# Patient Record
Sex: FEMALE | Race: WHITE | HISPANIC OR LATINO | URBAN - METROPOLITAN AREA
[De-identification: names, ages, dates, MRNs, and addresses within clinical notes are randomized per-mention and may not be internally consistent; named-entity substitution may affect disease eponyms.]

---

## 2017-07-03 ENCOUNTER — EMERGENCY (EMERGENCY)
Facility: HOSPITAL | Age: 67
LOS: 0 days | Discharge: HOME | End: 2017-07-03
Attending: EMERGENCY MEDICINE | Admitting: EMERGENCY MEDICINE
Payer: MEDICARE

## 2017-07-03 VITALS
RESPIRATION RATE: 17 BRPM | DIASTOLIC BLOOD PRESSURE: 84 MMHG | OXYGEN SATURATION: 96 % | HEART RATE: 96 BPM | TEMPERATURE: 99 F | SYSTOLIC BLOOD PRESSURE: 181 MMHG

## 2017-07-03 VITALS — WEIGHT: 166.89 LBS | HEIGHT: 64 IN

## 2017-07-03 DIAGNOSIS — C50.919 MALIGNANT NEOPLASM OF UNSPECIFIED SITE OF UNSPECIFIED FEMALE BREAST: ICD-10-CM

## 2017-07-03 DIAGNOSIS — D64.81 ANEMIA DUE TO ANTINEOPLASTIC CHEMOTHERAPY: ICD-10-CM

## 2017-07-03 DIAGNOSIS — T45.1X5A ADVERSE EFFECT OF ANTINEOPLASTIC AND IMMUNOSUPPRESSIVE DRUGS, INITIAL ENCOUNTER: ICD-10-CM

## 2017-07-03 LAB
ABO RH CONFIRMATION: SIGNIFICANT CHANGE UP
ALBUMIN SERPL ELPH-MCNC: 3 G/DL — LOW (ref 3.3–5)
ALP SERPL-CCNC: 92 U/L — SIGNIFICANT CHANGE UP (ref 40–120)
ALT FLD-CCNC: 34 U/L — SIGNIFICANT CHANGE UP (ref 12–78)
ANION GAP SERPL CALC-SCNC: 9 MMOL/L — SIGNIFICANT CHANGE UP (ref 5–17)
ANISOCYTOSIS BLD QL: SIGNIFICANT CHANGE UP
APTT BLD: 35.4 SEC — SIGNIFICANT CHANGE UP (ref 27.5–37.4)
AST SERPL-CCNC: 45 U/L — HIGH (ref 15–37)
BASOPHILS # BLD AUTO: 0 K/UL — SIGNIFICANT CHANGE UP (ref 0–0.2)
BILIRUB SERPL-MCNC: 0.8 MG/DL — SIGNIFICANT CHANGE UP (ref 0.2–1.2)
BLD GP AB SCN SERPL QL: SIGNIFICANT CHANGE UP
BUN SERPL-MCNC: 20 MG/DL — SIGNIFICANT CHANGE UP (ref 7–23)
CALCIUM SERPL-MCNC: 8.2 MG/DL — LOW (ref 8.5–10.1)
CHLORIDE SERPL-SCNC: 106 MMOL/L — SIGNIFICANT CHANGE UP (ref 96–108)
CO2 SERPL-SCNC: 23 MMOL/L — SIGNIFICANT CHANGE UP (ref 22–31)
CREAT SERPL-MCNC: 1.46 MG/DL — HIGH (ref 0.5–1.3)
DACRYOCYTES BLD QL SMEAR: SLIGHT — SIGNIFICANT CHANGE UP
EOSINOPHIL # BLD AUTO: 0.1 K/UL — SIGNIFICANT CHANGE UP (ref 0–0.5)
EOSINOPHIL NFR BLD AUTO: 2 % — SIGNIFICANT CHANGE UP (ref 0–6)
GLUCOSE SERPL-MCNC: 102 MG/DL — HIGH (ref 70–99)
HCT VFR BLD CALC: 22.1 % — LOW (ref 34.5–45)
HGB BLD-MCNC: 7.5 G/DL — LOW (ref 11.5–15.5)
HYPOCHROMIA BLD QL: SLIGHT — SIGNIFICANT CHANGE UP
INR BLD: 1.23 RATIO — HIGH (ref 0.88–1.16)
LYMPHOCYTES # BLD AUTO: 1 K/UL — SIGNIFICANT CHANGE UP (ref 1–3.3)
LYMPHOCYTES # BLD AUTO: 20 % — SIGNIFICANT CHANGE UP (ref 13–44)
MAGNESIUM SERPL-MCNC: 2 MG/DL — SIGNIFICANT CHANGE UP (ref 1.6–2.6)
MANUAL SMEAR VERIFICATION: YES — SIGNIFICANT CHANGE UP
MCHC RBC-ENTMCNC: 30 PG — SIGNIFICANT CHANGE UP (ref 27–34)
MCHC RBC-ENTMCNC: 33.8 GM/DL — SIGNIFICANT CHANGE UP (ref 32–36)
MCV RBC AUTO: 88.9 FL — SIGNIFICANT CHANGE UP (ref 80–100)
METAMYELOCYTES # FLD: 2 % — HIGH (ref 0–0)
MONOCYTES # BLD AUTO: 0.9 K/UL — SIGNIFICANT CHANGE UP (ref 0–0.9)
MONOCYTES NFR BLD AUTO: 19 % — HIGH (ref 2–14)
NEUTROPHILS # BLD AUTO: 3.2 K/UL — SIGNIFICANT CHANGE UP (ref 1.8–7.4)
NEUTROPHILS NFR BLD AUTO: 52 % — SIGNIFICANT CHANGE UP (ref 43–77)
NEUTS BAND # BLD: 3 % — SIGNIFICANT CHANGE UP (ref 0–8)
NT-PROBNP SERPL-SCNC: 7655 PG/ML — HIGH (ref 0–125)
OVALOCYTES BLD QL SMEAR: SLIGHT — SIGNIFICANT CHANGE UP
PLAT MORPH BLD: NORMAL — SIGNIFICANT CHANGE UP
PLATELET # BLD AUTO: 125 K/UL — LOW (ref 150–400)
POIKILOCYTOSIS BLD QL AUTO: SIGNIFICANT CHANGE UP
POLYCHROMASIA BLD QL SMEAR: SIGNIFICANT CHANGE UP
POTASSIUM SERPL-MCNC: 4.3 MMOL/L — SIGNIFICANT CHANGE UP (ref 3.5–5.3)
POTASSIUM SERPL-SCNC: 4.3 MMOL/L — SIGNIFICANT CHANGE UP (ref 3.5–5.3)
PROT SERPL-MCNC: 6 GM/DL — SIGNIFICANT CHANGE UP (ref 6–8.3)
PROTHROM AB SERPL-ACNC: 13.3 SEC — HIGH (ref 9.8–12.7)
RBC # BLD: 2.49 M/UL — LOW (ref 3.8–5.2)
RBC # FLD: 23.1 % — HIGH (ref 10.3–14.5)
RBC BLD AUTO: (no result)
SODIUM SERPL-SCNC: 138 MMOL/L — SIGNIFICANT CHANGE UP (ref 135–145)
TROPONIN I SERPL-MCNC: <0.015 NG/ML — SIGNIFICANT CHANGE UP (ref 0.01–0.04)
TSH SERPL-MCNC: <0.005 UIU/ML — LOW (ref 0.36–3.74)
TYPE + AB SCN PNL BLD: SIGNIFICANT CHANGE UP
VARIANT LYMPHS # BLD: 2 % — SIGNIFICANT CHANGE UP (ref 0–6)
WBC # BLD: 5.2 K/UL — SIGNIFICANT CHANGE UP (ref 3.8–10.5)
WBC # FLD AUTO: 5.2 K/UL — SIGNIFICANT CHANGE UP (ref 3.8–10.5)

## 2017-07-03 PROCEDURE — 93010 ELECTROCARDIOGRAM REPORT: CPT

## 2017-07-03 PROCEDURE — 71020: CPT | Mod: 26

## 2017-07-03 PROCEDURE — 99285 EMERGENCY DEPT VISIT HI MDM: CPT

## 2017-07-03 RX ORDER — SODIUM CHLORIDE 9 MG/ML
3 INJECTION INTRAMUSCULAR; INTRAVENOUS; SUBCUTANEOUS ONCE
Qty: 0 | Refills: 0 | Status: COMPLETED | OUTPATIENT
Start: 2017-07-03 | End: 2017-07-03

## 2017-07-03 RX ORDER — SODIUM CHLORIDE 9 MG/ML
1000 INJECTION INTRAMUSCULAR; INTRAVENOUS; SUBCUTANEOUS ONCE
Qty: 0 | Refills: 0 | Status: DISCONTINUED | OUTPATIENT
Start: 2017-07-03 | End: 2017-07-03

## 2017-07-03 RX ADMIN — SODIUM CHLORIDE 3 MILLILITER(S): 9 INJECTION INTRAMUSCULAR; INTRAVENOUS; SUBCUTANEOUS at 15:12

## 2017-07-03 NOTE — ED PROVIDER NOTE - OBJECTIVE STATEMENT
67 yo female currently undergoing chemo for breast cancer at [facility], sent in by her oncologist Dr. Parker for low blood count on labs drawn last Friday: Hgb=7.4, Hct=21.7, WBC=1.9. Pt reports feeling weak and dizzy. Has had two transfusions in the past.

## 2017-07-03 NOTE — ED STATDOCS - PROGRESS NOTE DETAILS
Shay Mauricio on behalf of Attending Dr. Hollins. 67 y/o female with PMHx of breast CA presents to the ED c/o weakness. Pt went to chemo on Friday and found that her H&H were low. Pt has been feeling weak, nauseous and 1 episode of vomiting and 1 episode of constipation 2 days ago. pt states that stool had been dark. Pt was sent to the ED to receive transfusions. Pt will be sent to the Main ED for further evaluation.

## 2017-07-03 NOTE — ED PROVIDER NOTE - PROGRESS NOTE DETAILS
patient feels better after transfusion; currently no sob; labs including BNP noted but patient feels ok  will follow up with oncologist

## 2017-07-03 NOTE — ED PROVIDER NOTE - MUSCULOSKELETAL, MLM
Spine appears normal, range of motion is not limited, no muscle or joint tenderness. Port in left upper chest wall. No calf swelling or tenderness.

## 2017-08-05 ENCOUNTER — INPATIENT (INPATIENT)
Facility: HOSPITAL | Age: 67
LOS: 4 days | Discharge: HOME | End: 2017-08-10
Attending: FAMILY MEDICINE | Admitting: INTERNAL MEDICINE
Payer: MEDICARE

## 2017-08-05 VITALS — WEIGHT: 164.91 LBS | HEIGHT: 64 IN

## 2017-08-05 LAB
ADD ON TEST-SPECIMEN IN LAB: SIGNIFICANT CHANGE UP
ALBUMIN SERPL ELPH-MCNC: 3 G/DL — LOW (ref 3.3–5)
ALP SERPL-CCNC: 97 U/L — SIGNIFICANT CHANGE UP (ref 40–120)
ALT FLD-CCNC: 25 U/L — SIGNIFICANT CHANGE UP (ref 12–78)
ANION GAP SERPL CALC-SCNC: 8 MMOL/L — SIGNIFICANT CHANGE UP (ref 5–17)
APPEARANCE UR: CLEAR — SIGNIFICANT CHANGE UP
AST SERPL-CCNC: 29 U/L — SIGNIFICANT CHANGE UP (ref 15–37)
BACTERIA # UR AUTO: (no result)
BASOPHILS # BLD AUTO: 0.1 K/UL — SIGNIFICANT CHANGE UP (ref 0–0.2)
BASOPHILS NFR BLD AUTO: 1.2 % — SIGNIFICANT CHANGE UP (ref 0–2)
BILIRUB SERPL-MCNC: 0.8 MG/DL — SIGNIFICANT CHANGE UP (ref 0.2–1.2)
BILIRUB UR-MCNC: NEGATIVE — SIGNIFICANT CHANGE UP
BUN SERPL-MCNC: 32 MG/DL — HIGH (ref 7–23)
CALCIUM SERPL-MCNC: 8.8 MG/DL — SIGNIFICANT CHANGE UP (ref 8.5–10.1)
CHLORIDE SERPL-SCNC: 106 MMOL/L — SIGNIFICANT CHANGE UP (ref 96–108)
CO2 SERPL-SCNC: 23 MMOL/L — SIGNIFICANT CHANGE UP (ref 22–31)
COLOR SPEC: YELLOW — SIGNIFICANT CHANGE UP
CREAT SERPL-MCNC: 1.97 MG/DL — HIGH (ref 0.5–1.3)
D DIMER BLD IA.RAPID-MCNC: 581 NG/ML DDU — HIGH
DIFF PNL FLD: (no result)
EOSINOPHIL # BLD AUTO: 0.3 K/UL — SIGNIFICANT CHANGE UP (ref 0–0.5)
EOSINOPHIL NFR BLD AUTO: 3.6 % — SIGNIFICANT CHANGE UP (ref 0–6)
EPI CELLS # UR: SIGNIFICANT CHANGE UP
GLUCOSE SERPL-MCNC: 162 MG/DL — HIGH (ref 70–99)
GLUCOSE UR QL: NEGATIVE MG/DL — SIGNIFICANT CHANGE UP
HCT VFR BLD CALC: 25.2 % — LOW (ref 34.5–45)
HGB BLD-MCNC: 8.6 G/DL — LOW (ref 11.5–15.5)
INR BLD: 1.19 RATIO — HIGH (ref 0.88–1.16)
KETONES UR-MCNC: NEGATIVE — SIGNIFICANT CHANGE UP
LACTATE SERPL-SCNC: 1.5 MMOL/L — SIGNIFICANT CHANGE UP (ref 0.7–2)
LEUKOCYTE ESTERASE UR-ACNC: NEGATIVE — SIGNIFICANT CHANGE UP
LYMPHOCYTES # BLD AUTO: 1.2 K/UL — SIGNIFICANT CHANGE UP (ref 1–3.3)
LYMPHOCYTES # BLD AUTO: 16.8 % — SIGNIFICANT CHANGE UP (ref 13–44)
MCHC RBC-ENTMCNC: 30.5 PG — SIGNIFICANT CHANGE UP (ref 27–34)
MCHC RBC-ENTMCNC: 34.1 GM/DL — SIGNIFICANT CHANGE UP (ref 32–36)
MCV RBC AUTO: 89.2 FL — SIGNIFICANT CHANGE UP (ref 80–100)
MONOCYTES # BLD AUTO: 0.5 K/UL — SIGNIFICANT CHANGE UP (ref 0–0.9)
MONOCYTES NFR BLD AUTO: 7.2 % — SIGNIFICANT CHANGE UP (ref 2–14)
NEUTROPHILS # BLD AUTO: 5.2 K/UL — SIGNIFICANT CHANGE UP (ref 1.8–7.4)
NEUTROPHILS NFR BLD AUTO: 71.2 % — SIGNIFICANT CHANGE UP (ref 43–77)
NITRITE UR-MCNC: NEGATIVE — SIGNIFICANT CHANGE UP
NT-PROBNP SERPL-SCNC: HIGH PG/ML (ref 0–125)
PH UR: 7 — SIGNIFICANT CHANGE UP (ref 5–8)
PLATELET # BLD AUTO: 194 K/UL — SIGNIFICANT CHANGE UP (ref 150–400)
POTASSIUM SERPL-MCNC: 4.5 MMOL/L — SIGNIFICANT CHANGE UP (ref 3.5–5.3)
POTASSIUM SERPL-SCNC: 4.5 MMOL/L — SIGNIFICANT CHANGE UP (ref 3.5–5.3)
PROT SERPL-MCNC: 6.1 GM/DL — SIGNIFICANT CHANGE UP (ref 6–8.3)
PROT UR-MCNC: 30 MG/DL
PROTHROM AB SERPL-ACNC: 12.9 SEC — HIGH (ref 9.8–12.7)
RBC # BLD: 2.82 M/UL — LOW (ref 3.8–5.2)
RBC # FLD: 14.3 % — SIGNIFICANT CHANGE UP (ref 10.3–14.5)
RBC CASTS # UR COMP ASSIST: (no result) /HPF (ref 0–4)
SODIUM SERPL-SCNC: 137 MMOL/L — SIGNIFICANT CHANGE UP (ref 135–145)
SP GR SPEC: 1 — LOW (ref 1.01–1.02)
TROPONIN I SERPL-MCNC: <0.015 NG/ML — SIGNIFICANT CHANGE UP (ref 0.01–0.04)
UROBILINOGEN FLD QL: NEGATIVE MG/DL — SIGNIFICANT CHANGE UP
WBC # BLD: 7.3 K/UL — SIGNIFICANT CHANGE UP (ref 3.8–10.5)
WBC # FLD AUTO: 7.3 K/UL — SIGNIFICANT CHANGE UP (ref 3.8–10.5)
WBC UR QL: SIGNIFICANT CHANGE UP

## 2017-08-05 PROCEDURE — 71020: CPT | Mod: 26

## 2017-08-05 PROCEDURE — 99285 EMERGENCY DEPT VISIT HI MDM: CPT

## 2017-08-05 PROCEDURE — 93970 EXTREMITY STUDY: CPT | Mod: 26

## 2017-08-05 PROCEDURE — 93010 ELECTROCARDIOGRAM REPORT: CPT

## 2017-08-05 RX ORDER — GLUCAGON INJECTION, SOLUTION 0.5 MG/.1ML
1 INJECTION, SOLUTION SUBCUTANEOUS ONCE
Qty: 0 | Refills: 0 | Status: DISCONTINUED | OUTPATIENT
Start: 2017-08-05 | End: 2017-08-10

## 2017-08-05 RX ORDER — INSULIN LISPRO 100/ML
VIAL (ML) SUBCUTANEOUS
Qty: 0 | Refills: 0 | Status: DISCONTINUED | OUTPATIENT
Start: 2017-08-05 | End: 2017-08-10

## 2017-08-05 RX ORDER — FUROSEMIDE 40 MG
0 TABLET ORAL
Qty: 0 | Refills: 0 | COMMUNITY

## 2017-08-05 RX ORDER — SODIUM CHLORIDE 9 MG/ML
1000 INJECTION, SOLUTION INTRAVENOUS
Qty: 0 | Refills: 0 | Status: DISCONTINUED | OUTPATIENT
Start: 2017-08-05 | End: 2017-08-10

## 2017-08-05 RX ORDER — SODIUM CHLORIDE 9 MG/ML
3 INJECTION INTRAMUSCULAR; INTRAVENOUS; SUBCUTANEOUS EVERY 8 HOURS
Qty: 0 | Refills: 0 | Status: DISCONTINUED | OUTPATIENT
Start: 2017-08-05 | End: 2017-08-10

## 2017-08-05 RX ORDER — ENOXAPARIN SODIUM 100 MG/ML
75 INJECTION SUBCUTANEOUS EVERY 12 HOURS
Qty: 0 | Refills: 0 | Status: DISCONTINUED | OUTPATIENT
Start: 2017-08-05 | End: 2017-08-09

## 2017-08-05 RX ORDER — DEXTROSE 50 % IN WATER 50 %
1 SYRINGE (ML) INTRAVENOUS ONCE
Qty: 0 | Refills: 0 | Status: DISCONTINUED | OUTPATIENT
Start: 2017-08-05 | End: 2017-08-10

## 2017-08-05 RX ORDER — SIMVASTATIN 20 MG/1
10 TABLET, FILM COATED ORAL AT BEDTIME
Qty: 0 | Refills: 0 | Status: DISCONTINUED | OUTPATIENT
Start: 2017-08-05 | End: 2017-08-10

## 2017-08-05 RX ORDER — FUROSEMIDE 40 MG
40 TABLET ORAL DAILY
Qty: 0 | Refills: 0 | Status: DISCONTINUED | OUTPATIENT
Start: 2017-08-05 | End: 2017-08-10

## 2017-08-05 RX ORDER — PANTOPRAZOLE SODIUM 20 MG/1
40 TABLET, DELAYED RELEASE ORAL
Qty: 0 | Refills: 0 | Status: DISCONTINUED | OUTPATIENT
Start: 2017-08-05 | End: 2017-08-10

## 2017-08-05 RX ORDER — AMLODIPINE BESYLATE 2.5 MG/1
5 TABLET ORAL AT BEDTIME
Qty: 0 | Refills: 0 | Status: DISCONTINUED | OUTPATIENT
Start: 2017-08-05 | End: 2017-08-10

## 2017-08-05 RX ORDER — DEXTROSE 50 % IN WATER 50 %
12.5 SYRINGE (ML) INTRAVENOUS ONCE
Qty: 0 | Refills: 0 | Status: DISCONTINUED | OUTPATIENT
Start: 2017-08-05 | End: 2017-08-10

## 2017-08-05 RX ORDER — DEXTROSE 50 % IN WATER 50 %
25 SYRINGE (ML) INTRAVENOUS ONCE
Qty: 0 | Refills: 0 | Status: DISCONTINUED | OUTPATIENT
Start: 2017-08-05 | End: 2017-08-10

## 2017-08-05 RX ORDER — LISINOPRIL 2.5 MG/1
1 TABLET ORAL
Qty: 0 | Refills: 0 | COMMUNITY

## 2017-08-05 RX ORDER — ENOXAPARIN SODIUM 100 MG/ML
75 INJECTION SUBCUTANEOUS ONCE
Qty: 0 | Refills: 0 | Status: COMPLETED | OUTPATIENT
Start: 2017-08-05 | End: 2017-08-05

## 2017-08-05 RX ORDER — SODIUM CHLORIDE 9 MG/ML
3 INJECTION INTRAMUSCULAR; INTRAVENOUS; SUBCUTANEOUS ONCE
Qty: 0 | Refills: 0 | Status: COMPLETED | OUTPATIENT
Start: 2017-08-05 | End: 2017-08-05

## 2017-08-05 RX ORDER — SOD,AMMONIUM,POTASSIUM LACTATE
1 CREAM (GRAM) TOPICAL
Qty: 0 | Refills: 0 | Status: DISCONTINUED | OUTPATIENT
Start: 2017-08-05 | End: 2017-08-10

## 2017-08-05 RX ORDER — HYDRALAZINE HCL 50 MG
10 TABLET ORAL EVERY 8 HOURS
Qty: 0 | Refills: 0 | Status: DISCONTINUED | OUTPATIENT
Start: 2017-08-05 | End: 2017-08-10

## 2017-08-05 RX ORDER — FAMOTIDINE 10 MG/ML
20 INJECTION INTRAVENOUS ONCE
Qty: 0 | Refills: 0 | Status: COMPLETED | OUTPATIENT
Start: 2017-08-05 | End: 2017-08-05

## 2017-08-05 RX ADMIN — Medication 30 MILLILITER(S): at 15:11

## 2017-08-05 RX ADMIN — ENOXAPARIN SODIUM 75 MILLIGRAM(S): 100 INJECTION SUBCUTANEOUS at 16:31

## 2017-08-05 RX ADMIN — SIMVASTATIN 10 MILLIGRAM(S): 20 TABLET, FILM COATED ORAL at 22:27

## 2017-08-05 RX ADMIN — AMLODIPINE BESYLATE 5 MILLIGRAM(S): 2.5 TABLET ORAL at 22:24

## 2017-08-05 RX ADMIN — FAMOTIDINE 20 MILLIGRAM(S): 10 INJECTION INTRAVENOUS at 15:11

## 2017-08-05 RX ADMIN — SODIUM CHLORIDE 3 MILLILITER(S): 9 INJECTION INTRAMUSCULAR; INTRAVENOUS; SUBCUTANEOUS at 22:27

## 2017-08-05 RX ADMIN — SODIUM CHLORIDE 3 MILLILITER(S): 9 INJECTION INTRAMUSCULAR; INTRAVENOUS; SUBCUTANEOUS at 12:32

## 2017-08-05 NOTE — H&P ADULT - ASSESSMENT
1) SOB over last 24 hrs  A) orthopnea and ARANA  B) prior hx CHF(unspecified if sys+/or diastolic but off lisinopril and lasix for several days w elevated BNP 10,000.   poss CM due to remote adriamycin and HTN  C) elevated ddimer in pt w malignacy concerning for poss PE  1. admit telemetry  2. monitor pulse ox  3. Oxygen to keep sat>95%  4. card to see  5. echo  6. checking TSH off thyroid meds  4. IV lasix tonight  5. repeat BMP, Mg  6 continue statin and norvasc  7. no ACE- I given renal dysfunction  8. holding off on VQ scan at present    2) CKD stage 3-4  A) recently increased to 2.5 on 07-31 but numbers improving although CHF worsened  1. weights  2. I/O  3. manage BP  4. monitor Cr as lovenox dosing may require adjustment if number increas    3) HTN  A) daughter showed weekely charts of BP w usual meds w good control  B) elevated while off ACE-I and lasix to sys 170's, d hi 90s  1. continue norvasc  2. adding hydralazine pending cardiology recommendations above    4) HLD  1. check lipids  2. continue statin    5) DM II  A) no oral agents in house  1. Hb A1c  2. ISS    6) epigastric upset  A) better w pepcid AC partially  1. protonix    7) Anemia is table at present  A) follow up w relief of acid reflux  1. remind pt to monitor stool    8) breast CA

## 2017-08-05 NOTE — ED ADULT NURSE NOTE - CHPI ED SYMPTOMS NEG
no chills/no hemoptysis/no edema/no wheezing/no headache/no body aches/no diaphoresis/no cough/no chest pain/no fever

## 2017-08-05 NOTE — H&P ADULT - NSHPLABSRESULTS_GEN_ALL_CORE
Dopplers both LE neg for DVT    EKG SR 90s w NS ST-T changes    CXR: small bilateral pleural effusions w bibasilar atelectasis  mild CM + L mediport

## 2017-08-05 NOTE — H&P ADULT - NSHPOUTPATIENTPROVIDERS_GEN_ALL_CORE
Onc  Dr. Amauri Parker [phone] left message at 9:11 PM ([facility]) and   [facility] in [facility] for pcp

## 2017-08-05 NOTE — ED STATDOCS - PROGRESS NOTE DETAILS
Luly Amador: 65 y/o F w/ pmhx lung CA presents to ED c/o SOB since yesterday night. Pt states it is worse with lying down. Daughter states she had similar symptoms last March. Had CT scan that showed bilat effusions yesterday.  Will be sent to [facility] for further eval. Luly Amador: 67 y/o F w/ pmhx lung CA on gemcitabine presents to ED c/o SOB since yesterday night. Pt states it is worse with lying down. Daughter states she had similar symptoms last March. Had CT scan that showed bilat effusions yesterday.  Will be sent to [facility] for further eval.

## 2017-08-05 NOTE — ED PROVIDER NOTE - OBJECTIVE STATEMENT
65 y/o F with PMHx of breast CA and pleural effusion presents to the ED c/o SOB starting this morning. Pt states that SOB is similar to when Pt had pleural effusion. Pt was on chemo for breast CA but stopped 4 weeks ago after Pt got weak and had decreased PO intake. Dr. Parker, onco in [place]. Pt attends a clinic in [place] for PMD. 67 y/o F with PMHx of breast CA and pleural effusion presents to the ED c/o SOB starting this morning. SOB worsens when she is laying down. Pt states that when she lays down, she feels like she is drowning. Pt states that SOB is similar to when Pt had pleural effusion. Pt was on chemo for breast CA but stopped 4 weeks ago after Pt got weak and had decreased PO intake. Dr. Parker, onco in [place]. Pt attends a clinic in [place] for PMD.

## 2017-08-05 NOTE — H&P ADULT - HISTORY OF PRESENT ILLNESS
66 yr old female w breast CA s/p lumpectomies, LN dissections w last chemo 4 weeks ago, C2 fusion for pathologic fxCHF,  HTN, HLD, DM II presented to  ED w weakness, fatigue and SOB       Had worsening fatigue1 month ago; she was transfused in  ED when her Hb was 7.       On 07-31, was told of an elevated Cr 2.5 went she had bloodwork before a CT scan ordered by her oncologist.  lasix and lisinopril were held.          Yesterday SOB  made her feel as if she "were drowning" when she laid down.  SOB increased when walking.  She had these symptoms before when she had  a large R pleural effusion for which  thoracentesis was done (benign)       SOB and weakness progressed over the past day.  Mild nausea and some epigastric discomfort that was partially relieved w pepcid AC. "Tastes like acid"       CXR showed very small pleural effusions bilaterally w baseline atelectasis.  D-dimers were elevated Dopplers were neg for DVT.  Due to elevated Cr she could not have a CTA so lovenox was administered and she is being admitted to hospital    PMHx:  1) Breast CA s/p bilat lumpectomies w LN dissections w residual lymphedema L  had adriamycin and tamoxifen in the past  Unable to tell me name of last chemo  2) Pathologic fx necessitating C2 fusion 10 yrs ago and lesion at L iliac crest  3) Elevated Cr 2.5 on 07-  4) Hx R pleural effusion (benign)  5) CHF controlled w ACE-I and lasix  unknown systolic and diastolic  6) HTN  7) HLD  8) DMII on oral agent  9) hx of ICU stay after hysterectomy 5 yrs ago when intubated w C2 fusion  10) thyroid nodules (multiple bx neg for malignancy), hx hyperthyroid no longer on methimazole  11) anemia due to chemo    PSHx:  1) Hysterectomy 5 yrs ago  2) bilateral lumpectomies w LN dissections 7 years ago on R and 19 on L  3) cholecystectomy  4) thoracentesis   5) mediport placement L anterior chest    FHX no hx of CA

## 2017-08-05 NOTE — ED PROVIDER NOTE - PROGRESS NOTE DETAILS
d dimer elevated, hr elevated pt sob will empirically cover with lovenox for the possibility of pe with ho breast ca, unable to do cta due to gfr of 22, will give lasix 20 mg iv for elevated bnp, tba

## 2017-08-05 NOTE — H&P ADULT - NSHPPHYSICALEXAM_GEN_ALL_CORE
Vital Signs Last 24 Hrs  T(C): 37.2 (05 Aug 2017 20:30), Max: 37.2 (05 Aug 2017 20:30)  T(F): 98.9 (05 Aug 2017 20:30), Max: 98.9 (05 Aug 2017 20:30)  HR: 113 (05 Aug 2017 20:30) (101 - 113)  BP: 185/99 (05 Aug 2017 20:30) (160/92 - 185/99)  BP(mean): --  RR: 16 (05 Aug 2017 20:30) (16 - 25)  SpO2: 97% (05 Aug 2017 20:30) (97% - 97%)    pleasant  female appearing fatigued  General: well developed well groomed initially w/o oxygen  HEENT anicteric sclera pupils reactive, no asymmetry  Neck no JVD, no carotid bruits  L internal neck catheter felt  Chest L ant chest w mediport  breast exams deferred as not indicated for current complaints  Chest clear BS bilaterally w good excursions  Cor RR S1 + S2  Abd + BS soft, nontender no HSM  old RUQ surgical scar  Extrem  LUE lymphedema >> RUE  BLE no edema  MSk nontender to palpation  Vasc symmetric in both LE 2+  Skin w/o lesions  Neuro alert O x 3, nl speech, symmetric strength  /VAG/Rectal and LN deferred

## 2017-08-05 NOTE — H&P ADULT - ATTENDING COMMENTS
daughter will be transitioning patient's care to our community as mother has been unable to consistently follow up in [facility] when she is ill      sw and discharge planning

## 2017-08-05 NOTE — ED ADULT NURSE NOTE - OBJECTIVE STATEMENT
pt brought by family for eval of diff breathing [place] w/ laying flat. dx w/ breast CA 20 years ago now w/ mets to bones on on chemo through port in right chest, no chemo last month.

## 2017-08-06 DIAGNOSIS — J90 PLEURAL EFFUSION, NOT ELSEWHERE CLASSIFIED: ICD-10-CM

## 2017-08-06 DIAGNOSIS — I10 ESSENTIAL (PRIMARY) HYPERTENSION: ICD-10-CM

## 2017-08-06 DIAGNOSIS — Z01.810 ENCOUNTER FOR PREPROCEDURAL CARDIOVASCULAR EXAMINATION: ICD-10-CM

## 2017-08-06 DIAGNOSIS — I50.21 ACUTE SYSTOLIC (CONGESTIVE) HEART FAILURE: ICD-10-CM

## 2017-08-06 DIAGNOSIS — R06.02 SHORTNESS OF BREATH: ICD-10-CM

## 2017-08-06 LAB
ANION GAP SERPL CALC-SCNC: 10 MMOL/L — SIGNIFICANT CHANGE UP (ref 5–17)
BUN SERPL-MCNC: 29 MG/DL — HIGH (ref 7–23)
CALCIUM SERPL-MCNC: 8.4 MG/DL — LOW (ref 8.5–10.1)
CHLORIDE SERPL-SCNC: 107 MMOL/L — SIGNIFICANT CHANGE UP (ref 96–108)
CHOLEST SERPL-MCNC: 105 MG/DL — SIGNIFICANT CHANGE UP (ref 10–199)
CO2 SERPL-SCNC: 23 MMOL/L — SIGNIFICANT CHANGE UP (ref 22–31)
CREAT SERPL-MCNC: 1.76 MG/DL — HIGH (ref 0.5–1.3)
GLUCOSE SERPL-MCNC: 98 MG/DL — SIGNIFICANT CHANGE UP (ref 70–99)
HBA1C BLD-MCNC: 4.5 % — SIGNIFICANT CHANGE UP (ref 4–5.6)
HCT VFR BLD CALC: 25.7 % — LOW (ref 34.5–45)
HDLC SERPL-MCNC: 44 MG/DL — SIGNIFICANT CHANGE UP (ref 40–125)
HGB BLD-MCNC: 8.4 G/DL — LOW (ref 11.5–15.5)
LIPID PNL WITH DIRECT LDL SERPL: 44 MG/DL — SIGNIFICANT CHANGE UP
MAGNESIUM SERPL-MCNC: 2.1 MG/DL — SIGNIFICANT CHANGE UP (ref 1.6–2.6)
MCHC RBC-ENTMCNC: 29.6 PG — SIGNIFICANT CHANGE UP (ref 27–34)
MCHC RBC-ENTMCNC: 32.7 GM/DL — SIGNIFICANT CHANGE UP (ref 32–36)
MCV RBC AUTO: 90.4 FL — SIGNIFICANT CHANGE UP (ref 80–100)
NT-PROBNP SERPL-SCNC: HIGH PG/ML (ref 0–125)
PLATELET # BLD AUTO: 181 K/UL — SIGNIFICANT CHANGE UP (ref 150–400)
POTASSIUM SERPL-MCNC: 4 MMOL/L — SIGNIFICANT CHANGE UP (ref 3.5–5.3)
POTASSIUM SERPL-SCNC: 4 MMOL/L — SIGNIFICANT CHANGE UP (ref 3.5–5.3)
RBC # BLD: 2.85 M/UL — LOW (ref 3.8–5.2)
RBC # FLD: 14.2 % — SIGNIFICANT CHANGE UP (ref 10.3–14.5)
SODIUM SERPL-SCNC: 140 MMOL/L — SIGNIFICANT CHANGE UP (ref 135–145)
TOTAL CHOLESTEROL/HDL RATIO MEASUREMENT: 2.4 RATIO — LOW (ref 3.3–7.1)
TRIGL SERPL-MCNC: 83 MG/DL — SIGNIFICANT CHANGE UP (ref 10–149)
TSH SERPL-MCNC: <0.005 UU/ML — LOW (ref 0.36–3.74)
WBC # BLD: 6.7 K/UL — SIGNIFICANT CHANGE UP (ref 3.8–10.5)
WBC # FLD AUTO: 6.7 K/UL — SIGNIFICANT CHANGE UP (ref 3.8–10.5)

## 2017-08-06 RX ADMIN — SODIUM CHLORIDE 3 MILLILITER(S): 9 INJECTION INTRAMUSCULAR; INTRAVENOUS; SUBCUTANEOUS at 05:56

## 2017-08-06 RX ADMIN — SIMVASTATIN 10 MILLIGRAM(S): 20 TABLET, FILM COATED ORAL at 21:18

## 2017-08-06 RX ADMIN — ENOXAPARIN SODIUM 75 MILLIGRAM(S): 100 INJECTION SUBCUTANEOUS at 05:56

## 2017-08-06 RX ADMIN — SODIUM CHLORIDE 3 MILLILITER(S): 9 INJECTION INTRAMUSCULAR; INTRAVENOUS; SUBCUTANEOUS at 12:10

## 2017-08-06 RX ADMIN — PANTOPRAZOLE SODIUM 40 MILLIGRAM(S): 20 TABLET, DELAYED RELEASE ORAL at 18:28

## 2017-08-06 RX ADMIN — Medication 1 APPLICATION(S): at 05:56

## 2017-08-06 RX ADMIN — Medication 1 APPLICATION(S): at 18:27

## 2017-08-06 RX ADMIN — SODIUM CHLORIDE 3 MILLILITER(S): 9 INJECTION INTRAMUSCULAR; INTRAVENOUS; SUBCUTANEOUS at 21:17

## 2017-08-06 RX ADMIN — Medication 40 MILLIGRAM(S): at 05:56

## 2017-08-06 RX ADMIN — ENOXAPARIN SODIUM 75 MILLIGRAM(S): 100 INJECTION SUBCUTANEOUS at 18:28

## 2017-08-06 RX ADMIN — PANTOPRAZOLE SODIUM 40 MILLIGRAM(S): 20 TABLET, DELAYED RELEASE ORAL at 05:56

## 2017-08-06 RX ADMIN — Medication 1 TABLET(S): at 18:28

## 2017-08-06 RX ADMIN — Medication 1 TABLET(S): at 05:56

## 2017-08-06 RX ADMIN — AMLODIPINE BESYLATE 5 MILLIGRAM(S): 2.5 TABLET ORAL at 21:18

## 2017-08-06 NOTE — CONSULT NOTE ADULT - SUBJECTIVE AND OBJECTIVE BOX
CARDIOLOGY AND ELECTROPHYSIOLOGY ASSESSMENT    HPI:  66 yr old female w breast CA s/p lumpectomies, LN dissections w last chemo 4 weeks ago, C2 fusion for pathologic fxCHF,  HTN, HLD, DM II presented to  ED w weakness, fatigue and SOB       Had worsening fatigue1 month ago; she was transfused in  ED when her Hb was 7.       On , was told of an elevated Cr 2.5 went she had bloodwork before a CT scan ordered by her oncologist.  lasix and lisinopril were held.          Yesterday SOB  made her feel as if she "were drowning" when she laid down.  SOB increased when walking.  She had these symptoms before when she had  a large R pleural effusion for which  thoracentesis was done (benign)       SOB and weakness progressed over the past day.  Mild nausea and some epigastric discomfort that was partially relieved w pepcid AC. "Tastes like acid"       CXR showed very small pleural effusions bilaterally w baseline atelectasis.  D-dimers were elevated Dopplers were neg for DVT.  Due to elevated Cr she could not have a CTA so lovenox was administered and she is being admitted to hospital    17:  Pt. seen and examined.  Feeling better but states that she has had significant SOB with significant orthopnea.    PMHx:  1) Breast CA s/p bilat lumpectomies w LN dissections w residual lymphedema L  had adriamycin and tamoxifen in the past  Unable to tell me name of last chemo  2) Pathologic fx necessitating C2 fusion 10 yrs ago and lesion at L iliac crest  3) Elevated Cr 2.5 on 2017  4) Hx R pleural effusion (benign)  5) CHF controlled w ACE-I and lasix  unknown systolic and diastolic  6) HTN  7) HLD  8) DMII on oral agent  9) hx of ICU stay after hysterectomy 5 yrs ago when intubated w C2 fusion  10) thyroid nodules (multiple bx neg for malignancy), hx hyperthyroid no longer on methimazole  11) anemia due to chemo    PSHx:  1) Hysterectomy 5 yrs ago  2) bilateral lumpectomies w LN dissections 7 years ago on R and 19 on L  3) cholecystectomy  4) thoracentesis   5) mediport placement L anterior chest    FHX no hx of CA (05 Aug 2017 21:09)      PAST MEDICAL & SURGICAL HISTORY:  Pleural effusion  Breast CA      MEDICATIONS  (STANDING):  sodium chloride 0.9% lock flush 3 milliLiter(s) IV Push every 8 hours  insulin lispro (HumaLOG) corrective regimen sliding scale   SubCutaneous three times a day before meals  dextrose 5%. 1000 milliLiter(s) (50 mL/Hr) IV Continuous <Continuous>  dextrose 50% Injectable 12.5 Gram(s) IV Push once  dextrose 50% Injectable 25 Gram(s) IV Push once  ammonium lactate 12% Lotion 1 Application(s) Topical two times a day  enoxaparin Injectable 75 milliGRAM(s) SubCutaneous every 12 hours  furosemide   Injectable 40 milliGRAM(s) IV Push daily  simvastatin 10 milliGRAM(s) Oral at bedtime  amLODIPine   Tablet 5 milliGRAM(s) Oral at bedtime  calcium carbonate  625 mG + Vitamin D (OsCal 250 + D) 1 Tablet(s) Oral two times a day  pantoprazole  Injectable 40 milliGRAM(s) IV Push two times a day    MEDICATIONS  (PRN):  aluminum hydroxide/magnesium hydroxide/simethicone Suspension 30 milliLiter(s) Oral every 4 hours PRN Dyspepsia  dextrose Gel 1 Dose(s) Oral once PRN Blood Glucose LESS THAN 70 milliGRAM(s)/deciliter  glucagon  Injectable 1 milliGRAM(s) IntraMuscular once PRN Glucose LESS THAN 70 milligrams/deciliter  hydrALAZINE Injectable 10 milliGRAM(s) IV Push every 8 hours PRN BP systolic >180 or BP diastolic >100      Allergies    latex (Unknown)  No Known Drug Allergies    Intolerances        SOCIAL HISTORY: Denies tobacco, etoh abuse or illicit drug use    FAMILY HISTORY:      Vital Signs Last 24 Hrs  T(C): 36.8 (06 Aug 2017 11:28), Max: 37.3 (06 Aug 2017 05:42)  T(F): 98.2 (06 Aug 2017 11:28), Max: 99.1 (06 Aug 2017 05:42)  HR: 96 (06 Aug 2017 11:28) (96 - 113)  BP: 156/76 (06 Aug 2017 11:28) (153/80 - 185/99)  BP(mean): --  RR: 17 (06 Aug 2017 11:28) (16 - 17)  SpO2: 98% (06 Aug 2017 11:28) (96% - 100%)    REVIEW OF SYSTEMS:    CONSTITUTIONAL:  As per HPI.  HEENT:  Eyes:  No diplopia or blurred vision. ENT:  No earache, sore throat or runny nose.  CARDIOVASCULAR:  No pressure, squeezing, strangling, tightness, heaviness or aching about the chest, neck, axilla or epigastrium.  RESPIRATORY:  No cough, shortness of breath, PND or orthopnea.  GASTROINTESTINAL:  No nausea, vomiting or diarrhea.  GENITOURINARY:  No dysuria, frequency or urgency.  MUSCULOSKELETAL:  As per HPI.  SKIN:  No change in skin, hair or nails.  NEUROLOGIC:  No paresthesias, fasciculations, seizures or weakness.  PSYCHIATRIC:  No disorder of thought or mood.  ENDOCRINE:  No heat or cold intolerance, polyuria or polydipsia.  HEMATOLOGICAL:  No easy bruising or bleedings:  .     PHYSICAL EXAMINATION:    GENERAL APPEARANCE:  Pt. is not currently dyspneic, in no distress. Pt. is alert, oriented, and pleasant.  HEENT:  Pupils are normal and react normally. No icterus. Mucous membranes well colored.  NECK:  Supple. No lymphadenopathy. Jugular venous pressure not elevated. Carotids equal.   HEART:   The cardiac impulse has a normal quality. There are no murmurs, rubs or gallops noted  CHEST:  Chest is clear to auscultation. Normal respiratory effort.  ABDOMEN:  Soft and nontender.   EXTREMITIES:  There is no edema.   SKIN:  No rash or significant lesions are noted.    I&O's Summary      LABS:                        8.4    6.7   )-----------( 181      ( 06 Aug 2017 05:52 )             25.7   08-06    140  |  107  |  29<H>  ----------------------------<  98  4.0   |  23  |  1.76<H>    Ca    8.4<L>      06 Aug 2017 05:52  Mg     2.1     08-06    TPro  6.1  /  Alb  3.0<L>  /  TBili  0.8  /  DBili  x   /  AST  29  /  ALT  25  /  AlkPhos  97  08-05  LIVER FUNCTIONS - ( 05 Aug 2017 12:12 )  Alb: 3.0 g/dL / Pro: 6.1 gm/dL / ALK PHOS: 97 U/L / ALT: 25 U/L / AST: 29 U/L / GGT: x           PT/INR - ( 05 Aug 2017 12:12 )   PT: 12.9 sec;   INR: 1.19 ratio         CARDIAC MARKERS ( 05 Aug 2017 12:12 )  <0.015 ng/mL / x     / x     / x     / x          Urinalysis Basic - ( 05 Aug 2017 18:16 )    Color: Yellow / Appearance: Clear / S.005 / pH: x  Gluc: x / Ketone: Negative  / Bili: Negative / Urobili: Negative mg/dL   Blood: x / Protein: 30 mg/dL / Nitrite: Negative   Leuk Esterase: Negative / RBC: 11-25 /HPF / WBC 0-2   Sq Epi: x / Non Sq Epi: x / Bacteria: x    EKG:    < from: 12 Lead ECG (17 @ 12:05) >  Ventricular Rate 103 BPM    Atrial Rate 103 BPM    P-R Interval 148 ms    QRS Duration 84 ms    Q-T Interval 358 ms    QTC Calculation(Bezet) 468 ms    P Axis 59 degrees    R Axis -44 degrees    T Axis 55 degrees    Diagnosis Line Sinus tachycardiaLeft atrial enlargement  Left axis deviation Non-specific intra-ventricular conduction delay  Inferior infarct (cited on or before 28-MAR-2015)  Cannot rule out Anterior infarct , age undetermined Nonspecific ST and T wave abnormality  Abnormal ECG  Confirmed by SOURAV FRY MD ([id number]) on 2017 11:44:45 AM    < end of copied text >      TELEMETRY:    NSR/ST up to 120bpm    CARDIAC TESTS:    RADIOLOGY & ADDITIONAL STUDIES:    < from: Xray Chest 2 Views PA/Lat (17 @ 13:14) >  EXAM:  CHEST P.A. LATERAL                            PROCEDURE DATE:  2017          INTERPRETATION:  Exam Date: 2017 1:14 PM    History: Shortness of breath    Technique: Frontal and lateral views of the chest with comparison to    7/3/2017    Findings:    Left-sided Mediport with tip in the SVC. Heart is mildly prominent. Small   to mild bilateral pleural effusions with bibasilar atelectasis and/or   pneumonia. The apices and hemidiaphragms are unremarkable. Degenerative   changes ofthe visualized osseous structures.    Impression:    Small to mild bilateral pleural effusions with bibasilar atelectasis and   or pneumonia.    < end of copied text >

## 2017-08-06 NOTE — DIETITIAN INITIAL EVALUATION ADULT. - NS AS NUTRI DX NUTRIENT
Malnutrition/Patient meets criteria for  severe protein energy malnutrition in the context of chronic disease (breast cancer) due to <75% PO intake of nutritional needs x 3 months and 9% weight loss in 3 months and  mild-moderate temporal and gastrocnemius muscle wasting

## 2017-08-06 NOTE — CHART NOTE - NSCHARTNOTEFT_GEN_A_CORE
Upon Nutritional Assessment by the Registered Dietitian your patient was determined to meet criteria has evidence of the following diagnosis/diagnoses:        [ ]  Mild Protein Calorie Malnutrition        [ ]  Moderate Protein Calorie Malnutrition        [X] Severe Protein Calorie Malnutrition        [ ] Unspecified Protein Calorie Malnutrition    Findings as based on:  •  Comprehensive nutrition assessment and Nutrition focused physical examination  •  Insufficient food/energy intake  •  Weight loss over time(Please specify time period)  •  Loss of muscle mass  •  Loss of fat mass  •  Fluid accumulation    Findings relevant to patient:  1. <75% PO intake of nutritional needs x 3 months   2. 9% weight loss in 3 month  3. mild-moderate temporal and gastrocnemius muscle wasting      Nutrition Interventions:  1. Add Consistent Carbohydrate Diet (Evenining Snack) to current DASH diet  2. Add 8 oz glucerna daily.  3. Add MVI with minerals daily.    TO BE COMPLETED BY PROVIDER:          [ ] Agree:         [ ] Disagree:    Comments: Upon Nutritional Assessment by the Registered Dietitian your patient was determined to meet criteria has evidence of the following diagnosis/diagnoses:        [ ]  Mild Protein Calorie Malnutrition        [ ]  Moderate Protein Calorie Malnutrition        [X] Severe Protein Calorie Malnutrition        [ ] Unspecified Protein Calorie Malnutrition    Findings as based on:  •  Comprehensive nutrition assessment and Nutrition focused physical examination  •  Insufficient food/energy intake  •  Weight loss over time(Please specify time period)  •  Loss of muscle mass  •  Loss of fat mass  •  Fluid accumulation    Findings relevant to patient:  1. <75% PO intake of nutritional needs x 3 months   2. 9% weight loss in 3 month  3. mild-moderate temporal and gastrocnemius muscle wasting      Nutrition Interventions:  1. Add Consistent Carbohydrate Diet (Evenining Snack) to current DASH diet  2. Add 8 oz glucerna daily.  3. Add MVI with minerals daily.    TO BE COMPLETED BY PROVIDER:          [x ] Agree:         [ ] Disagree:    Comments:

## 2017-08-06 NOTE — PHYSICAL THERAPY INITIAL EVALUATION ADULT - REFERRING PHYSICIAN, REHAB EVAL
Christelle Bronson MD  Activity order : bedrest with BR privileges-spoke with DAYAMI Fuller to ambulate, will update order.

## 2017-08-06 NOTE — DIETITIAN INITIAL EVALUATION ADULT. - SOURCE
patient/Used  Phone ID#: Patient able to speak english, requested diet education in [place]/patient

## 2017-08-06 NOTE — CONSULT NOTE ADULT - PROBLEM SELECTOR RECOMMENDATION 4
- If pt. needs ICD, she should get subcutaneous device given history of bilateral lymph node removal and mastectomy  Pt. quoted 1% risk of complications including but not limited to CVA, MI, cardiac tamponade, pneumothorax, and death with a 3-4% risk of  bleeding, and infection.  Pt. indicated an understanding and agrees to proceed

## 2017-08-06 NOTE — DIETITIAN INITIAL EVALUATION ADULT. - OTHER INFO
66 yr old female w breast CA s/p lumpectomies, LN dissections w last chemo 4 weeks ago, C2 fusion for pathologic fxCHF,  HTN, HLD, DM II presented to  ED w weakness, fatigue and SOB. 66 yr old female w breast CA s/p lumpectomies, LN dissections w last chemo 4 weeks ago, C2 fusion for pathologic fxCHF,  HTN, HLD, DM II presented to  ED w weakness, fatigue and SOB. Patient reports poor PO intake & 15# weight loss x 3 months due to chemotherapy. Patient meets criteria for severe protein calorie malnutrition. Will add 8 oz glucerna at breakfast daily. Suggest adding consistent carbohydrate diet due to hx of DM.

## 2017-08-06 NOTE — CONSULT NOTE ADULT - PROBLEM SELECTOR RECOMMENDATION 9
- given Adriamycin use, this could be chemotherapy induced systolic heart failure  - MUGA scan in AM  - TTE  - IF EF low, will need ischemic evaluation with C with OMT for heart failure and possible ICD in the future if CM does not resolve  - daily weights  - strict I's & O's  - sodium restriction  Low sodium diet.  Read all food labels for sodium content.  Weigh youself every day on the same scale at the same time.  Record and track your weight.  Tell your doctor of any significant weight gain.

## 2017-08-07 LAB
ADD ON TEST-SPECIMEN IN LAB: SIGNIFICANT CHANGE UP
ANION GAP SERPL CALC-SCNC: 8 MMOL/L — SIGNIFICANT CHANGE UP (ref 5–17)
BUN SERPL-MCNC: 27 MG/DL — HIGH (ref 7–23)
CALCIUM SERPL-MCNC: 7.8 MG/DL — LOW (ref 8.5–10.1)
CHLORIDE SERPL-SCNC: 107 MMOL/L — SIGNIFICANT CHANGE UP (ref 96–108)
CO2 SERPL-SCNC: 23 MMOL/L — SIGNIFICANT CHANGE UP (ref 22–31)
CREAT SERPL-MCNC: 1.91 MG/DL — HIGH (ref 0.5–1.3)
GLUCOSE SERPL-MCNC: 95 MG/DL — SIGNIFICANT CHANGE UP (ref 70–99)
HCT VFR BLD CALC: 25 % — LOW (ref 34.5–45)
HGB BLD-MCNC: 8.6 G/DL — LOW (ref 11.5–15.5)
MAGNESIUM SERPL-MCNC: 2.1 MG/DL — SIGNIFICANT CHANGE UP (ref 1.6–2.6)
MCHC RBC-ENTMCNC: 30.8 PG — SIGNIFICANT CHANGE UP (ref 27–34)
MCHC RBC-ENTMCNC: 34.3 GM/DL — SIGNIFICANT CHANGE UP (ref 32–36)
MCV RBC AUTO: 89.7 FL — SIGNIFICANT CHANGE UP (ref 80–100)
NT-PROBNP SERPL-SCNC: HIGH PG/ML (ref 0–125)
PHOSPHATE SERPL-MCNC: 2.9 MG/DL — SIGNIFICANT CHANGE UP (ref 2.5–4.5)
PLATELET # BLD AUTO: 170 K/UL — SIGNIFICANT CHANGE UP (ref 150–400)
POTASSIUM SERPL-MCNC: 3.9 MMOL/L — SIGNIFICANT CHANGE UP (ref 3.5–5.3)
POTASSIUM SERPL-SCNC: 3.9 MMOL/L — SIGNIFICANT CHANGE UP (ref 3.5–5.3)
RBC # BLD: 2.79 M/UL — LOW (ref 3.8–5.2)
RBC # FLD: 13.9 % — SIGNIFICANT CHANGE UP (ref 10.3–14.5)
SODIUM SERPL-SCNC: 138 MMOL/L — SIGNIFICANT CHANGE UP (ref 135–145)
T3FREE SERPL-MCNC: 5.2 PG/ML — HIGH (ref 1.8–4.6)
T4 FREE SERPL-MCNC: 2.42 NG/DL — HIGH (ref 0.76–1.46)
WBC # BLD: 6.1 K/UL — SIGNIFICANT CHANGE UP (ref 3.8–10.5)
WBC # FLD AUTO: 6.1 K/UL — SIGNIFICANT CHANGE UP (ref 3.8–10.5)

## 2017-08-07 PROCEDURE — 93306 TTE W/DOPPLER COMPLETE: CPT | Mod: 26

## 2017-08-07 PROCEDURE — 78472 GATED HEART PLANAR SINGLE: CPT | Mod: 26

## 2017-08-07 RX ADMIN — Medication 1 TABLET(S): at 19:58

## 2017-08-07 RX ADMIN — Medication 1 APPLICATION(S): at 05:07

## 2017-08-07 RX ADMIN — PANTOPRAZOLE SODIUM 40 MILLIGRAM(S): 20 TABLET, DELAYED RELEASE ORAL at 19:57

## 2017-08-07 RX ADMIN — PANTOPRAZOLE SODIUM 40 MILLIGRAM(S): 20 TABLET, DELAYED RELEASE ORAL at 05:07

## 2017-08-07 RX ADMIN — Medication 1 TABLET(S): at 05:07

## 2017-08-07 RX ADMIN — ENOXAPARIN SODIUM 75 MILLIGRAM(S): 100 INJECTION SUBCUTANEOUS at 05:07

## 2017-08-07 RX ADMIN — SODIUM CHLORIDE 3 MILLILITER(S): 9 INJECTION INTRAMUSCULAR; INTRAVENOUS; SUBCUTANEOUS at 22:58

## 2017-08-07 RX ADMIN — ENOXAPARIN SODIUM 75 MILLIGRAM(S): 100 INJECTION SUBCUTANEOUS at 22:59

## 2017-08-07 RX ADMIN — AMLODIPINE BESYLATE 5 MILLIGRAM(S): 2.5 TABLET ORAL at 22:58

## 2017-08-07 RX ADMIN — SODIUM CHLORIDE 3 MILLILITER(S): 9 INJECTION INTRAMUSCULAR; INTRAVENOUS; SUBCUTANEOUS at 05:06

## 2017-08-07 RX ADMIN — Medication 40 MILLIGRAM(S): at 05:07

## 2017-08-07 RX ADMIN — SIMVASTATIN 10 MILLIGRAM(S): 20 TABLET, FILM COATED ORAL at 22:59

## 2017-08-07 NOTE — PROVIDER CONTACT NOTE (OTHER) - SITUATION
Hx : htn, hld, dm 2, chf presented w orthopnea and ARANA   was on lisinopril but increased Cr off med    left message with answering service;  aware of consult.
Spoke to Margret @ doctor's office/ Lance on call for Jaden.
Spoke to Selina @ doctor's office.

## 2017-08-08 LAB
ANION GAP SERPL CALC-SCNC: 9 MMOL/L — SIGNIFICANT CHANGE UP (ref 5–17)
BUN SERPL-MCNC: 31 MG/DL — HIGH (ref 7–23)
CALCIUM SERPL-MCNC: 8.1 MG/DL — LOW (ref 8.5–10.1)
CHLORIDE SERPL-SCNC: 108 MMOL/L — SIGNIFICANT CHANGE UP (ref 96–108)
CO2 SERPL-SCNC: 23 MMOL/L — SIGNIFICANT CHANGE UP (ref 22–31)
CREAT SERPL-MCNC: 1.86 MG/DL — HIGH (ref 0.5–1.3)
GLUCOSE SERPL-MCNC: 102 MG/DL — HIGH (ref 70–99)
POTASSIUM SERPL-MCNC: 4.2 MMOL/L — SIGNIFICANT CHANGE UP (ref 3.5–5.3)
POTASSIUM SERPL-SCNC: 4.2 MMOL/L — SIGNIFICANT CHANGE UP (ref 3.5–5.3)
SODIUM SERPL-SCNC: 140 MMOL/L — SIGNIFICANT CHANGE UP (ref 135–145)

## 2017-08-08 PROCEDURE — 76536 US EXAM OF HEAD AND NECK: CPT | Mod: 26

## 2017-08-08 PROCEDURE — 71010: CPT | Mod: 26

## 2017-08-08 RX ADMIN — Medication 2: at 11:06

## 2017-08-08 RX ADMIN — Medication 40 MILLIGRAM(S): at 05:13

## 2017-08-08 RX ADMIN — SIMVASTATIN 10 MILLIGRAM(S): 20 TABLET, FILM COATED ORAL at 21:10

## 2017-08-08 RX ADMIN — PANTOPRAZOLE SODIUM 40 MILLIGRAM(S): 20 TABLET, DELAYED RELEASE ORAL at 05:13

## 2017-08-08 RX ADMIN — ENOXAPARIN SODIUM 75 MILLIGRAM(S): 100 INJECTION SUBCUTANEOUS at 11:06

## 2017-08-08 RX ADMIN — AMLODIPINE BESYLATE 5 MILLIGRAM(S): 2.5 TABLET ORAL at 21:10

## 2017-08-08 RX ADMIN — PANTOPRAZOLE SODIUM 40 MILLIGRAM(S): 20 TABLET, DELAYED RELEASE ORAL at 18:37

## 2017-08-08 RX ADMIN — SODIUM CHLORIDE 3 MILLILITER(S): 9 INJECTION INTRAMUSCULAR; INTRAVENOUS; SUBCUTANEOUS at 05:13

## 2017-08-08 RX ADMIN — ENOXAPARIN SODIUM 75 MILLIGRAM(S): 100 INJECTION SUBCUTANEOUS at 21:10

## 2017-08-08 RX ADMIN — Medication 1 APPLICATION(S): at 18:37

## 2017-08-08 RX ADMIN — SODIUM CHLORIDE 3 MILLILITER(S): 9 INJECTION INTRAMUSCULAR; INTRAVENOUS; SUBCUTANEOUS at 13:36

## 2017-08-08 RX ADMIN — SODIUM CHLORIDE 3 MILLILITER(S): 9 INJECTION INTRAMUSCULAR; INTRAVENOUS; SUBCUTANEOUS at 21:09

## 2017-08-08 RX ADMIN — Medication 1 TABLET(S): at 18:37

## 2017-08-08 RX ADMIN — Medication 1 TABLET(S): at 05:13

## 2017-08-08 NOTE — CONSULT NOTE ADULT - ASSESSMENT
66 year old woman with history of hyperthyroidism and thyroid nodules now with labs consistent with hyperthyroidism.  -- recommend repeat thyroid ultrasound   -- recommend radioactive iodine uptake and scan, however, will need to check with nuclear medicine attending in terms of timing due to patient's other recent studies  -- will likely need to start treatment (either methimazole or WISDOM) after confirm hyperactivity on WISDOM uptake and scan    Case discussed with Dr. Araiza (hospitalist)

## 2017-08-08 NOTE — CONSULT NOTE ADULT - SUBJECTIVE AND OBJECTIVE BOX
Patient is a 66y old  Female who presents with a chief complaint of SOB and dyspnea on exertion (05 Aug 2017 21:09)      HPI:  66 yr old female w breast CA s/p lumpectomies, LN dissections w last chemo 4 weeks ago, C2 fusion for pathologic fx, CHF,  HTN, HLD, DM II presented to  ED w weakness, fatigue and SOB.  While in the hospital, thyroid function was noted to be consistent with hyperthyroidism.      She reports a history of thyroid nodules and hyperthyroidism.  She had multiple benign thyroid biopsies.  She also reported a history of hyperthyroid treated with methimazole.  The last dose was taken about 2 years ago, when she was told that "she dose not need it anymore".      She reports 30lbs weight loss in the last 3 months.  She has mild intermittent jitteriness.  She denies any anxiety, irritability, or diarrhea.  She also denies any recent change in neck appearance or any trouble swallowing.    PMHx:  1) Breast CA s/p bilat lumpectomies w LN dissections w residual lymphedema L  had adriamycin and tamoxifen in the past  Unable to tell me name of last chemo  2) Pathologic fx necessitating C2 fusion 10 yrs ago and lesion at L iliac crest  3) Elevated Cr 2.5 on 07-  4) Hx R pleural effusion (benign)  5) CHF controlled w ACE-I and lasix  unknown systolic and diastolic  6) HTN  7) HLD  8) DMII on oral agent  9) hx of ICU stay after hysterectomy 5 yrs ago when intubated w C2 fusion  10) thyroid nodules (multiple bx neg for malignancy), hx hyperthyroid no longer on methimazole  11) anemia due to chemo    PSHx:  1) Hysterectomy 5 yrs ago  2) bilateral lumpectomies w LN dissections 7 years ago on R and 19 on L  3) cholecystectomy  4) thoracentesis   5) mediport placement L anterior chest    FHX no hx of CA (05 Aug 2017 21:09)      PAST MEDICAL & SURGICAL HISTORY:  Pleural effusion  Breast CA    HEALTH ISSUES - PROBLEM Dx:  Essential hypertension: Essential hypertension  Preop cardiovascular exam: Preop cardiovascular exam  SOB (shortness of breath): SOB (shortness of breath)  Pleural effusion: Pleural effusion  Acute systolic heart failure: Acute systolic heart failure      MEDICATIONS  (STANDING):  sodium chloride 0.9% lock flush 3 milliLiter(s) IV Push every 8 hours  insulin lispro (HumaLOG) corrective regimen sliding scale   SubCutaneous three times a day before meals  dextrose 5%. 1000 milliLiter(s) (50 mL/Hr) IV Continuous <Continuous>  dextrose 50% Injectable 12.5 Gram(s) IV Push once  dextrose 50% Injectable 25 Gram(s) IV Push once  ammonium lactate 12% Lotion 1 Application(s) Topical two times a day  enoxaparin Injectable 75 milliGRAM(s) SubCutaneous every 12 hours  furosemide   Injectable 40 milliGRAM(s) IV Push daily  simvastatin 10 milliGRAM(s) Oral at bedtime  amLODIPine   Tablet 5 milliGRAM(s) Oral at bedtime  calcium carbonate  625 mG + Vitamin D (OsCal 250 + D) 1 Tablet(s) Oral two times a day  pantoprazole  Injectable 40 milliGRAM(s) IV Push two times a day      Allergies  latex (Unknown)  No Known Drug Allergies    FAMILY HISTORY:  No known thyroid cancer      SOCIAL HISTORY:  Denies Tob or EtOH use  Lives with daughter    REVIEW OF SYSTEMS  weight loss, jittery, weakness  ROS otherwise negative    Vital Signs Last 24 Hrs  T(C): 36.7 (08 Aug 2017 10:24), Max: 37.1 (08 Aug 2017 04:07)  T(F): 98 (08 Aug 2017 10:24), Max: 98.8 (08 Aug 2017 04:07)  HR: 94 (08 Aug 2017 10:24) (89 - 102)  BP: 137/51 (08 Aug 2017 10:24) (137/51 - 158/85)  BP(mean): --  RR: 18 (08 Aug 2017 10:24) (16 - 18)  SpO2: 95% (08 Aug 2017 10:24) (94% - 100%)    PHYSICAL EXAM:   · CONSTITUTIONAL: Well appearing, well nourished, awake, alert, oriented to person, place, time/situation and in no apparent distress.	  · ENT: Airway patent, Nasal mucosa clear. Mouth with normal mucosa. Thyroid is enlarged and nodular	  · HEAD: Head atraumatic, normal cephalic shape.	  · EYES: Clear bilaterally, pupils equal, round and reactive to light.	  · CARDIAC: Normal rate, regular rhythm.  Heart sounds S1, S2.  No murmurs, rubs or gallops.	  · RESPIRATORY: Breath sounds clear and equal bilaterally. 	  · GASTROINTESTINAL: Abdomen soft, non-tender, no guarding.	  · MUSCULOSKELETAL: Spine appears normal. No deformity	  · NEUROLOGICAL: Alert and oriented, no focal deficits, no motor or sensory deficits.	  · SKIN: Skin normal color for race, warm, dry and intact. No evidence of rash.	  · PSYCHIATRIC: Alert and oriented to person, place, time/situation. Normal mood and affect. no apparent risk to self or other    08-08    140  |  108  |  31<H>  ----------------------------<  102<H>  4.2   |  23  |  1.86<H>    Ca    8.1<L>      08 Aug 2017 05:59  Phos  2.9     08-07  Mg     2.1     08-07    Thyroid Stimulating Hormone, Serum: <0.005 uU/mL (08.06.17 @ 05:52)    Thyroid Stimulating Hormone, Serum: <0.005 uIU/mL (07.03.17 @ 16:14)    Free Thyroxine, Serum: 2.42 ng/dL (08.07.17 @ 05:27)    Aspartate Aminotransferase (AST/SGOT): 29 U/L (08.05.17 @ 12:12)    Alanine Aminotransferase (ALT/SGPT): 25 U/L (08.05.17 @ 12:12)    WBC Count: 7.3 K/uL (08.05.17 @ 12:12)    Hemoglobin: 8.6 g/dL (08.07.17 @ 21:35)    Hematocrit: 25.0 % (08.07.17 @ 21:35)    Platelet Count - Automated: 170 K/uL (08.07.17 @ 21:35)

## 2017-08-09 PROCEDURE — 78582 LUNG VENTILAT&PERFUS IMAGING: CPT | Mod: 26

## 2017-08-09 RX ADMIN — Medication 1 TABLET(S): at 05:31

## 2017-08-09 RX ADMIN — SIMVASTATIN 10 MILLIGRAM(S): 20 TABLET, FILM COATED ORAL at 22:23

## 2017-08-09 RX ADMIN — Medication 40 MILLIGRAM(S): at 05:31

## 2017-08-09 RX ADMIN — Medication 1 APPLICATION(S): at 17:16

## 2017-08-09 RX ADMIN — Medication 1 TABLET(S): at 17:17

## 2017-08-09 RX ADMIN — PANTOPRAZOLE SODIUM 40 MILLIGRAM(S): 20 TABLET, DELAYED RELEASE ORAL at 05:31

## 2017-08-09 RX ADMIN — AMLODIPINE BESYLATE 5 MILLIGRAM(S): 2.5 TABLET ORAL at 22:23

## 2017-08-09 RX ADMIN — SODIUM CHLORIDE 3 MILLILITER(S): 9 INJECTION INTRAMUSCULAR; INTRAVENOUS; SUBCUTANEOUS at 22:23

## 2017-08-09 RX ADMIN — SODIUM CHLORIDE 3 MILLILITER(S): 9 INJECTION INTRAMUSCULAR; INTRAVENOUS; SUBCUTANEOUS at 14:58

## 2017-08-09 RX ADMIN — ENOXAPARIN SODIUM 75 MILLIGRAM(S): 100 INJECTION SUBCUTANEOUS at 11:23

## 2017-08-09 RX ADMIN — PANTOPRAZOLE SODIUM 40 MILLIGRAM(S): 20 TABLET, DELAYED RELEASE ORAL at 17:16

## 2017-08-09 RX ADMIN — SODIUM CHLORIDE 3 MILLILITER(S): 9 INJECTION INTRAMUSCULAR; INTRAVENOUS; SUBCUTANEOUS at 05:32

## 2017-08-09 NOTE — CDI QUERY NOTE - NSCDICHFTXTBX_GEN_ALL_CORE_HH
1) CHF with diastolic dysfunction documented as well as acute systolic chf.  BNP= 10,088 > 15,972  Echo= EF 65-70% with pleural effusion present  On Lasix 40mg IV qday    Please clarify the type and the acuity of the CHF.  ie.. Acute ? Acute on chronic ? Chronic ? Other ?  .....Diastolic CHF ? Systolic CHF ? Combination of Diastolic and systolic CHF ? Other ?

## 2017-08-09 NOTE — CDI QUERY NOTE - NSCDIOTHERTXTBX_GEN_ALL_CORE_HH
2) Per nutritionists consult : Malnutrition; Patient meets criteria for  severe protein energy malnutrition in the context of chronic disease (breast cancer) due to <75% PO intake of nutritional needs x 3 months and 9% weight loss in 3 months and  mild-moderate temporal and gastrocnemius muscle wasting.    Please clarify if you agree or disagree with the clinical diagnosis of severe protein calorie malnutrition ?

## 2017-08-10 ENCOUNTER — TRANSCRIPTION ENCOUNTER (OUTPATIENT)
Age: 67
End: 2017-08-10

## 2017-08-10 VITALS — WEIGHT: 154.76 LBS

## 2017-08-10 LAB
CULTURE RESULTS: SIGNIFICANT CHANGE UP
OB PNL STL: NEGATIVE — SIGNIFICANT CHANGE UP
SPECIMEN SOURCE: SIGNIFICANT CHANGE UP

## 2017-08-10 RX ORDER — METFORMIN HYDROCHLORIDE 850 MG/1
1 TABLET ORAL
Qty: 0 | Refills: 0 | COMMUNITY

## 2017-08-10 RX ORDER — METHIMAZOLE 10 MG/1
1 TABLET ORAL
Qty: 30 | Refills: 0 | OUTPATIENT
Start: 2017-08-10 | End: 2017-09-09

## 2017-08-10 RX ORDER — POTASSIUM CHLORIDE 20 MEQ
1 PACKET (EA) ORAL
Qty: 30 | Refills: 0 | OUTPATIENT
Start: 2017-08-10 | End: 2017-09-09

## 2017-08-10 RX ORDER — FUROSEMIDE 40 MG
1 TABLET ORAL
Qty: 30 | Refills: 0 | OUTPATIENT
Start: 2017-08-10 | End: 2017-09-09

## 2017-08-10 RX ADMIN — SODIUM CHLORIDE 3 MILLILITER(S): 9 INJECTION INTRAMUSCULAR; INTRAVENOUS; SUBCUTANEOUS at 06:22

## 2017-08-10 RX ADMIN — PANTOPRAZOLE SODIUM 40 MILLIGRAM(S): 20 TABLET, DELAYED RELEASE ORAL at 06:27

## 2017-08-10 RX ADMIN — Medication 40 MILLIGRAM(S): at 06:23

## 2017-08-10 RX ADMIN — Medication 1 TABLET(S): at 06:24

## 2017-08-10 NOTE — DISCHARGE NOTE ADULT - PROVIDER TOKENS
TOKEN:'[id number]',TOKEN:'[id number]',FREE:[LAST:[cardiology],PHONE:[(   )    -],FAX:[(   )    -]]

## 2017-08-10 NOTE — DISCHARGE NOTE ADULT - CARE PLAN
Principal Discharge DX:	Pleural effusion  Goal:	prevent recurrence  Instructions for follow-up, activity and diet:	take lasix as prescribed and follow up with primary care physician/cardiology for further dose titration.  You will have to have your kidney function monitored (with blood work) by your primary care physician and referral made to nephrology if necessary.  Secondary Diagnosis:	Hyperthyroidism  Goal:	treatment  Instructions for follow-up, activity and diet:	take methimazole as prescribed and follow up with endocrinology in 2 weeks.  Secondary Diagnosis:	Breast CA  Goal:	treatment  Instructions for follow-up, activity and diet:	follow up with oncology as outpatient.  Secondary Diagnosis:	DM (diabetes mellitus)  Goal:	treatment if necessary  Instructions for follow-up, activity and diet:	stop metformin as your kidney function is abnormal  check sugars daily, if persistently elevated, call endocrinology to start new medicine for diabetes.

## 2017-08-10 NOTE — DISCHARGE NOTE ADULT - MEDICATION SUMMARY - MEDICATIONS TO STOP TAKING
I will STOP taking the medications listed below when I get home from the hospital:    lisinopril 10 mg oral tablet  -- 1 tab(s) by mouth once a day    metFORMIN 500 mg oral tablet  -- 1 tab(s) by mouth 2 times a day

## 2017-08-10 NOTE — DISCHARGE NOTE ADULT - MEDICATION SUMMARY - MEDICATIONS TO TAKE
I will START or STAY ON the medications listed below when I get home from the hospital:    simvastatin 10 mg oral tablet  -- 1 tab(s) by mouth once a day (at bedtime)  -- Indication: For home med    methIMAzole 10 mg oral tablet  -- 1 tab(s) by mouth once a day  -- Indication: For hyperthyroidism    Norvasc 5 mg oral tablet  -- 1 tab(s) by mouth once a day (at bedtime)  -- Indication: For home med    Lasix 40 mg oral tablet  -- 1 tab(s) by mouth once a day  -- Avoid prolonged or excessive exposure to direct and/or artificial sunlight while taking this medication.  It is very important that you take or use this exactly as directed.  Do not skip doses or discontinue unless directed by your doctor.  It may be advisable to drink a full glass orange juice or eat a banana daily while taking this medication.    -- Indication: For Pleural effusion/congestive heart failure    Pepcid AC 10 mg oral tablet  -- 1 tab(s) by mouth once  -- Indication: For homemed    Klor-Con 10 oral tablet, extended release  -- 1 tab(s) by mouth once a day. on days taking lasix.  -- It is very important that you take or use this exactly as directed.  Do not skip doses or discontinue unless directed by your doctor.  Medication should be taken with plenty of water.  Take with food or milk.    -- Indication: For low potassium associated with lasix.    Calcium 600+D oral tablet  -- 1 tab(s) by mouth 2 times a day  -- Indication: For home med

## 2017-08-10 NOTE — DISCHARGE NOTE ADULT - HOSPITAL COURSE
66F, pmh of HTN, HLD, DMII, endometrial ca s/p vicky/bso, carpal tunnel, nodular goiter, breast ca left breast with metastatic recurrence, right breast ca (diff pathology), C2 fusion for pathologic fx, Ln dissections, chemo (now taking a break), pleural effusions s/p thoracentesis with neg cytology,  was scheduled for CT with iv contrast but renal function was noted to be abnormal (creat 2.5 ).  Was taken off lasix/and ace-i since 9 days pta. She presented to the ER with worsening sob since stopping her lasix. Worse with exertion and laying flat. Also has chronic LUE lymphedema, but noticed it was worse than usual. Also had LE edema which improved with elevation. In ed, CXR showed very small pleural effusions bilaterally w basilar atelectasis.  D-dimers were elevated Dopplers were neg for DVT.  Due to elevated Cr she could not have a CTA so lovenox was administered and she was admitted. VQ scan was neg for PE and a/c was discontinued. She underwent a 2Decho which showed preserved LVEF. MUGA showed good LV function as well but with impaired relaxation.  She was noted to have Low TSH with elevated T3/T4. Seen by endocrine and sono/WISDOM uptake scan was recommended. Thyroid sono showed multiple nodules for which biopsy was recommended. She was unable to have a thyroid uptake scan as the injection material was not available here. Per d/w Dr. Lucas from endocrinology, she was started on methimazole and was advised outpt f/u.  Regarding her DM, she was taking metformin prior to admission which was stopped here. Her GFR remains below 30, therefore she has been advised to stop metformin. She has been advised to monitor her sugars and to call endocrinology if running high to start a new OHA. Sugars here have remained stable off metformin and hgba1c was 4.8.   At this time her ARANA was felt to be related to Pleural effusions due to diastolic CHF resulting from discontinuation of lasix as outpt (for iza) and have improved on repeat CXR with diuresis. She will be discharged home on po lasix and advised close outpt f/u. She plans on transitioning her care from [facility] to [facility]. Copies of her reports have been given to the patient and she will f/u with Dr. Parker her oncologist at [facility] one last time before setting up care here on [place].     Vital Signs Last 24 Hrs  T(C): 36.9 (10 Aug 2017 12:03), Max: 37.1 (09 Aug 2017 20:57)  T(F): 98.5 (10 Aug 2017 12:03), Max: 98.8 (09 Aug 2017 20:57)  HR: 90 (10 Aug 2017 12:03) (89 - 101)  BP: 159/88 (10 Aug 2017 12:03) (140/52 - 159/88)  RR: 14 (10 Aug 2017 12:03) (14 - 17)  SpO2: 100% (10 Aug 2017 12:03) (98% - 100%)    PHYSICAL EXAM:  GENERAL: Comfortable, no acute distress   HEAD:  Normocephalic, atraumatic  EYES: EOMI, PERRLA  HEENT: Moist mucous membranes  NECK: Supple, No JVD  NERVOUS SYSTEM:  Alert & Oriented X3, Motor Strength 5/5 B/L upper and lower extremities  CHEST/LUNG: Clear to auscultation bilaterally  HEART: Regular rate and rhythm  ABDOMEN: Soft, Nontender, Nondistended, Bowel sounds present  GENITOURINARY: Voiding, no palpable bladder  EXTREMITIES:   No clubbing, cyanosis, or edema  MUSCULOSKELTAL- No muscle tenderness, no joint tenderness  SKIN-no rash      Labs:             8.6    6.1   )-----------( 170      ( 07 Aug 2017 21:35 )             25.0     08-08    140  |  108  |  31<H>  ----------------------------<  102<H>  4.2   |  23  |  1.86<H>    Ca    8.1<L>      08 Aug 2017 05:59  Phos  2.9     08-07  Mg     2.1     08-07    CAPILLARY BLOOD GLUCOSE  171 (08 Aug 2017 11:05)  93 (08 Aug 2017 07:57)  120 (07 Aug 2017 22:51)  88 (07 Aug 2017 17:13)     Xray Chest 1 View AP/PA. (08.08.17 @ 10:30) >  Impression: No active pulmonary disease.    NM Pulmonary Ventilation/Perfusion Scan (08.09.17 @ 11:15) >    IMPRESSION: Normal ventilation/perfusion lung scan.    No radionuclide evidence of pulmonary embolus.    NM MUGA Scan (08.07.17 @ 16:00)   IMPRESSION: Normal gated blood pool study.    Normal resting left ventricular ejection fraction of 63 % and normal   right and left ventricular wall motion.    FINAL DIAGNOSIS:  1. ARANA due to pleural effusions from acute diastolic CHF exacerbation now resolved  2. H/o Breast ca s/p lumpectomies/ln dissection/chemo/RT:  3. Hyperthyroidism:  4. HTN  5. HLD  6. DMII  7. IZA    Time taken in preparation for discharge 80 minutes  plan d/w patient and daughter in detail  pt has no pcp, but hospital course d/w patient's oncologist Amauri Parker @ [facility].   Pt to set up pcp here on [place].
Declines

## 2017-08-10 NOTE — DISCHARGE NOTE ADULT - CARE PROVIDER_API CALL
Amador Mccann), Internal Medicine  [address]  [address]  [place] [address]  Phone: [phone]  Fax: [phone]    Lizy Lucas), EndocrinologyMetabDiabetes; Internal Medicine  [address] [place] [address]  Phone: [phone]  Fax: [phone]    cardiology,   Phone: (   )    -  Fax: (   )    -

## 2017-08-10 NOTE — PROGRESS NOTE ADULT - SUBJECTIVE AND OBJECTIVE BOX
SUBJECTIVE     patient denies symptoms of sob or cough or wheeze or fever and vq scan is normal with no PE and full anticoagulation was discontinued . waiting for the radio scan for the hyperthyroidism . follow up cxr showed improved chf .         PAST MEDICAL & SURGICAL HISTORY:  Pleural effusion  Breast CA metastatic   CHF   hyperthyroidism           OBJECTIVE       Vital Signs Last 24 Hrs  T(C): 37 (10 Aug 2017 04:09), Max: 37.1 (09 Aug 2017 20:57)  T(F): 98.6 (10 Aug 2017 04:09), Max: 98.8 (09 Aug 2017 20:57)  HR: 89 (10 Aug 2017 06:22) (89 - 101)  BP: 140/52 (10 Aug 2017 06:22) (140/52 - 150/92)  BP(mean): --  RR: 17 (10 Aug 2017 04:09) (17 - 17)  SpO2: 98% (10 Aug 2017 04:09) (98% - 100%)    PHYSICAL EXAM:    Constitutional: , awake and alert, not in distress.     HEENT: Normo cephalic atraumatic     Neck: Soft and supple, No J.V.D     Respiratory: vesicular breathing , No wheezing, rales or rhonchi    Cardiovascular: S1 and S2, regular rate .     Gastrointestinal:  soft, nontender,     Extremities: chronic lymphedema of the left arm    Neurological: A/O x 3, no focal deficits.        MEDICATIONS  (STANDING):  sodium chloride 0.9% lock flush 3 milliLiter(s) IV Push every 8 hours  insulin lispro (HumaLOG) corrective regimen sliding scale   SubCutaneous three times a day before meals  dextrose 5%. 1000 milliLiter(s) (50 mL/Hr) IV Continuous <Continuous>  dextrose 50% Injectable 12.5 Gram(s) IV Push once  dextrose 50% Injectable 25 Gram(s) IV Push once  ammonium lactate 12% Lotion 1 Application(s) Topical two times a day  furosemide   Injectable 40 milliGRAM(s) IV Push daily  simvastatin 10 milliGRAM(s) Oral at bedtime  amLODIPine   Tablet 5 milliGRAM(s) Oral at bedtime  calcium carbonate  625 mG + Vitamin D (OsCal 250 + D) 1 Tablet(s) Oral two times a day  pantoprazole  Injectable 40 milliGRAM(s) IV Push two times a day  methimazole 10 milliGRAM(s) Oral daily    Labs: All Labs Reviewed:              no recent labs
SUBJECTIVE     patient feeling better with the sob and could not have vq scan yesterday with recent MUGA scan and she denies any fever or cough or wheezing and leg edema is improving with less orthopnea         PAST MEDICAL & SURGICAL HISTORY:  Pleural effusion  Breast CA metastatic   CHF   hyperthyroidism           OBJECTIVE       Vital Signs Last 24 Hrs  T(C): 36.9 (09 Aug 2017 05:07), Max: 36.9 (08 Aug 2017 21:06)  T(F): 98.5 (09 Aug 2017 05:07), Max: 98.5 (09 Aug 2017 05:07)  HR: 100 (09 Aug 2017 05:07) (87 - 100)  BP: 147/75 (09 Aug 2017 05:07) (137/51 - 161/80)  BP(mean): --  RR: 16 (08 Aug 2017 21:06) (16 - 18)  SpO2: 98% (09 Aug 2017 05:07) (95% - 100%)    PHYSICAL EXAM:    Constitutional: , awake and alert, not in distress.     HEENT: Normo cephalic atraumatic     Neck: Soft and supple, No J.V.D     Respiratory: vesicular breathing , No wheezing, rales or rhonchi    Cardiovascular: S1 and S2, regular rate .     Gastrointestinal:  soft, nontender,     Extremities: chronic lymphedema of the left arm    Neurological: A/O x 3, no focal deficits.      Medications:  MEDICATIONS  (STANDING):  sodium chloride 0.9% lock flush 3 milliLiter(s) IV Push every 8 hours  insulin lispro (HumaLOG) corrective regimen sliding scale   SubCutaneous three times a day before meals  dextrose 5%. 1000 milliLiter(s) (50 mL/Hr) IV Continuous <Continuous>  dextrose 50% Injectable 12.5 Gram(s) IV Push once  dextrose 50% Injectable 25 Gram(s) IV Push once  ammonium lactate 12% Lotion 1 Application(s) Topical two times a day  enoxaparin Injectable 75 milliGRAM(s) SubCutaneous every 12 hours  furosemide   Injectable 40 milliGRAM(s) IV Push daily  simvastatin 10 milliGRAM(s) Oral at bedtime  amLODIPine   Tablet 5 milliGRAM(s) Oral at bedtime  calcium carbonate  625 mG + Vitamin D (OsCal 250 + D) 1 Tablet(s) Oral two times a day  pantoprazole  Injectable 40 milliGRAM(s) IV Push two times a day      Labs: All Labs Reviewed:                        8.6    6.1   )-----------( 170      ( 07 Aug 2017 21:35 )             25.0     08-08    140  |  108  |  31<H>  ----------------------------<  102<H>  4.2   |  23  |  1.86<H>    Ca    8.1<L>      08 Aug 2017 05:59
c/c: sob/ARANA    HPI: 66F, pmh of HTN, HLD, DMII, CHF, breast ca left breast with metastatic recurrence, right breast ca (diff pathology), C2 fusion for pathologic fx, Ln dissections, chemo (now taking a break), was scheduled for CT with iv contrast but renal function was noted to be abnormal (creat 2.5 ).  Was taken off lasix/and ace-i since 9 days pta. She presented to the ER with worsening sob since stopping her lasix. Worse with exertion and laying flat. Also has chronic LUE lymphedema, but noticed it was worse than usual. Also had LE edema which improved with elevation. In ed, CXR showed very small pleural effusions bilaterally w baseline atelectasis.  D-dimers were elevated Dopplers were neg for DVT.  Due to elevated Cr she could not have a CTA so lovenox was administered and she was admitted.      pt seen and examined with daughter at bedside. Feeling better than yesterday. Still with arana but much improved. No le edema. No chest pain/pleuritic pain. No hypoxia.     Review of system- All 10 systems reviewed and is as per HPI otherwise negative.           T(C): 37 (17 @ 16:00), Max: 37.3 (17 @ 05:42)  HR: 94 (17 @ 16:00) (94 - 113)  BP: 148/67 (17 @ 16:00) (148/67 - 185/99)  RR: 14 (17 @ 16:00) (14 - 17)  SpO2: 98% (17 @ 16:00) (96% - 100%)      PHYSICAL EXAM:    GENERAL: Comfortable, no acute distress  HEAD:  Atraumatic, Normocephalic  EYES: EOMI, PERRLA  HEENT: Moist mucous membranes  NECK: Supple, No JVD  NERVOUS SYSTEM:  Alert & Oriented X3, Motor Strength 5/5 B/L upper and lower extremities  CHEST/LUNG: Clear to auscultation bilaterally  HEART: s1, s2+, Regular rate and rhythm;   ABDOMEN: Soft, Nontender, Nondistended; Bowel sounds present  GENITOURINARY- Voiding, no palpable bladder  EXTREMITIES:  +LUE lymphedema++  MUSCULOSKELTAL- No muscle tenderness, No joint tenderness  SKIN-no rash        LABS:                        8.4    6.7   )-----------( 181      ( 06 Aug 2017 05:52 )             25.7     08-06    140  |  107  |  29<H>  ----------------------------<  98  4.0   |  23  |  1.76<H>    Ca    8.4<L>      06 Aug 2017 05:52  Mg     2.1     08-    TPro  6.1  /  Alb  3.0<L>  /  TBili  0.8  /  DBili  x   /  AST  29  /  ALT  25  /  AlkPhos  97  08-    PT/INR - ( 05 Aug 2017 12:12 )   PT: 12.9 sec;   INR: 1.19 ratio           Urinalysis Basic - ( 05 Aug 2017 18:16 )    Color: Yellow / Appearance: Clear / S.005 / pH: x  Gluc: x / Ketone: Negative  / Bili: Negative / Urobili: Negative mg/dL   Blood: x / Protein: 30 mg/dL / Nitrite: Negative   Leuk Esterase: Negative / RBC: 11-25 /HPF / WBC 0-2   Sq Epi: x / Non Sq Epi: x / Bacteria: x        CAPILLARY BLOOD GLUCOSE  114 (06 Aug 2017 12:09)  110 (06 Aug 2017 08:14)              MEDS  aluminum hydroxide/magnesium hydroxide/simethicone Suspension 30 milliLiter(s) Oral every 4 hours PRN  sodium chloride 0.9% lock flush 3 milliLiter(s) IV Push every 8 hours  insulin lispro (HumaLOG) corrective regimen sliding scale   SubCutaneous three times a day before meals  dextrose 5%. 1000 milliLiter(s) IV Continuous <Continuous>  dextrose Gel 1 Dose(s) Oral once PRN  dextrose 50% Injectable 12.5 Gram(s) IV Push once  dextrose 50% Injectable 25 Gram(s) IV Push once  glucagon  Injectable 1 milliGRAM(s) IntraMuscular once PRN  ammonium lactate 12% Lotion 1 Application(s) Topical two times a day  enoxaparin Injectable 75 milliGRAM(s) SubCutaneous every 12 hours  furosemide   Injectable 40 milliGRAM(s) IV Push daily  simvastatin 10 milliGRAM(s) Oral at bedtime  amLODIPine   Tablet 5 milliGRAM(s) Oral at bedtime  calcium carbonate  625 mG + Vitamin D (OsCal 250 + D) 1 Tablet(s) Oral two times a day  pantoprazole  Injectable 40 milliGRAM(s) IV Push two times a day  hydrALAZINE Injectable 10 milliGRAM(s) IV Push every 8 hours PRN    66F, pmh as above a/w:    1. ARANA:  -most likely related to acute chf exacerbation related to discontinuation of lasix  -?cardiomyopathy related to chemo  -for muga scan per Dr. Sands.  -continue iv lasix  -seems to be improving.  -On lovenox for possible PE    2. H/o Breast ca s/p lumpectomies/ln dissection/chemo/RT:  -follows with Dr. Parker @ [facility] in [place].  -will contact in am re: recent imaging done.     3. HTN:  -on hydralazine for now   -off ace due to iza    4. HLD:  -on statin    5. DMII:  -sliding scale.    6. IZA:  -seems to be improving.  -will d/w oncology in am.     d/w Patient/daughter at bedside at length.
c/c: sob/ARANA    HPI: 66F, pmh of HTN, HLD, DMII, endometrial ca s/p vicky/bso, carpal tunnel, nodular goiter,  breast ca left breast with metastatic recurrence, right breast ca (diff pathology), C2 fusion for pathologic fx, Ln dissections, chemo (now taking a break), pleural effusions s/p thoracentesis with neg cytology,  was scheduled for CT with iv contrast but renal function was noted to be abnormal (creat 2.5 ).  Was taken off lasix/and ace-i since 9 days pta. She presented to the ER with worsening sob since stopping her lasix. Worse with exertion and laying flat. Also has chronic LUE lymphedema, but noticed it was worse than usual. Also had LE edema which improved with elevation. In ed, CXR showed very small pleural effusions bilaterally w baseline atelectasis.  D-dimers were elevated Dopplers were neg for DVT.  Due to elevated Cr she could not have a CTA so lovenox was administered and she was admitted.     : pt seen and examined. s/p muga scan today. Echo showed preserved LVEF. Pt feeling better. ARANA improving.     Review of system- All 10 systems reviewed and is as per HPI otherwise negative.   Vital Signs Last 24 Hrs  T(C): 36.4 (07 Aug 2017 10:07), Max: 36.8 (07 Aug 2017 04:54)  T(F): 97.5 (07 Aug 2017 10:07), Max: 98.2 (07 Aug 2017 04:54)  HR: 90 (07 Aug 2017 10:07) (89 - 90)  BP: 149/93 (07 Aug 2017 10:07) (142/56 - 149/93)  BP(mean): --  RR: --  SpO2: 97% (07 Aug 2017 10:07) (95% - 97%)  PHYSICAL EXAM:    GENERAL: Comfortable, no acute distress  HEAD:  Atraumatic, Normocephalic  EYES: EOMI, PERRLA  HEENT: Moist mucous membranes  NECK: Supple, No JVD  NERVOUS SYSTEM:  Alert & Oriented X3, Motor Strength 5/5 B/L upper and lower extremities  CHEST/LUNG: Clear to auscultation bilaterally, decreased bs at bases.  HEART: s1, s2+, Regular rate and rhythm;   ABDOMEN: Soft, Nontender, Nondistended; Bowel sounds present  GENITOURINARY- Voiding, no palpable bladder  EXTREMITIES:  +LUE lymphedema++  MUSCULOSKELTAL- No muscle tenderness, No joint tenderness  SKIN-no rash      LABS:                        8.4    6.7   )-----------( 181      ( 06 Aug 2017 05:52 )             25.7     08-07    138  |  107  |  27<H>  ----------------------------<  95  3.9   |  23  |  1.91<H>    Ca    7.8<L>      07 Aug 2017 05:27  Phos  2.9     08-07  Mg     2.1     08-07        Urinalysis Basic - ( 05 Aug 2017 18:16 )    Color: Yellow / Appearance: Clear / S.005 / pH: x  Gluc: x / Ketone: Negative  / Bili: Negative / Urobili: Negative mg/dL   Blood: x / Protein: 30 mg/dL / Nitrite: Negative   Leuk Esterase: Negative / RBC: 11-25 /HPF / WBC 0-2   Sq Epi: x / Non Sq Epi: x / Bacteria: x        CAPILLARY BLOOD GLUCOSE  110 (07 Aug 2017 05:20)  111 (06 Aug 2017 17:21)          MEDS  aluminum hydroxide/magnesium hydroxide/simethicone Suspension 30 milliLiter(s) Oral every 4 hours PRN  sodium chloride 0.9% lock flush 3 milliLiter(s) IV Push every 8 hours  insulin lispro (HumaLOG) corrective regimen sliding scale   SubCutaneous three times a day before meals  dextrose 5%. 1000 milliLiter(s) IV Continuous <Continuous>  dextrose Gel 1 Dose(s) Oral once PRN  dextrose 50% Injectable 12.5 Gram(s) IV Push once  dextrose 50% Injectable 25 Gram(s) IV Push once  glucagon  Injectable 1 milliGRAM(s) IntraMuscular once PRN  ammonium lactate 12% Lotion 1 Application(s) Topical two times a day  enoxaparin Injectable 75 milliGRAM(s) SubCutaneous every 12 hours  furosemide   Injectable 40 milliGRAM(s) IV Push daily  simvastatin 10 milliGRAM(s) Oral at bedtime  amLODIPine   Tablet 5 milliGRAM(s) Oral at bedtime  calcium carbonate  625 mG + Vitamin D (OsCal 250 + D) 1 Tablet(s) Oral two times a day  pantoprazole  Injectable 40 milliGRAM(s) IV Push two times a day  hydrALAZINE Injectable 10 milliGRAM(s) IV Push every 8 hours PRN    66F, pmh as above a/w:    1. ARANA:  -most likely related to acute chf exacerbation/pleural effusions related to discontinuation of lasix  -?cardiomyopathy related to chemo  -f/u muga scan  -continue iv lasix  -seems to be improving.  -On lovenox for possible PE  -recent pet scan results obtained from oncologist dr. parker which showed mod-large pleural effusions.   -?will d/w pulmonary re: ct for further evaluation.    2. H/o Breast ca s/p lumpectomies/ln dissection/chemo/RT:  -follows with Dr. Parker @ [facility] in [place].  -PET scan results obtained and placed in chart    3. Hyperthyroidism:  -pt had multiple biopsies in past which were neg for cancer per patient  -was on "thyroid medicine" which she no longer takes.  -will consult endocrine.     3. HTN:  -on hydralazine for now   -off ace due to iza    4. HLD:  -on statin    5. DMII:  -sliding scale.    6. IZA:  -seems to be improving.  -last creatinine  2.5    d/w Patient/daughter
c/c: sob/ARANA    HPI: 66F, pmh of HTN, HLD, DMII, endometrial ca s/p vicky/bso, carpal tunnel, nodular goiter,  breast ca left breast with metastatic recurrence, right breast ca (diff pathology), C2 fusion for pathologic fx, Ln dissections, chemo (now taking a break), pleural effusions s/p thoracentesis with neg cytology,  was scheduled for CT with iv contrast but renal function was noted to be abnormal (creat 2.5 ).  Was taken off lasix/and ace-i since 9 days pta. She presented to the ER with worsening sob since stopping her lasix. Worse with exertion and laying flat. Also has chronic LUE lymphedema, but noticed it was worse than usual. Also had LE edema which improved with elevation. In ed, CXR showed very small pleural effusions bilaterally w baseline atelectasis.  D-dimers were elevated Dopplers were neg for DVT.  Due to elevated Cr she could not have a CTA so lovenox was administered and she was admitted. She underwent a 2Decho which showed preserved LVEF. MUGA showed good LV function as well. SHe was noted to have Low TSH with elevated T3/T4. Seen by endocrine and sono/WISDOM uptake scan was recommended. Also seen by pulmonary. VQ scan neg for PE.     8/9: pt seen and examined. Feels well. NO sob at present. No chest pain. No f/c/r. No n/v/d. Tolerating PO.     Review of system- All 10 systems reviewed and is as per HPI otherwise negative.     Vital Signs Last 24 Hrs  T(C): 36.7 (09 Aug 2017 09:52), Max: 36.9 (08 Aug 2017 21:06)  T(F): 98.1 (09 Aug 2017 09:52), Max: 98.5 (09 Aug 2017 05:07)  HR: 906 (09 Aug 2017 09:52) (87 - 906)  BP: 140/78 (09 Aug 2017 09:52) (140/78 - 161/80)  RR: 17 (09 Aug 2017 09:52) (16 - 17)  SpO2: 100% (09 Aug 2017 09:52) (98% - 100%)    PHYSICAL EXAM:  GENERAL: Comfortable, no acute distress  HEAD:  Atraumatic, Normocephalic  EYES: EOMI, PERRLA  HEENT: Moist mucous membranes  NECK: Supple, No JVD  NERVOUS SYSTEM:  Alert & Oriented X3, Motor Strength 5/5 B/L upper and lower extremities  CHEST/LUNG: Clear to auscultation bilaterally, decreased bs at bases.  HEART: s1, s2+, Regular rate and rhythm;   ABDOMEN: Soft, Nontender, Nondistended; Bowel sounds present  GENITOURINARY- Voiding, no palpable bladder  EXTREMITIES:  +LUE lymphedema++  MUSCULOSKELTAL- No muscle tenderness, No joint tenderness  SKIN-no rash      LABS:                        8.6    6.1   )-----------( 170      ( 07 Aug 2017 21:35 )             25.0     08-08    140  |  108  |  31<H>  ----------------------------<  102<H>  4.2   |  23  |  1.86<H>    Ca    8.1<L>      08 Aug 2017 05:59      CAPILLARY BLOOD GLUCOSE  102 (09 Aug 2017 07:47)  153 (08 Aug 2017 21:06)  121 (08 Aug 2017 16:47)    MEDS  aluminum hydroxide/magnesium hydroxide/simethicone Suspension 30 milliLiter(s) Oral every 4 hours PRN  sodium chloride 0.9% lock flush 3 milliLiter(s) IV Push every 8 hours  insulin lispro (HumaLOG) corrective regimen sliding scale   SubCutaneous three times a day before meals  dextrose 5%. 1000 milliLiter(s) IV Continuous <Continuous>  dextrose Gel 1 Dose(s) Oral once PRN  dextrose 50% Injectable 12.5 Gram(s) IV Push once  dextrose 50% Injectable 25 Gram(s) IV Push once  glucagon  Injectable 1 milliGRAM(s) IntraMuscular once PRN  ammonium lactate 12% Lotion 1 Application(s) Topical two times a day  enoxaparin Injectable 75 milliGRAM(s) SubCutaneous every 12 hours  furosemide   Injectable 40 milliGRAM(s) IV Push daily  simvastatin 10 milliGRAM(s) Oral at bedtime  amLODIPine   Tablet 5 milliGRAM(s) Oral at bedtime  calcium carbonate  625 mG + Vitamin D (OsCal 250 + D) 1 Tablet(s) Oral two times a day  pantoprazole  Injectable 40 milliGRAM(s) IV Push two times a day  hydrALAZINE Injectable 10 milliGRAM(s) IV Push every 8 hours PRN    66F, pmh as above a/w:    1. ARANA:  -likely due to acute diastolic CHF exacerbation  -VQ scan neg for PE>lovenox dc'd.  -2Decho/MUGA with preserved LVEF  -CXR initially with small effusions, repeat cxr improved with iv lasix    2. H/o Breast ca s/p lumpectomies/ln dissection/chemo/RT:  -follows with Dr. Parker @ [facility] in [place].  -PET scan results obtained and placed in chart    3. Hyperthyroidism:  -pt had multiple biopsies in past which were neg for cancer per patient  -was on "thyroid medicine" which she no longer takes.  -endocrine eval appreciated  -sono with multiple nodules.    -WISDOM Thurs/fri    4. HTN:  -on hydralazine for now   -off ace due to iza    5. HLD:  -on statin    6. DMII:  -sliding scale.    7. IZA:  -seems to be improving.  -last creatinine 7/31 2.5    8. Dispo:  -can dc tele  -dc planning for friday.    d/w patient/daughter over telephone
c/c: sob/ARANA    HPI: 66F, pmh of HTN, HLD, DMII, endometrial ca s/p vicky/bso, carpal tunnel, nodular goiter,  breast ca left breast with metastatic recurrence, right breast ca (diff pathology), C2 fusion for pathologic fx, Ln dissections, chemo (now taking a break), pleural effusions s/p thoracentesis with neg cytology,  was scheduled for CT with iv contrast but renal function was noted to be abnormal (creat 2.5 ).  Was taken off lasix/and ace-i since 9 days pta. She presented to the ER with worsening sob since stopping her lasix. Worse with exertion and laying flat. Also has chronic LUE lymphedema, but noticed it was worse than usual. Also had LE edema which improved with elevation. In ed, CXR showed very small pleural effusions bilaterally w baseline atelectasis.  D-dimers were elevated Dopplers were neg for DVT.  Due to elevated Cr she could not have a CTA so lovenox was administered and she was admitted. She underwent a 2Decho which showed preserved LVEF. MUGA showed good LV function as well. SHe was noted to have Low TSH with elevated T3/T4. Seen by endocrine and sono/WISDOM uptake scan was recommended. Also seen by pulmonary and VQ scan ordered.     8/8: pt seen and examined this am. Was feeling well. SOB imrpoved. No chest pain.     Review of system- All 10 systems reviewed and is as per HPI otherwise negative.     Vital Signs Last 24 Hrs  T(C): 36.7 (08 Aug 2017 10:24), Max: 37.1 (08 Aug 2017 04:07)  T(F): 98 (08 Aug 2017 10:24), Max: 98.8 (08 Aug 2017 04:07)  HR: 94 (08 Aug 2017 10:24) (89 - 102)  BP: 137/51 (08 Aug 2017 10:24) (137/51 - 158/85)  RR: 18 (08 Aug 2017 10:24) (16 - 18)  SpO2: 95% (08 Aug 2017 10:24) (94% - 100%)    PHYSICAL EXAM:    GENERAL: Comfortable, no acute distress  HEAD:  Atraumatic, Normocephalic  EYES: EOMI, PERRLA  HEENT: Moist mucous membranes  NECK: Supple, No JVD  NERVOUS SYSTEM:  Alert & Oriented X3, Motor Strength 5/5 B/L upper and lower extremities  CHEST/LUNG: Clear to auscultation bilaterally, decreased bs at bases.  HEART: s1, s2+, Regular rate and rhythm;   ABDOMEN: Soft, Nontender, Nondistended; Bowel sounds present  GENITOURINARY- Voiding, no palpable bladder  EXTREMITIES:  +LUE lymphedema++  MUSCULOSKELTAL- No muscle tenderness, No joint tenderness  SKIN-no rash      LABS:                        8.6    6.1   )-----------( 170      ( 07 Aug 2017 21:35 )             25.0     08-08    140  |  108  |  31<H>  ----------------------------<  102<H>  4.2   |  23  |  1.86<H>    Ca    8.1<L>      08 Aug 2017 05:59  Phos  2.9     08-07  Mg     2.1     08-07    CAPILLARY BLOOD GLUCOSE  171 (08 Aug 2017 11:05)  93 (08 Aug 2017 07:57)  120 (07 Aug 2017 22:51)  88 (07 Aug 2017 17:13)          MEDS  aluminum hydroxide/magnesium hydroxide/simethicone Suspension 30 milliLiter(s) Oral every 4 hours PRN  sodium chloride 0.9% lock flush 3 milliLiter(s) IV Push every 8 hours  insulin lispro (HumaLOG) corrective regimen sliding scale   SubCutaneous three times a day before meals  dextrose 5%. 1000 milliLiter(s) IV Continuous <Continuous>  dextrose Gel 1 Dose(s) Oral once PRN  dextrose 50% Injectable 12.5 Gram(s) IV Push once  dextrose 50% Injectable 25 Gram(s) IV Push once  glucagon  Injectable 1 milliGRAM(s) IntraMuscular once PRN  ammonium lactate 12% Lotion 1 Application(s) Topical two times a day  enoxaparin Injectable 75 milliGRAM(s) SubCutaneous every 12 hours  furosemide   Injectable 40 milliGRAM(s) IV Push daily  simvastatin 10 milliGRAM(s) Oral at bedtime  amLODIPine   Tablet 5 milliGRAM(s) Oral at bedtime  calcium carbonate  625 mG + Vitamin D (OsCal 250 + D) 1 Tablet(s) Oral two times a day  pantoprazole  Injectable 40 milliGRAM(s) IV Push two times a day  hydrALAZINE Injectable 10 milliGRAM(s) IV Push every 8 hours PRN    66F, pmh as above a/w:    1. ARANA:  -possibly related to pleural effusions which have improved.   -2Decho/MUGA with preserved LVEF  -On lovenox for possible PE  -pulm eval appreciated, VQ scan pending (to be done tomorrow as pt had MUGA yesterday)    2. H/o Breast ca s/p lumpectomies/ln dissection/chemo/RT:  -follows with Dr. Parker @ [facility] in [place].  -PET scan results obtained and placed in chart    3. Hyperthyroidism:  -pt had multiple biopsies in past which were neg for cancer per patient  -was on "thyroid medicine" which she no longer takes.  -endocrine eval appreciated  -check sono.   -WISDOM Thurs/fri    3. HTN:  -on hydralazine for now   -off ace due to iza    4. HLD:  -on statin    5. DMII:  -sliding scale.    6. IZA:  -seems to be improving.  -last creatinine 7/31 2.5

## 2017-08-10 NOTE — DISCHARGE NOTE ADULT - PATIENT PORTAL LINK FT
“You can access the [employer] Patient Portal, offered by [facility], by registering with the following website: [email or web address]”

## 2017-08-10 NOTE — PROGRESS NOTE ADULT - ASSESSMENT
sob likely with chf with diastolic dysfunction and less likely  PE . pleural effusions are small .   metastatic breast cancer   - anemia   - ckd     PLAN     - Continue diuresis for now . would obtain vq scan to complete PE work and if negative anticoagulation can be discontinued   - cardio follow up   - case discussed with the dr wall   - patient does complines of dark stools and consider checking the stool for the guiac . anemia is multifactorial with chemotherapy and ckd and rule out G.I bleed .
sob likely with chf with diastolic dysfunction and less likely  PE with some what improving effusions while on lasix   metastatic breast cancer   - anemia   - ckd     PLAN     - follow up vq scan results requested and if negative full anticoagulation can be discontinued   - cardio follow up   - change of iv diuretics to po at discharge   - management of other medical issues as per the medicine   - oncology follow up for the metastatic cancer   - follow up hb and renal function
sob likely with chf with diastolic dysfunction with negative work up for the P.E with normal vq scan.  metastatic breast cancer   - anemia   - ckd     PLAN     - change of iv lasix and protonix to po   - follow up electrolytes   - endocrines follow up and patient was started on methimazole   - pulmo status is stable for discharge with improvement in clinical symptoms . regular dvt prophylaxis untill discharged

## 2017-08-10 NOTE — DISCHARGE NOTE ADULT - PLAN OF CARE
prevent recurrence take lasix as prescribed and follow up with primary care physician/cardiology for further dose titration.  You will have to have your kidney function monitored (with blood work) by your primary care physician and referral made to nephrology if necessary. treatment take methimazole as prescribed and follow up with endocrinology in 2 weeks. follow up with oncology as outpatient. treatment if necessary stop metformin as your kidney function is abnormal  check sugars daily, if persistently elevated, call endocrinology to start new medicine for diabetes.

## 2017-08-14 DIAGNOSIS — E05.90 THYROTOXICOSIS, UNSPECIFIED WITHOUT THYROTOXIC CRISIS OR STORM: ICD-10-CM

## 2017-08-14 DIAGNOSIS — I13.0 HYPERTENSIVE HEART AND CHRONIC KIDNEY DISEASE WITH HEART FAILURE AND STAGE 1 THROUGH STAGE 4 CHRONIC KIDNEY DISEASE, OR UNSPECIFIED CHRONIC KIDNEY DISEASE: ICD-10-CM

## 2017-08-14 DIAGNOSIS — E78.5 HYPERLIPIDEMIA, UNSPECIFIED: ICD-10-CM

## 2017-08-14 DIAGNOSIS — Z85.3 PERSONAL HISTORY OF MALIGNANT NEOPLASM OF BREAST: ICD-10-CM

## 2017-08-14 DIAGNOSIS — D64.81 ANEMIA DUE TO ANTINEOPLASTIC CHEMOTHERAPY: ICD-10-CM

## 2017-08-14 DIAGNOSIS — I50.33 ACUTE ON CHRONIC DIASTOLIC (CONGESTIVE) HEART FAILURE: ICD-10-CM

## 2017-08-14 DIAGNOSIS — E43 UNSPECIFIED SEVERE PROTEIN-CALORIE MALNUTRITION: ICD-10-CM

## 2017-08-14 DIAGNOSIS — R06.02 SHORTNESS OF BREATH: ICD-10-CM

## 2017-08-14 DIAGNOSIS — N18.4 CHRONIC KIDNEY DISEASE, STAGE 4 (SEVERE): ICD-10-CM

## 2017-08-14 DIAGNOSIS — Z85.42 PERSONAL HISTORY OF MALIGNANT NEOPLASM OF OTHER PARTS OF UTERUS: ICD-10-CM

## 2017-08-14 DIAGNOSIS — C79.51 SECONDARY MALIGNANT NEOPLASM OF BONE: ICD-10-CM

## 2017-08-14 DIAGNOSIS — E11.9 TYPE 2 DIABETES MELLITUS WITHOUT COMPLICATIONS: ICD-10-CM

## 2017-08-14 DIAGNOSIS — Z91.040 LATEX ALLERGY STATUS: ICD-10-CM

## 2017-08-14 DIAGNOSIS — Z92.21 PERSONAL HISTORY OF ANTINEOPLASTIC CHEMOTHERAPY: ICD-10-CM

## 2017-09-13 ENCOUNTER — INPATIENT (INPATIENT)
Facility: HOSPITAL | Age: 67
LOS: 6 days | Discharge: ANOTHER HOSPITAL | End: 2017-09-20
Attending: INTERNAL MEDICINE | Admitting: INTERNAL MEDICINE
Payer: MEDICARE

## 2017-09-13 VITALS — HEIGHT: 64 IN | WEIGHT: 164.91 LBS

## 2017-09-13 PROBLEM — J90 PLEURAL EFFUSION, NOT ELSEWHERE CLASSIFIED: Chronic | Status: ACTIVE | Noted: 2017-08-05

## 2017-09-13 LAB
ALBUMIN SERPL ELPH-MCNC: 2.8 G/DL — LOW (ref 3.3–5)
ALP SERPL-CCNC: 74 U/L — SIGNIFICANT CHANGE UP (ref 40–120)
ALT FLD-CCNC: 13 U/L — SIGNIFICANT CHANGE UP (ref 12–78)
ANION GAP SERPL CALC-SCNC: 8 MMOL/L — SIGNIFICANT CHANGE UP (ref 5–17)
ANISOCYTOSIS BLD QL: SIGNIFICANT CHANGE UP
APPEARANCE UR: CLEAR — SIGNIFICANT CHANGE UP
AST SERPL-CCNC: 18 U/L — SIGNIFICANT CHANGE UP (ref 15–37)
BACTERIA # UR AUTO: (no result)
BASOPHILS # BLD AUTO: 0 K/UL — SIGNIFICANT CHANGE UP (ref 0–0.2)
BASOPHILS NFR BLD AUTO: 0.5 % — SIGNIFICANT CHANGE UP (ref 0–2)
BILIRUB SERPL-MCNC: 0.4 MG/DL — SIGNIFICANT CHANGE UP (ref 0.2–1.2)
BILIRUB UR-MCNC: NEGATIVE — SIGNIFICANT CHANGE UP
BLD GP AB SCN SERPL QL: SIGNIFICANT CHANGE UP
BUN SERPL-MCNC: 30 MG/DL — HIGH (ref 7–23)
CALCIUM SERPL-MCNC: 8 MG/DL — LOW (ref 8.5–10.1)
CHLORIDE SERPL-SCNC: 104 MMOL/L — SIGNIFICANT CHANGE UP (ref 96–108)
CO2 SERPL-SCNC: 24 MMOL/L — SIGNIFICANT CHANGE UP (ref 22–31)
COLOR SPEC: YELLOW — SIGNIFICANT CHANGE UP
CREAT SERPL-MCNC: 1.96 MG/DL — HIGH (ref 0.5–1.3)
DACRYOCYTES BLD QL SMEAR: SLIGHT — SIGNIFICANT CHANGE UP
DIFF PNL FLD: (no result)
EOSINOPHIL # BLD AUTO: 0.2 K/UL — SIGNIFICANT CHANGE UP (ref 0–0.5)
EOSINOPHIL NFR BLD AUTO: 3.6 % — SIGNIFICANT CHANGE UP (ref 0–6)
EPI CELLS # UR: SIGNIFICANT CHANGE UP
GLUCOSE SERPL-MCNC: 103 MG/DL — HIGH (ref 70–99)
GLUCOSE UR QL: NEGATIVE MG/DL — SIGNIFICANT CHANGE UP
HCT VFR BLD CALC: 18.8 % — CRITICAL LOW (ref 34.5–45)
HGB BLD-MCNC: 6.2 G/DL — CRITICAL LOW (ref 11.5–15.5)
HYALINE CASTS # UR AUTO: (no result) /LPF
HYPOCHROMIA BLD QL: SIGNIFICANT CHANGE UP
KETONES UR-MCNC: NEGATIVE — SIGNIFICANT CHANGE UP
LEUKOCYTE ESTERASE UR-ACNC: (no result)
LIDOCAIN IGE QN: 195 U/L — SIGNIFICANT CHANGE UP (ref 73–393)
LYMPHOCYTES # BLD AUTO: 1.2 K/UL — SIGNIFICANT CHANGE UP (ref 1–3.3)
LYMPHOCYTES # BLD AUTO: 17.4 % — SIGNIFICANT CHANGE UP (ref 13–44)
MACROCYTES BLD QL: SLIGHT — SIGNIFICANT CHANGE UP
MANUAL DIF COMMENT BLD-IMP: SIGNIFICANT CHANGE UP
MCHC RBC-ENTMCNC: 25.6 PG — LOW (ref 27–34)
MCHC RBC-ENTMCNC: 32.9 GM/DL — SIGNIFICANT CHANGE UP (ref 32–36)
MCV RBC AUTO: 78 FL — LOW (ref 80–100)
MICROCYTES BLD QL: SIGNIFICANT CHANGE UP
MONOCYTES # BLD AUTO: 0.5 K/UL — SIGNIFICANT CHANGE UP (ref 0–0.9)
MONOCYTES NFR BLD AUTO: 6.7 % — SIGNIFICANT CHANGE UP (ref 2–14)
NEUTROPHILS # BLD AUTO: 4.9 K/UL — SIGNIFICANT CHANGE UP (ref 1.8–7.4)
NEUTROPHILS NFR BLD AUTO: 71.7 % — SIGNIFICANT CHANGE UP (ref 43–77)
NITRITE UR-MCNC: NEGATIVE — SIGNIFICANT CHANGE UP
NT-PROBNP SERPL-SCNC: 5723 PG/ML — HIGH (ref 0–125)
OVALOCYTES BLD QL SMEAR: SLIGHT — SIGNIFICANT CHANGE UP
PH UR: 7 — SIGNIFICANT CHANGE UP (ref 5–8)
PLAT MORPH BLD: NORMAL — SIGNIFICANT CHANGE UP
PLATELET # BLD AUTO: 276 K/UL — SIGNIFICANT CHANGE UP (ref 150–400)
POIKILOCYTOSIS BLD QL AUTO: SLIGHT — SIGNIFICANT CHANGE UP
POLYCHROMASIA BLD QL SMEAR: SLIGHT — SIGNIFICANT CHANGE UP
POTASSIUM SERPL-MCNC: 4.6 MMOL/L — SIGNIFICANT CHANGE UP (ref 3.5–5.3)
POTASSIUM SERPL-SCNC: 4.6 MMOL/L — SIGNIFICANT CHANGE UP (ref 3.5–5.3)
PROT SERPL-MCNC: 6.1 GM/DL — SIGNIFICANT CHANGE UP (ref 6–8.3)
PROT UR-MCNC: 30 MG/DL
RBC # BLD: 2.42 M/UL — LOW (ref 3.8–5.2)
RBC # FLD: 14.6 % — HIGH (ref 10.3–14.5)
RBC BLD AUTO: (no result)
RBC CASTS # UR COMP ASSIST: (no result) /HPF (ref 0–4)
SODIUM SERPL-SCNC: 136 MMOL/L — SIGNIFICANT CHANGE UP (ref 135–145)
SP GR SPEC: 1 — LOW (ref 1.01–1.02)
TARGETS BLD QL SMEAR: SLIGHT — SIGNIFICANT CHANGE UP
TROPONIN I SERPL-MCNC: <0.015 NG/ML — SIGNIFICANT CHANGE UP (ref 0.01–0.04)
TYPE + AB SCN PNL BLD: SIGNIFICANT CHANGE UP
UROBILINOGEN FLD QL: NEGATIVE MG/DL — SIGNIFICANT CHANGE UP
WBC # BLD: 6.8 K/UL — SIGNIFICANT CHANGE UP (ref 3.8–10.5)
WBC # FLD AUTO: 6.8 K/UL — SIGNIFICANT CHANGE UP (ref 3.8–10.5)
WBC UR QL: SIGNIFICANT CHANGE UP

## 2017-09-13 PROCEDURE — 99285 EMERGENCY DEPT VISIT HI MDM: CPT

## 2017-09-13 PROCEDURE — 71020: CPT | Mod: 26

## 2017-09-13 PROCEDURE — 74176 CT ABD & PELVIS W/O CONTRAST: CPT | Mod: 26

## 2017-09-13 RX ORDER — GLUCAGON INJECTION, SOLUTION 0.5 MG/.1ML
1 INJECTION, SOLUTION SUBCUTANEOUS ONCE
Qty: 0 | Refills: 0 | Status: DISCONTINUED | OUTPATIENT
Start: 2017-09-13 | End: 2017-09-20

## 2017-09-13 RX ORDER — SENNA PLUS 8.6 MG/1
2 TABLET ORAL AT BEDTIME
Qty: 0 | Refills: 0 | Status: DISCONTINUED | OUTPATIENT
Start: 2017-09-13 | End: 2017-09-20

## 2017-09-13 RX ORDER — PANTOPRAZOLE SODIUM 20 MG/1
40 TABLET, DELAYED RELEASE ORAL ONCE
Qty: 0 | Refills: 0 | Status: COMPLETED | OUTPATIENT
Start: 2017-09-13 | End: 2017-09-13

## 2017-09-13 RX ORDER — INSULIN LISPRO 100/ML
VIAL (ML) SUBCUTANEOUS EVERY 6 HOURS
Qty: 0 | Refills: 0 | Status: DISCONTINUED | OUTPATIENT
Start: 2017-09-13 | End: 2017-09-20

## 2017-09-13 RX ORDER — FUROSEMIDE 40 MG
40 TABLET ORAL DAILY
Qty: 0 | Refills: 0 | Status: DISCONTINUED | OUTPATIENT
Start: 2017-09-13 | End: 2017-09-20

## 2017-09-13 RX ORDER — DEXTROSE 50 % IN WATER 50 %
12.5 SYRINGE (ML) INTRAVENOUS ONCE
Qty: 0 | Refills: 0 | Status: DISCONTINUED | OUTPATIENT
Start: 2017-09-13 | End: 2017-09-20

## 2017-09-13 RX ORDER — ONDANSETRON 8 MG/1
4 TABLET, FILM COATED ORAL EVERY 6 HOURS
Qty: 0 | Refills: 0 | Status: DISCONTINUED | OUTPATIENT
Start: 2017-09-13 | End: 2017-09-20

## 2017-09-13 RX ORDER — DEXTROSE 50 % IN WATER 50 %
25 SYRINGE (ML) INTRAVENOUS ONCE
Qty: 0 | Refills: 0 | Status: DISCONTINUED | OUTPATIENT
Start: 2017-09-13 | End: 2017-09-20

## 2017-09-13 RX ORDER — OXYCODONE AND ACETAMINOPHEN 5; 325 MG/1; MG/1
1 TABLET ORAL ONCE
Qty: 0 | Refills: 0 | Status: DISCONTINUED | OUTPATIENT
Start: 2017-09-13 | End: 2017-09-13

## 2017-09-13 RX ORDER — SODIUM CHLORIDE 9 MG/ML
1000 INJECTION, SOLUTION INTRAVENOUS
Qty: 0 | Refills: 0 | Status: DISCONTINUED | OUTPATIENT
Start: 2017-09-13 | End: 2017-09-20

## 2017-09-13 RX ORDER — PANTOPRAZOLE SODIUM 20 MG/1
8 TABLET, DELAYED RELEASE ORAL
Qty: 80 | Refills: 0 | Status: DISCONTINUED | OUTPATIENT
Start: 2017-09-13 | End: 2017-09-17

## 2017-09-13 RX ORDER — DEXTROSE 50 % IN WATER 50 %
1 SYRINGE (ML) INTRAVENOUS ONCE
Qty: 0 | Refills: 0 | Status: DISCONTINUED | OUTPATIENT
Start: 2017-09-13 | End: 2017-09-20

## 2017-09-13 RX ORDER — SIMVASTATIN 20 MG/1
10 TABLET, FILM COATED ORAL AT BEDTIME
Qty: 0 | Refills: 0 | Status: DISCONTINUED | OUTPATIENT
Start: 2017-09-13 | End: 2017-09-20

## 2017-09-13 RX ORDER — ACETAMINOPHEN 500 MG
650 TABLET ORAL EVERY 6 HOURS
Qty: 0 | Refills: 0 | Status: DISCONTINUED | OUTPATIENT
Start: 2017-09-13 | End: 2017-09-20

## 2017-09-13 RX ORDER — ONDANSETRON 8 MG/1
4 TABLET, FILM COATED ORAL ONCE
Qty: 0 | Refills: 0 | Status: COMPLETED | OUTPATIENT
Start: 2017-09-13 | End: 2017-09-13

## 2017-09-13 RX ORDER — AMLODIPINE BESYLATE 2.5 MG/1
5 TABLET ORAL AT BEDTIME
Qty: 0 | Refills: 0 | Status: DISCONTINUED | OUTPATIENT
Start: 2017-09-13 | End: 2017-09-20

## 2017-09-13 RX ADMIN — OXYCODONE AND ACETAMINOPHEN 1 TABLET(S): 5; 325 TABLET ORAL at 21:19

## 2017-09-13 RX ADMIN — PANTOPRAZOLE SODIUM 10 MG/HR: 20 TABLET, DELAYED RELEASE ORAL at 18:11

## 2017-09-13 RX ADMIN — PANTOPRAZOLE SODIUM 40 MILLIGRAM(S): 20 TABLET, DELAYED RELEASE ORAL at 17:55

## 2017-09-13 RX ADMIN — ONDANSETRON 4 MILLIGRAM(S): 8 TABLET, FILM COATED ORAL at 15:51

## 2017-09-13 NOTE — ED PROVIDER NOTE - OBJECTIVE STATEMENT
65 y/o F with a PMHx of breast CA presents to the ED c/o worsening abd pain with episodes of NVD over the past few days. Pt states that she is unable to eat or keep any fluids down as she vomits. Pt states that she was admitted to hospital in the past for pleural effusion. Pt currently calm, not experiencing pain now and denies fever, cough, chills or any other acute c/o at this time. Pt requesting nausea medication.

## 2017-09-13 NOTE — ED PROVIDER NOTE - NS_ ATTENDINGSCRIBEDETAILS _ED_A_ED_FT
I, Renzo Magallanes, performed the initial face to face bedside interview with this patient regarding history of present illness, review of symptoms and relevant past medical, social and family history.  I completed an independent physical examination.  I was the initial provider who evaluated this patient.  The history, relevant review of systems, past medical and surgical history, medical decision making, and physical examination was documented by the scribe in my presence and I attest to the accuracy of the documentation.

## 2017-09-13 NOTE — H&P ADULT - NSHPPHYSICALEXAM_GEN_ALL_CORE
Vital Signs Last 24 Hrs  T(C): 36.9 (13 Sep 2017 20:41), Max: 36.9 (13 Sep 2017 20:05)  T(F): 98.4 (13 Sep 2017 20:41), Max: 98.4 (13 Sep 2017 20:05)  HR: 85 (13 Sep 2017 20:41) (85 - 89)  BP: 144/80 (13 Sep 2017 20:41) (144/80 - 148/80)  BP(mean): 101 (13 Sep 2017 20:05) (101 - 101)  RR: 20 (13 Sep 2017 20:41) (17 - 20)  SpO2: 97% (13 Sep 2017 20:41) (97% - 100%)    GEN: appears comfortable  Neuro: AAOx3, nonfocal  HEENT: NC/AT, EOMI  Neck: no thyroidmegaly, no JVD  Cardiovascular: S1S2 present, regular rhythm, II/VI systolic murmur  Respiratory: breath sounds decreased on right, no wheezing, no rales, no rhonchi  Gastrointestinal: bowel sounds normal, soft, +epigastric abdominal tenderness  Musculoskeletal: no muscle tenderness  Extremities: +pitting edema bilaterally, LUE swelling (unchanged per pt)  Skin: No rash

## 2017-09-13 NOTE — ED PROVIDER NOTE - MEDICAL DECISION MAKING DETAILS
66yr old female w/ metastatic breast cancer p/w epigastric discomfort.  Gastritis?  Labs and CT to be performed.  Las show anemia.  Blood transfusion.  +occult blood.  Protonix w/ gtt.    CT done.  Discussed gastritis, pleural effusion, and fluid in pelvis w/ pt and daughter.  Admit.

## 2017-09-13 NOTE — ED STATDOCS - PROGRESS NOTE DETAILS
67 y/o female with PMHx of pleural effusion, breast CA, metastatic to cervical spine, not currently on chemo, presents to the ED c/o burning abdominal pain.  Daughter states she has been retaining fluid in lung.   +SOB +Vomiting.  She is not able to keep down any food.  Denies back pain, blood in stool, CP.  She has some relief of symptoms when she sits upright.  Will transfer to main ED for further evaluation.

## 2017-09-13 NOTE — H&P ADULT - HISTORY OF PRESENT ILLNESS
66 y.o. female with PMH breast ca s/p lumpectomies, LN dissections on chemo with L lymphedema, C2 fusion for pathologic fx, diastolic CHF, HTN, HLD, DM2, hx R pleural effusion, hx benign thyroid nodules, hyperthyroidism presents with abdominal pain. Pt states the abdominal pain started 2 weeks ago, and every time pt eats, she vomits. Pt states decreased po intake. Denies fever, chills, CP, palpitations, SOB. The abd pain is epigastric and L and R upper abdominal pain, improves with pain med and PPI. States burning sensation. Pt has LH and dizziness with change in positions. Pt denies blood in stool, but does report "dark" stool. Pt last chemo few months ago.    Pt reports wanting to find oncologist that's local as she can't go to [place] anymore.    PMH: as above  PSMH: hysterectomy, b/l lumpectomies with LN dissections, cholecystectomy, thoracentesis, mediport placement  Family Hx: denies ca hx  Social Hx: denies tobacco, EtOH, drugs  ROS: per HPI

## 2017-09-13 NOTE — ED PROVIDER NOTE - PROGRESS NOTE DETAILS
Guaiac positive, brown stool. Lot no. [id number] QC pass. Pt given written consent for blood product. I, Nereyda Rico am scribing for and in the presence of Dr. Magallanes

## 2017-09-13 NOTE — ED PROVIDER NOTE - CARE PLAN
Principal Discharge DX:	Anemia  Secondary Diagnosis:	Pleural effusion  Secondary Diagnosis:	Gastritis

## 2017-09-13 NOTE — ED PROVIDER NOTE - SKIN, MLM
Skin normal color for race, warm, dry and intact. No evidence of rash. old RUQ scar, scar to back of neck.

## 2017-09-13 NOTE — ED ADULT NURSE REASSESSMENT NOTE - NS ED NURSE REASSESS COMMENT FT1
protonix gtt continues, 2nd blood transfusion started, report given to [address] [place], pt denies sob, cp, pain, weakness. pt assisted to bathroom at 2330. belongings with pt on stretcher, vss.

## 2017-09-14 LAB
ANION GAP SERPL CALC-SCNC: 9 MMOL/L — SIGNIFICANT CHANGE UP (ref 5–17)
BASOPHILS # BLD AUTO: 0.1 K/UL — SIGNIFICANT CHANGE UP (ref 0–0.2)
BASOPHILS NFR BLD AUTO: 0.8 % — SIGNIFICANT CHANGE UP (ref 0–2)
BUN SERPL-MCNC: 25 MG/DL — HIGH (ref 7–23)
CALCIUM SERPL-MCNC: 7.7 MG/DL — LOW (ref 8.5–10.1)
CHLORIDE SERPL-SCNC: 105 MMOL/L — SIGNIFICANT CHANGE UP (ref 96–108)
CHOLEST SERPL-MCNC: 89 MG/DL — SIGNIFICANT CHANGE UP (ref 10–199)
CO2 SERPL-SCNC: 22 MMOL/L — SIGNIFICANT CHANGE UP (ref 22–31)
CREAT SERPL-MCNC: 1.76 MG/DL — HIGH (ref 0.5–1.3)
CULTURE RESULTS: SIGNIFICANT CHANGE UP
EOSINOPHIL # BLD AUTO: 0.8 K/UL — HIGH (ref 0–0.5)
EOSINOPHIL NFR BLD AUTO: 8.9 % — HIGH (ref 0–6)
GLUCOSE SERPL-MCNC: 82 MG/DL — SIGNIFICANT CHANGE UP (ref 70–99)
HCT VFR BLD CALC: 25.1 % — LOW (ref 34.5–45)
HCT VFR BLD CALC: 25.8 % — LOW (ref 34.5–45)
HCT VFR BLD CALC: 26.9 % — LOW (ref 34.5–45)
HDLC SERPL-MCNC: 36 MG/DL — LOW (ref 40–125)
HGB BLD-MCNC: 8.2 G/DL — LOW (ref 11.5–15.5)
HGB BLD-MCNC: 8.4 G/DL — LOW (ref 11.5–15.5)
HGB BLD-MCNC: 9 G/DL — LOW (ref 11.5–15.5)
LIPID PNL WITH DIRECT LDL SERPL: 34 MG/DL — SIGNIFICANT CHANGE UP
LYMPHOCYTES # BLD AUTO: 0.9 K/UL — LOW (ref 1–3.3)
LYMPHOCYTES # BLD AUTO: 9.7 % — LOW (ref 13–44)
MCHC RBC-ENTMCNC: 26.6 PG — LOW (ref 27–34)
MCHC RBC-ENTMCNC: 32.9 GM/DL — SIGNIFICANT CHANGE UP (ref 32–36)
MCV RBC AUTO: 80.8 FL — SIGNIFICANT CHANGE UP (ref 80–100)
MONOCYTES # BLD AUTO: 0.9 K/UL — SIGNIFICANT CHANGE UP (ref 0–0.9)
MONOCYTES NFR BLD AUTO: 10.1 % — SIGNIFICANT CHANGE UP (ref 2–14)
NEUTROPHILS # BLD AUTO: 6.4 K/UL — SIGNIFICANT CHANGE UP (ref 1.8–7.4)
NEUTROPHILS NFR BLD AUTO: 70.5 % — SIGNIFICANT CHANGE UP (ref 43–77)
PLATELET # BLD AUTO: 229 K/UL — SIGNIFICANT CHANGE UP (ref 150–400)
POTASSIUM SERPL-MCNC: 4.3 MMOL/L — SIGNIFICANT CHANGE UP (ref 3.5–5.3)
POTASSIUM SERPL-SCNC: 4.3 MMOL/L — SIGNIFICANT CHANGE UP (ref 3.5–5.3)
RBC # BLD: 3.1 M/UL — LOW (ref 3.8–5.2)
RBC # FLD: 14.9 % — HIGH (ref 10.3–14.5)
SODIUM SERPL-SCNC: 136 MMOL/L — SIGNIFICANT CHANGE UP (ref 135–145)
SPECIMEN SOURCE: SIGNIFICANT CHANGE UP
TOTAL CHOLESTEROL/HDL RATIO MEASUREMENT: 2.5 RATIO — LOW (ref 3.3–7.1)
TRIGL SERPL-MCNC: 93 MG/DL — SIGNIFICANT CHANGE UP (ref 10–149)
WBC # BLD: 9.1 K/UL — SIGNIFICANT CHANGE UP (ref 3.8–10.5)
WBC # FLD AUTO: 9.1 K/UL — SIGNIFICANT CHANGE UP (ref 3.8–10.5)

## 2017-09-14 PROCEDURE — 93010 ELECTROCARDIOGRAM REPORT: CPT

## 2017-09-14 RX ORDER — OXYCODONE AND ACETAMINOPHEN 5; 325 MG/1; MG/1
1 TABLET ORAL ONCE
Qty: 0 | Refills: 0 | Status: DISCONTINUED | OUTPATIENT
Start: 2017-09-14 | End: 2017-09-14

## 2017-09-14 RX ORDER — INFLUENZA VIRUS VACCINE 15; 15; 15; 15 UG/.5ML; UG/.5ML; UG/.5ML; UG/.5ML
0.5 SUSPENSION INTRAMUSCULAR ONCE
Qty: 0 | Refills: 0 | Status: COMPLETED | OUTPATIENT
Start: 2017-09-14 | End: 2017-09-14

## 2017-09-14 RX ADMIN — OXYCODONE AND ACETAMINOPHEN 1 TABLET(S): 5; 325 TABLET ORAL at 05:53

## 2017-09-14 RX ADMIN — PANTOPRAZOLE SODIUM 10 MG/HR: 20 TABLET, DELAYED RELEASE ORAL at 13:26

## 2017-09-14 RX ADMIN — Medication 1 TABLET(S): at 17:24

## 2017-09-14 RX ADMIN — Medication 1 TABLET(S): at 05:23

## 2017-09-14 RX ADMIN — Medication 40 MILLIGRAM(S): at 05:23

## 2017-09-14 RX ADMIN — SIMVASTATIN 10 MILLIGRAM(S): 20 TABLET, FILM COATED ORAL at 21:52

## 2017-09-14 RX ADMIN — PANTOPRAZOLE SODIUM 10 MG/HR: 20 TABLET, DELAYED RELEASE ORAL at 22:44

## 2017-09-14 RX ADMIN — OXYCODONE AND ACETAMINOPHEN 1 TABLET(S): 5; 325 TABLET ORAL at 01:55

## 2017-09-14 RX ADMIN — ONDANSETRON 4 MILLIGRAM(S): 8 TABLET, FILM COATED ORAL at 22:50

## 2017-09-14 RX ADMIN — PANTOPRAZOLE SODIUM 10 MG/HR: 20 TABLET, DELAYED RELEASE ORAL at 03:53

## 2017-09-14 RX ADMIN — AMLODIPINE BESYLATE 5 MILLIGRAM(S): 2.5 TABLET ORAL at 21:52

## 2017-09-14 NOTE — CHART NOTE - NSCHARTNOTEFT_GEN_A_CORE
Upon Nutritional Assessment by the Registered Dietitian your patient was determined to meet criteria / has evidence of the following diagnosis/diagnoses:          [ ]  Mild Protein Calorie Malnutrition        [ ]  Moderate Protein Calorie Malnutrition        [ x] Severe Protein Calorie Malnutrition        [ ] Unspecified Protein Calorie Malnutrition        [ ] Underweight / BMI <19        [ ] Morbid Obesity / BMI > 40      Findings as based on:  •  Comprehensive nutrition assessment and consultation  •  Calorie counts (nutrient intake analysis)  •  Food acceptance and intake status from observations by staff  •  Follow up  •  Patient education  •  Intervention secondary to interdisciplinary rounds  •   concerns    Patient reports wt loss of 9% of UBW in past 6 months due to loss of appetite.   Pt meets criteria for severe protein-calorie malnutrition in context of chronic disease  with PO intake < 75% needs > one month, NFPE reveals moderate muscle wasting in clavicle, scapula, temporal area, thighs and calves, loss of 9% UBW in past 6 months.  Edema may be masking further wt loss.                     Treatment:    The following diet has been recommended:  Suggest advance diet to clear liquid to full liquid to mechanical soft, low fiber as medically feasible and as tolerated  Add Glucerna 8 oz tid  Consider DM, low fiber, mechanical soft diet once PO intake has improved        PROVIDER Section:     By signing this assessment you are acknowledging and agree with the diagnosis/diagnoses assigned by the Registered Dietitian    Comments:

## 2017-09-14 NOTE — CONSULT NOTE ADULT - SUBJECTIVE AND OBJECTIVE BOX
Patient is a 66y old  Female who presents with a chief complaint of abd pain, dizziness (13 Sep 2017 22:28)      HPI:  66 y.o. female with PMH breast ca s/p lumpectomies, LN dissections on chemo with L lymphedema, C2 fusion for pathologic fx, diastolic CHF, HTN, HLD, DM2, hx R pleural effusion, hx benign thyroid nodules, hyperthyroidism presents with abdominal pain. Pt states the abdominal pain started 2 weeks ago, and every time pt eats, she vomits. Pt states decreased po intake. Denies fever, chills, CP, palpitations, SOB. The abd pain is epigastric and L and R upper abdominal pain, improves with pain med and PPI. States burning sensation. Pt has LH and dizziness with change in positions. Pt denies blood in stool, but does report "dark" stool. Pt last chemo few months ago.    Pt reports wanting to find oncologist that's local as she can't go to [place] anymore.    abd pain mostly in upper, L more than rest, sharp and crampy, s radiation, pos vomitin brown fluid  neg CP or sob  pos fatigue  S/P PRBC with rise in HCT    PMH: as above  PSMH: hysterectomy, b/l lumpectomies with LN dissections, cholecystectomy, thoracentesis, mediport placement  Family Hx: denies ca hx  Social Hx: denies tobacco, EtOH, drugs  ROS: per HPI (13 Sep 2017 22:28)      PAST MEDICAL & SURGICAL HISTORY:  Pleural effusion  Breast CA      MEDICATIONS  (STANDING):  pantoprazole Infusion 8 mG/Hr (10 mL/Hr) IV Continuous <Continuous>  insulin lispro (HumaLOG) corrective regimen sliding scale   SubCutaneous every 6 hours  dextrose 5%. 1000 milliLiter(s) (50 mL/Hr) IV Continuous <Continuous>  dextrose 50% Injectable 12.5 Gram(s) IV Push once  dextrose 50% Injectable 25 Gram(s) IV Push once  dextrose 50% Injectable 25 Gram(s) IV Push once  simvastatin 10 milliGRAM(s) Oral at bedtime  methimazole 10 milliGRAM(s) Oral daily  amLODIPine   Tablet 5 milliGRAM(s) Oral at bedtime  calcium carbonate  625 mG + Vitamin D (OsCal 250 + D) 1 Tablet(s) Oral two times a day  furosemide    Tablet 40 milliGRAM(s) Oral daily  influenza   Vaccine 0.5 milliLiter(s) IntraMuscular once    MEDICATIONS  (PRN):  acetaminophen   Tablet 650 milliGRAM(s) Oral every 6 hours PRN For Temp greater than 38 C (100.4 F), mild pain  ondansetron Injectable 4 milliGRAM(s) IV Push every 6 hours PRN Nausea  senna 2 Tablet(s) Oral at bedtime PRN Constipation  dextrose Gel 1 Dose(s) Oral once PRN Blood Glucose LESS THAN 70 milliGRAM(s)/deciliter  glucagon  Injectable 1 milliGRAM(s) IntraMuscular once PRN Glucose LESS THAN 70 milligrams/deciliter      Allergies    latex (Unknown)  No Known Drug Allergies    Intolerances        SOCIAL HISTORY:non contributory, neg drugs    FAMILY HISTORY:NC      REVIEW OF SYSTEMS:    CONSTITUTIONAL: No weakness, fevers or chills  EYES/ENT: No visual changes;  No vertigo or throat pain   NECK: No pain or stiffness  RESPIRATORY: No cough, wheezing, hemoptysis; No shortness of breath  CARDIOVASCULAR: No chest pain or palpitations  GENITOURINARY: No dysuria, frequency or hematuria  NEUROLOGICAL: No numbness or weakness  SKIN: No itching, burning, rashes, or lesions   All other review of systems is negative unless indicated above.    Vital Signs Last 24 Hrs  T(C): 36.9 (14 Sep 2017 04:37), Max: 37.2 (14 Sep 2017 02:54)  T(F): 98.5 (14 Sep 2017 04:37), Max: 98.9 (14 Sep 2017 02:54)  HR: 93 (14 Sep 2017 04:37) (80 - 93)  BP: 142/69 (14 Sep 2017 04:37) (138/75 - 166/97)  BP(mean): 101 (13 Sep 2017 20:05) (101 - 101)  RR: 18 (14 Sep 2017 04:37) (16 - 21)  SpO2: 96% (14 Sep 2017 04:37) (96% - 100%)    PHYSICAL EXAM:    Constitutional: NAD, well-developed  HEENT: EOMI, throat clear  Neck: No LAD, supple  Respiratory: CTA and P  Cardiovascular: S1 and S2, RRR, no M  Gastrointestinal: BS+, soft, upper tenderness/ND, neg HSM,  Extremities: No peripheral edema, neg clubing, cyanosis  Vascular: 2+ peripheral pulses  Neurological: A/O x 3, no focal deficits  Psychiatric: Normal mood, normal affect  Skin: No rashes    LABS:  CBC Full  -  ( 14 Sep 2017 05:31 )  WBC Count : 9.1 K/uL  Hemoglobin : 8.2 g/dL  Hematocrit : 25.1 %  Platelet Count - Automated : 229 K/uL  Mean Cell Volume : 80.8 fl  Mean Cell Hemoglobin : 26.6 pg  Mean Cell Hemoglobin Concentration : 32.9 gm/dL  Auto Neutrophil # : 6.4 K/uL  Auto Lymphocyte # : 0.9 K/uL  Auto Monocyte # : 0.9 K/uL  Auto Eosinophil # : 0.8 K/uL  Auto Basophil # : 0.1 K/uL  Auto Neutrophil % : 70.5 %  Auto Lymphocyte % : 9.7 %  Auto Monocyte % : 10.1 %  Auto Eosinophil % : 8.9 %  Auto Basophil % : 0.8 %    09-14    136  |  105  |  25<H>  ----------------------------<  82  4.3   |  22  |  1.76<H>    Ca    7.7<L>      14 Sep 2017 05:31    TPro  6.1  /  Alb  2.8<L>  /  TBili  0.4  /  DBili  x   /  AST  18  /  ALT  13  /  AlkPhos  74  09-13            RADIOLOGY & ADDITIONAL STUDIES:  < from: CT Abdomen and Pelvis w/ Oral Cont (09.13.17 @ 18:41) >  EXAM:  CT ABDOMEN AND PELVIS OC                            PROCEDURE DATE:  09/13/2017          INTERPRETATION:      CT Abdomen and Pelvis without contrast        CLINICAL INFORMATION:     Abdominal pain and tenderness.    TECHNIQUE:  Contiguous axial 3 mm sections were obtained using single   helical acquisition through the abdomen and pelvis.       Oral contrast   was administered.  Intravenous contrast was withheld at the request of   the referring physician.  Imaging post processing software was employed   to generate reformatted images in 3 mm sagittal and coronal imaging   planes.   This scan was performed using automatic exposure control   (radiation dose reduction software) to obtain a diagnostic image quality   scan with patient dose as low as reasonably achievable.        FINDINGS:  CT dated 3/20/2015 available for review.    The lung bases are significant for a moderate RIGHT pleural effusion with   underlying atelectasis         The liver demonstrates homogeneous attenuationwith no focal lesion,   allowing for the noncontrast technique.  Hepatic size and contours are   maintained.  Hepatic and portal veins are not displaced.  No intrahepatic   or common ductal dilatation is recognized.  The gallbladder demonstrates   nocalcified calculi or wall thickening.  The pancreas is intact without   ductal dilatation or focal lesion.  The spleen is normal in size. Gastric   wall thickening suggestive of gastritis.    The adrenal glands are intact.  No renal calculi are seen.3 mm calculus   in the RIGHT pelvis  No perinephric infiltration is seen.  No   hydronephrosis is present.  No suspicious renal mass is recognized,   allowing for the noncontrast technique.  The ureters are nondilated.  No   calculi are recognized inthe course of either ureter.  The bladder   appears unremarkable.    Moderate free fluid is seen within the RIGHT posterior pelvis.    No enlarged lymph nodes are found.  No ascites is present.  The osseous   structures show degenerative changes of the spine.  Stable sclerotic   lesion involving the LEFT iliac bone.    The bowel appears unremarkable.  No obstruction, perforation or abscess   is recognized.           IMPRESSION:   Moderate free fluid is seen within the pelvis. Gastric wall   thickening suggestive of gastritis. Moderate RIGHT pleural effusion.                      < end of copied text >
HPI:  66 y.o. female with PMH breast ca s/p lumpectomies, LN dissections on chemo with L lymphedema, C2 fusion for pathologic fx, diastolic CHF, HTN, HLD, DM2, hx R pleural effusion, hx benign thyroid nodules, hyperthyroidism presents with abdominal pain. Pt states the abdominal pain started 2 weeks ago, and every time pt eats, she vomits. Pt states decreased po intake. Denies fever, chills, CP, palpitations, SOB. The abd pain is epigastric and L and R upper abdominal pain, improves with pain med and PPI. States burning sensation. Pt has LH and dizziness with change in positions. Pt denies blood in stool, but does report "dark" stool. Pt last chemo few months ago.    PMH: as above  PSMH: hysterectomy, b/l lumpectomies with LN dissections, cholecystectomy, thoracentesis, mediport placement  Family Hx: denies ca hx  Social Hx: denies tobacco, EtOH, drugs  ROS: per HPI (13 Sep 2017 22:28)    REVIEW OF SYSTEMS:    CONSTITUTIONAL: No weakness, fevers or chills  EYES/ENT: No visual changes;  No vertigo or throat pain   NECK: No pain or stiffness  RESPIRATORY: No cough, wheezing, hemoptysis; No shortness of breath  CARDIOVASCULAR: No chest pain or palpitations  GASTROINTESTINAL: No abdominal or epigastric pain. No nausea, vomiting, or hematemesis; No diarrhea or constipation. No melena or hematochezia.  GENITOURINARY: No dysuria, frequency or hematuria  NEUROLOGICAL: No numbness or weakness  SKIN: No itching, burning, rashes, or lesions   All other review of systems is negative unless indicated above    Vital Signs Last 24 Hrs  T(C): 36.9 (14 Sep 2017 04:37), Max: 37.2 (14 Sep 2017 02:54)  T(F): 98.5 (14 Sep 2017 04:37), Max: 98.9 (14 Sep 2017 02:54)  HR: 93 (14 Sep 2017 04:37) (80 - 93)  BP: 142/69 (14 Sep 2017 04:37) (138/75 - 166/97)  BP(mean): 101 (13 Sep 2017 20:05) (101 - 101)  RR: 18 (14 Sep 2017 04:37) (16 - 21)  SpO2: 96% (14 Sep 2017 04:37) (96% - 100%)    I&O's Summary    CAPILLARY BLOOD GLUCOSE  88 (14 Sep 2017 05:26)  87 (14 Sep 2017 01:35)    PHYSICAL EXAM:    Constitutional: NAD, awake and alert, well-developed  HEENT: PERR, EOMI, Normal Hearing, MMM  Neck: Soft and supple, No LAD, No JVD  Respiratory: Breath sounds are clear bilaterally, No wheezing, rales or rhonchi  Cardiovascular: S1 and S2, regular rate and rhythm, no Murmurs, gallops or rubs  Gastrointestinal: Bowel Sounds present, soft, nontender, nondistended, no guarding, no rebound  Extremities: No peripheral edema  Vascular: 2+ peripheral pulses  Neurological: A/O x 3, no focal deficits  Musculoskeletal: 5/5 strength b/l upper and lower extremities  Skin: No rashes    MEDICATIONS:  MEDICATIONS  (STANDING):  pantoprazole Infusion 8 mG/Hr (10 mL/Hr) IV Continuous <Continuous>  insulin lispro (HumaLOG) corrective regimen sliding scale   SubCutaneous every 6 hours  dextrose 5%. 1000 milliLiter(s) (50 mL/Hr) IV Continuous <Continuous>  dextrose 50% Injectable 12.5 Gram(s) IV Push once  dextrose 50% Injectable 25 Gram(s) IV Push once  dextrose 50% Injectable 25 Gram(s) IV Push once  simvastatin 10 milliGRAM(s) Oral at bedtime  methimazole 10 milliGRAM(s) Oral daily  amLODIPine   Tablet 5 milliGRAM(s) Oral at bedtime  calcium carbonate  625 mG + Vitamin D (OsCal 250 + D) 1 Tablet(s) Oral two times a day  furosemide    Tablet 40 milliGRAM(s) Oral daily  influenza   Vaccine 0.5 milliLiter(s) IntraMuscular once      LABS: All Labs Reviewed:                        8.2    9.1   )-----------( 229      ( 14 Sep 2017 05:31 )             25.1     09-14    136  |  105  |  25<H>  ----------------------------<  82  4.3   |  22  |  1.76<H>    Ca    7.7<L>      14 Sep 2017 05:31    TPro  6.1  /  Alb  2.8<L>  /  TBili  0.4  /  DBili  x   /  AST  18  /  ALT  13  /  AlkPhos  74  09-13      CARDIAC MARKERS ( 13 Sep 2017 15:34 )  <0.015 ng/mL / x     / x     / x     / x          Blood Culture:     RADIOLOGY/EKG:                     PROCEDURE DATE:  09/13/2017      INTERPRETATION:      CT Abdomen and Pelvis without contrast        CLINICAL INFORMATION:     Abdominal pain and tenderness.    TECHNIQUE:  Contiguous axial 3 mm sections were obtained using single   helical acquisition through the abdomen and pelvis.       Oral contrast   was administered.  Intravenous contrast was withheld at the request of   the referring physician.  Imaging post processing software was employed   to generate reformatted images in 3 mm sagittal and coronal imaging   planes.   This scan was performed using automatic exposure control   (radiation dose reduction software) to obtain a diagnostic image quality   scan with patient dose as low as reasonably achievable.        FINDINGS:  CT dated 3/20/2015 available for review.    The lung bases are significant for a moderate RIGHT pleural effusion with   underlying atelectasis         The liver demonstrates homogeneous attenuationwith no focal lesion,   allowing for the noncontrast technique.  Hepatic size and contours are   maintained.  Hepatic and portal veins are not displaced.  No intrahepatic   or common ductal dilatation is recognized.  The gallbladder demonstrates   nocalcified calculi or wall thickening.  The pancreas is intact without   ductal dilatation or focal lesion.  The spleen is normal in size. Gastric   wall thickening suggestive of gastritis.    The adrenal glands are intact.  No renal calculi are seen.3 mm calculus   in the RIGHT pelvis  No perinephric infiltration is seen.  No   hydronephrosis is present.  No suspicious renal mass is recognized,   allowing for the noncontrast technique.  The ureters are nondilated.  No   calculi are recognized inthe course of either ureter.  The bladder   appears unremarkable.    Moderate free fluid is seen within the RIGHT posterior pelvis.    No enlarged lymph nodes are found.  No ascites is present.  The osseous   structures show degenerative changes of the spine.  Stable sclerotic   lesion involving the LEFT iliac bone.    The bowel appears unremarkable.  No obstruction, perforation or abscess   is recognized.           IMPRESSION:   Moderate free fluid is seen within the pelvis. Gastric wall   thickening suggestive of gastritis. Moderate RIGHT pleural effusion.
NEPHROLOGY CONSULT  HPI:  66 y.o. female with PMH breast ca s/p lumpectomies, LN dissections on chemo with L lymphedema, C2 fusion for pathologic fx, diastolic CHF, HTN, HLD, DM2, hx R pleural effusion, hx benign thyroid nodules, hyperthyroidism presents with abdominal pain. Pt states the abdominal pain started 2 weeks ago, and every time pt eats, she vomits. Pt states decreased po intake. Denies fever, chills, CP, palpitations, SOB. The abd pain is epigastric and L and R upper abdominal pain, improves with pain med and PPI. States burning sensation. Pt has LH and dizziness with change in positions. Pt denies blood in stool, but does report "dark" stool. Pt last chemo few months ago.  Pt reports wanting to find oncologist that's local as she can't go to [facility] anymore.  Pt was admitted in August with elevated creat 1.8 -1.9 range  Never evaluated by Nephrology before..  Is aware of the fact that has renal issues, but not the etiology      PMH: as above  PSMH: hysterectomy, b/l lumpectomies with LN dissections, cholecystectomy, thoracentesis, mediport placement  Family Hx: denies ca hx  Social Hx: denies tobacco, EtOH, drugs  ROS: per HPI       PAST MEDICAL & SURGICAL HISTORY:  Pleural effusion  Breast CA      FAMILY HISTORY:      MEDICATIONS  (STANDING):  pantoprazole Infusion 8 mG/Hr (10 mL/Hr) IV Continuous <Continuous>  insulin lispro (HumaLOG) corrective regimen sliding scale   SubCutaneous every 6 hours  dextrose 5%. 1000 milliLiter(s) (50 mL/Hr) IV Continuous <Continuous>  dextrose 50% Injectable 12.5 Gram(s) IV Push once  dextrose 50% Injectable 25 Gram(s) IV Push once  dextrose 50% Injectable 25 Gram(s) IV Push once  simvastatin 10 milliGRAM(s) Oral at bedtime  methimazole 10 milliGRAM(s) Oral daily  amLODIPine   Tablet 5 milliGRAM(s) Oral at bedtime  calcium carbonate  625 mG + Vitamin D (OsCal 250 + D) 1 Tablet(s) Oral two times a day  furosemide    Tablet 40 milliGRAM(s) Oral daily  influenza   Vaccine 0.5 milliLiter(s) IntraMuscular once    MEDICATIONS  (PRN):  acetaminophen   Tablet 650 milliGRAM(s) Oral every 6 hours PRN For Temp greater than 38 C (100.4 F), mild pain  ondansetron Injectable 4 milliGRAM(s) IV Push every 6 hours PRN Nausea  senna 2 Tablet(s) Oral at bedtime PRN Constipation  dextrose Gel 1 Dose(s) Oral once PRN Blood Glucose LESS THAN 70 milliGRAM(s)/deciliter  glucagon  Injectable 1 milliGRAM(s) IntraMuscular once PRN Glucose LESS THAN 70 milligrams/deciliter      Allergies    latex (Unknown)  No Known Drug Allergies    Intolerances        I&O's Summary        REVIEW OF SYSTEMS:    CONSTITUTIONAL:  As per HPI.  CONSTITUTIONAL: No weakness, fevers or chills  EYES/ENT: No visual changes;  No vertigo or throat pain   NECK: No pain or stiffness  CARDIOVASCULAR: No chest pain or palpitations  GASTROINTESTINAL: less abd pain  GENITOURINARY: No dysuria, frequency or hematuria  NEUROLOGICAL: No numbness or weakness  SKIN: No itching, burning, rashes, or lesions   All other review of systems is negative unless indicated above      Vital Signs Last 24 Hrs  T(C): 36.9 (14 Sep 2017 04:37), Max: 37.2 (14 Sep 2017 02:54)  T(F): 98.5 (14 Sep 2017 04:37), Max: 98.9 (14 Sep 2017 02:54)  HR: 93 (14 Sep 2017 04:37) (80 - 93)  BP: 142/69 (14 Sep 2017 04:37) (138/75 - 166/97)  BP(mean): 101 (13 Sep 2017 20:05) (101 - 101)  RR: 18 (14 Sep 2017 04:37) (16 - 21)  SpO2: 96% (14 Sep 2017 04:37) (96% - 100%)  Daily Height in cm: 162.56 (13 Sep 2017 13:56)    Daily Weight in k (14 Sep 2017 10:28)        PHYSICAL EXAM:    General:  Alert, well-developed ,No acute distress.    Neuro:  Alert and oriented to person, place, and time.    HEENT:  No JVD, no masses, Eyes anicteric, No carotid bruits. No lymphadenopathy,    Cardiovascular:  Regular rate and rhythm, with normal S1 and S2. No murmurs, rubs,  or gallops. No JVD.     Lungs:  clear. no rales, no wheezing, .    Abdomen:  Normoactive bowel sounds. Soft, flat, non-tender, and non-distended.  No hepatosplenomegaly, positive bowel sounds    Skin:  Warm, dry, well-perfused. No rashes or other lesions.     Extremities:  2+ pulses in upper and lower extremities. No lower extremity pain or  edema; legs are symmetric in appearance.    LABS:                        8.2    9.1   )-----------( 229      ( 14 Sep 2017 05:31 )             25.1     09-14    136  |  105  |  25<H>  ----------------------------<  82  4.3   |  22  |  1.76<H>    Ca    7.7<L>      14 Sep 2017 05:31    TPro  6.1  /  Alb  2.8<L>  /  TBili  0.4  /  DBili  x   /  AST  18  /  ALT  13  /  AlkPhos  74  09-13      Urinalysis Basic - ( 13 Sep 2017 15:34 )    Color: Yellow / Appearance: Clear / S.005 / pH: x  Gluc: x / Ketone: Negative  / Bili: Negative / Urobili: Negative mg/dL   Blood: x / Protein: 30 mg/dL / Nitrite: Negative   Leuk Esterase: Moderate / RBC: 3-5 /HPF / WBC 0-2   Sq Epi: x / Non Sq Epi: Few / Bacteria: x

## 2017-09-14 NOTE — PROGRESS NOTE ADULT - SUBJECTIVE AND OBJECTIVE BOX
History of Present Illness: 	  66 y.o. female with PMH breast ca s/p lumpectomies, LN dissections on chemo with L lymphedema, C2 fusion for pathologic fx, diastolic CHF, HTN, HLD, DM2, hx R pleural effusion, hx benign thyroid nodules, hyperthyroidism presents with abdominal pain. Pt states the abdominal pain started 2 weeks ago, and every time pt eats, she vomits. Pt states decreased po intake. Denies fever, chills, CP, palpitations, SOB. The abd pain is epigastric and L and R upper abdominal pain, improves with pain med and PPI. States burning sensation. Pt has LH and dizziness with change in positions. Pt denies blood in stool, but does report "dark" stool. Pt last chemo few months ago.    9/14-     ros-    Vital Signs Last 24 Hrs  T(C): 36.9 (14 Sep 2017 04:37), Max: 37.2 (14 Sep 2017 02:54)  T(F): 98.5 (14 Sep 2017 04:37), Max: 98.9 (14 Sep 2017 02:54)  HR: 93 (14 Sep 2017 04:37) (80 - 93)  BP: 142/69 (14 Sep 2017 04:37) (138/75 - 166/97)  BP(mean): 101 (13 Sep 2017 20:05) (101 - 101)  RR: 18 (14 Sep 2017 04:37) (16 - 21)  SpO2: 96% (14 Sep 2017 04:37) (96% - 100%)    physical            LABS: All Labs Reviewed:                        8.2    9.1   )-----------( 229      ( 14 Sep 2017 05:31 )             25.1     09-14    136  |  105  |  25<H>  ----------------------------<  82  4.3   |  22  |  1.76<H>    Ca    7.7<L>      14 Sep 2017 05:31    TPro  6.1  /  Alb  2.8<L>  /  TBili  0.4  /  DBili  x   /  AST  18  /  ALT  13  /  AlkPhos  74  09-13      CARDIAC MARKERS ( 13 Sep 2017 15:34 )  <0.015 ng/mL / x     / x     / x     / x        < from: CT Abdomen and Pelvis w/ Oral Cont (09.13.17 @ 18:41) >  IMPRESSION:   Moderate free fluid is seen within the pelvis. Gastric wall   thickening suggestive of gastritis. Moderate RIGHT pleural effusion.          < end of copied text >    < from: Xray Chest 2 Views PA/Lat (09.13.17 @ 15:25) >  IMPRESSION:  No acute cardiopulmonary process.    < end of copied text >  < from: Xray Chest 2 Views PA/Lat (09.13.17 @ 15:25) >  FINDINGS:  Continued application of the left IJ Cabrera's catheter, distal tip is in   the distal superior vena cava.  The airway is midline.  Elevation of the right hemidiaphragm is again noted. Lungs are clear.  The cardiac silhouette is normal size.   The bones are normal. Clips in the right axilla are again noted.     < end of copied text >    MEDICATIONS  (STANDING):  pantoprazole Infusion 8 mG/Hr (10 mL/Hr) IV Continuous <Continuous>  insulin lispro (HumaLOG) corrective regimen sliding scale   SubCutaneous every 6 hours  dextrose 5%. 1000 milliLiter(s) (50 mL/Hr) IV Continuous <Continuous>  dextrose 50% Injectable 12.5 Gram(s) IV Push once  dextrose 50% Injectable 25 Gram(s) IV Push once  dextrose 50% Injectable 25 Gram(s) IV Push once  simvastatin 10 milliGRAM(s) Oral at bedtime  methimazole 10 milliGRAM(s) Oral daily  amLODIPine   Tablet 5 milliGRAM(s) Oral at bedtime  calcium carbonate  625 mG + Vitamin D (OsCal 250 + D) 1 Tablet(s) Oral two times a day  furosemide    Tablet 40 milliGRAM(s) Oral daily  influenza   Vaccine 0.5 milliLiter(s) IntraMuscular once    MEDICATIONS  (PRN):  acetaminophen   Tablet 650 milliGRAM(s) Oral every 6 hours PRN For Temp greater than 38 C (100.4 F), mild pain  ondansetron Injectable 4 milliGRAM(s) IV Push every 6 hours PRN Nausea  senna 2 Tablet(s) Oral at bedtime PRN Constipation  dextrose Gel 1 Dose(s) Oral once PRN Blood Glucose LESS THAN 70 milliGRAM(s)/deciliter  glucagon  Injectable 1 milliGRAM(s) IntraMuscular once PRN Glucose LESS THAN 70 milligrams/deciliter      Assessment and Plan:   66y female admitted w/     *anemia likely due to GI bleed  - +fobt  - h/h improved s/p 2uprbc  - gastritis on CT  - PPI drip  - NPO at midnight for EGD, colonoscopy in am  - GI f/u appreciated     *breast cancer s/p lumpectomies, LN dissections  - sees Dr. Parker outpt  - Heme/Onc f/u appreciated    *n/v, abdominal pain  - in setting of above and GIB    *chronic diastolic CHF  - w/ right pleural effusion (has had thoracentesis in past)   - echo 8/2-17- EF 65-70%  - continue home dose lasix  - good O2 sats on room air  - repeat cxr in am - if not improved, may need thoracentesis    *IZA on CKD3-4  - Cr improved today, monitor on lasix   - ACEi held    *T2D  - hold metformin  - ISS, diabetic diet    *hyperthyroidism  - continue methimazole    *dvt px   - scds History of Present Illness: 	  66 y.o. female with PMH breast ca s/p lumpectomies, LN dissections on chemo with L lymphedema, C2 fusion for pathologic fx, diastolic CHF, HTN, HLD, DM2, hx R pleural effusion, hx benign thyroid nodules, hyperthyroidism presents with abdominal pain. Pt states the abdominal pain started 2 weeks ago, and every time pt eats, she vomits. Pt states decreased po intake. Denies fever, chills, CP, palpitations, SOB. The abd pain is epigastric and L and R upper abdominal pain, improves with pain med and PPI. States burning sensation. Pt has LH and dizziness with change in positions. Pt denies blood in stool, but does report "dark" stool. Pt last chemo few months ago.    9/14- feeling ok, no abdominal pain at present but c/o discomfort w/ meals.   No n/v/d.  No sob, cp, palp or cough.      ros-as per hpi    Vital Signs Last 24 Hrs  T(C): 36.9 (14 Sep 2017 04:37), Max: 37.2 (14 Sep 2017 02:54)  T(F): 98.5 (14 Sep 2017 04:37), Max: 98.9 (14 Sep 2017 02:54)  HR: 93 (14 Sep 2017 04:37) (80 - 93)  BP: 142/69 (14 Sep 2017 04:37) (138/75 - 166/97)  BP(mean): 101 (13 Sep 2017 20:05) (101 - 101)  RR: 18 (14 Sep 2017 04:37) (16 - 21)  SpO2: 96% (14 Sep 2017 04:37) (96% - 100%)    PHYSICAL EXAM:  General: NAD, comfortable, walking in hallway  Neuro: AAOx3, no focal deficits  HEENT: NCAT, PERRLA, EOMI,   Neck: Soft and supple, No JVD  Respiratory: decreased right lung sounds at bases  Cardiovascular: S1 and S2, RRR, no m/g/r  Gastrointestinal: +BS, soft, NTND, no rebound/guarding  Extremities: No c/c/e  Vascular: 2+ peripheral pulses  Musculoskeletal: 5/5 strength b/l UE and LE, sensation intact  Skin: No rashes        LABS: All Labs Reviewed:                        8.2    9.1   )-----------( 229      ( 14 Sep 2017 05:31 )             25.1     09-14    136  |  105  |  25<H>  ----------------------------<  82  4.3   |  22  |  1.76<H>    Ca    7.7<L>      14 Sep 2017 05:31    TPro  6.1  /  Alb  2.8<L>  /  TBili  0.4  /  DBili  x   /  AST  18  /  ALT  13  /  AlkPhos  74  09-13      CARDIAC MARKERS ( 13 Sep 2017 15:34 )  <0.015 ng/mL / x     / x     / x     / x        < from: CT Abdomen and Pelvis w/ Oral Cont (09.13.17 @ 18:41) >  IMPRESSION:   Moderate free fluid is seen within the pelvis. Gastric wall   thickening suggestive of gastritis. Moderate RIGHT pleural effusion.          < end of copied text >    < from: Xray Chest 2 Views PA/Lat (09.13.17 @ 15:25) >  IMPRESSION:  No acute cardiopulmonary process.    < end of copied text >  < from: Xray Chest 2 Views PA/Lat (09.13.17 @ 15:25) >  FINDINGS:  Continued application of the left IJ Cabrera's catheter, distal tip is in   the distal superior vena cava.  The airway is midline.  Elevation of the right hemidiaphragm is again noted. Lungs are clear.  The cardiac silhouette is normal size.   The bones are normal. Clips in the right axilla are again noted.     < end of copied text >    MEDICATIONS  (STANDING):  pantoprazole Infusion 8 mG/Hr (10 mL/Hr) IV Continuous <Continuous>  insulin lispro (HumaLOG) corrective regimen sliding scale   SubCutaneous every 6 hours  dextrose 5%. 1000 milliLiter(s) (50 mL/Hr) IV Continuous <Continuous>  dextrose 50% Injectable 12.5 Gram(s) IV Push once  dextrose 50% Injectable 25 Gram(s) IV Push once  dextrose 50% Injectable 25 Gram(s) IV Push once  simvastatin 10 milliGRAM(s) Oral at bedtime  methimazole 10 milliGRAM(s) Oral daily  amLODIPine   Tablet 5 milliGRAM(s) Oral at bedtime  calcium carbonate  625 mG + Vitamin D (OsCal 250 + D) 1 Tablet(s) Oral two times a day  furosemide    Tablet 40 milliGRAM(s) Oral daily  influenza   Vaccine 0.5 milliLiter(s) IntraMuscular once    MEDICATIONS  (PRN):  acetaminophen   Tablet 650 milliGRAM(s) Oral every 6 hours PRN For Temp greater than 38 C (100.4 F), mild pain  ondansetron Injectable 4 milliGRAM(s) IV Push every 6 hours PRN Nausea  senna 2 Tablet(s) Oral at bedtime PRN Constipation  dextrose Gel 1 Dose(s) Oral once PRN Blood Glucose LESS THAN 70 milliGRAM(s)/deciliter  glucagon  Injectable 1 milliGRAM(s) IntraMuscular once PRN Glucose LESS THAN 70 milligrams/deciliter      Assessment and Plan:   66y female admitted w/     *anemia likely due to GI bleed  - +fobt  - h/h improved s/p 2uprbc  - gastritis on CT  - PPI drip  - NPO at midnight for EGD, colonoscopy in am  - GI f/u appreciated     *breast cancer s/p lumpectomies, LN dissections  - sees Dr. Parker outpt  - Heme/Onc f/u appreciated    *n/v, abdominal pain  - in setting of above and GIB    *chronic diastolic CHF, right pleural effusion   - good O2 sats on room air, no sob   -  echo 8/2-17- EF 65-70%  - continue home dose lasix  - repeat cxr in am - if not improved, may need thoracentesis (pt had 5 months ago but uncertain of cause)  - Dr. Suresh consult- pt sees outpatient     *IZA on CKD3-4  - Cr improved today, monitor on lasix   - ACEi held  - Nephro f/u appreciated     *T2D  - hold metformin  - ISS, diabetic diet    *hyperthyroidism  - continue methimazole    *dvt px   - scds

## 2017-09-14 NOTE — DIETITIAN INITIAL EVALUATION ADULT. - NS AS NUTRI DX NUTRIENT
Pt meets criteria for severe protein-calorie malnutrition in context of chronic disease  with PO intake < 75% needs > one month, NFPE reveals moderate muscle wasting in clavicle, scapula, temporal area, thighs and calves., loss of 9% UBW in past 6 months./Malnutrition

## 2017-09-14 NOTE — DIETITIAN INITIAL EVALUATION ADULT. - ENERGY NEEDS
Ht.   64   "        Wt.        61kg               BMI  28                IBW  56     kg               Pt is at   134 %  IBW   ABW  61

## 2017-09-14 NOTE — CONSULT NOTE ADULT - ASSESSMENT
N V with anemia  NPO  IVF  keep HCT>24  plan EGD, A B R DW pt      pelvic ascites, concerning for mtastatatic dx, may happen with invasive lobular Ca  await on input      serial HCT
1) Breast Cancer - will get outpatient pathology, records, notes, and treatment plan.  Patient getting treatments in [facility] with Dr. Amauri Parker ([place]).  WBC and PLT normal.    2) Anemia - Stool guiac positive.  PRBC transfusion ordered.  GI consult pending.
66 y.o. female with PMH breast ca s/p lumpectomies, LN dissections on chemo with L lymphedema, C2 fusion for pathologic fx, diastolic CHF, HTN, HLD, DM2, hx R pleural effusion, hx benign thyroid nodules, hyperthyroidism presents with abdominal pain. Pt states the abdominal pain started 2 weeks ago, and every time pt eats, she vomits. Pt states decreased po intake. Denies fever, chills, CP, palpitations, SOB. The abd pain is epigastric and L and R upper abdominal pain, improves with pain med and PPI. States burning sensation. Pt has LH and dizziness with change in positions. Pt denies blood in stool, but does report "dark" stool. Pt last chemo few months ago.  Pt reports wanting to find oncologist that's local as she can't go to [facility] anymore.  Pt was admitted in August with elevated creat 1.8 -1.9 range  Never evaluated by Nephrology before..  Is aware of the fact that has renal issues, but not the etiology  CT abd pelvis reviewed , small calcification R kidney, no hydro.  Will request outside records to evaluate patients  baseline renal function..

## 2017-09-14 NOTE — DIETITIAN INITIAL EVALUATION ADULT. - PHYSICAL APPEARANCE
NFPE reveals moderate muscle wasting  (temples, scapula, clavicles)  Pt has swollen left arm.  Pt is at 136% IBW./well nourished

## 2017-09-14 NOTE — DIETITIAN INITIAL EVALUATION ADULT. - OTHER INFO
Consult for chew/swallow.  Pt has some trouble swallowing hard foods.  Pt has PMH of breast CA,DM,  presents with ab pain, decreased PO intake, N/V, CHF, CKD.  Patient reports wt loss of 9% of UBW in past 6 months due to loss of appetite.   Pt meets criteria for severe protein-calorie malnutrition in context of chronic disease  with PO intake < 75% needs > one month, NFPE reveals moderate muscle wasting in clavicle, scapula, temporal area, thighs and calves., loss of 9% UBW in past 6 months.

## 2017-09-15 ENCOUNTER — RESULT REVIEW (OUTPATIENT)
Age: 67
End: 2017-09-15

## 2017-09-15 DIAGNOSIS — Z90.49 ACQUIRED ABSENCE OF OTHER SPECIFIED PARTS OF DIGESTIVE TRACT: Chronic | ICD-10-CM

## 2017-09-15 DIAGNOSIS — Z90.710 ACQUIRED ABSENCE OF BOTH CERVIX AND UTERUS: Chronic | ICD-10-CM

## 2017-09-15 LAB
ANION GAP SERPL CALC-SCNC: 8 MMOL/L — SIGNIFICANT CHANGE UP (ref 5–17)
BUN SERPL-MCNC: 18 MG/DL — SIGNIFICANT CHANGE UP (ref 7–23)
CALCIUM SERPL-MCNC: 7.9 MG/DL — LOW (ref 8.5–10.1)
CHLORIDE SERPL-SCNC: 106 MMOL/L — SIGNIFICANT CHANGE UP (ref 96–108)
CO2 SERPL-SCNC: 23 MMOL/L — SIGNIFICANT CHANGE UP (ref 22–31)
CREAT SERPL-MCNC: 1.81 MG/DL — HIGH (ref 0.5–1.3)
FERRITIN SERPL-MCNC: 59 NG/ML — SIGNIFICANT CHANGE UP (ref 15–150)
GLUCOSE SERPL-MCNC: 86 MG/DL — SIGNIFICANT CHANGE UP (ref 70–99)
HCT VFR BLD CALC: 24.3 % — LOW (ref 34.5–45)
HCT VFR BLD CALC: 25.6 % — LOW (ref 34.5–45)
HGB BLD-MCNC: 7.9 G/DL — LOW (ref 11.5–15.5)
HGB BLD-MCNC: 8.5 G/DL — LOW (ref 11.5–15.5)
IRON SATN MFR SERPL: 22 UG/DL — LOW (ref 30–160)
POTASSIUM SERPL-MCNC: 4.1 MMOL/L — SIGNIFICANT CHANGE UP (ref 3.5–5.3)
POTASSIUM SERPL-SCNC: 4.1 MMOL/L — SIGNIFICANT CHANGE UP (ref 3.5–5.3)
RBC # BLD: 2.97 M/UL — LOW (ref 3.8–5.2)
RETICS #: 87.6 K/UL — SIGNIFICANT CHANGE UP (ref 25–125)
RETICS/RBC NFR: 3 % — HIGH (ref 0.5–2.5)
SODIUM SERPL-SCNC: 137 MMOL/L — SIGNIFICANT CHANGE UP (ref 135–145)

## 2017-09-15 PROCEDURE — 88312 SPECIAL STAINS GROUP 1: CPT | Mod: 26

## 2017-09-15 PROCEDURE — 88360 TUMOR IMMUNOHISTOCHEM/MANUAL: CPT | Mod: 26

## 2017-09-15 PROCEDURE — 93010 ELECTROCARDIOGRAM REPORT: CPT

## 2017-09-15 RX ORDER — SODIUM CHLORIDE 9 MG/ML
1000 INJECTION INTRAMUSCULAR; INTRAVENOUS; SUBCUTANEOUS
Qty: 0 | Refills: 0 | Status: DISCONTINUED | OUTPATIENT
Start: 2017-09-15 | End: 2017-09-17

## 2017-09-15 RX ORDER — OXYCODONE AND ACETAMINOPHEN 5; 325 MG/1; MG/1
1 TABLET ORAL ONCE
Qty: 0 | Refills: 0 | Status: DISCONTINUED | OUTPATIENT
Start: 2017-09-15 | End: 2017-09-15

## 2017-09-15 RX ADMIN — AMLODIPINE BESYLATE 5 MILLIGRAM(S): 2.5 TABLET ORAL at 20:49

## 2017-09-15 RX ADMIN — PANTOPRAZOLE SODIUM 10 MG/HR: 20 TABLET, DELAYED RELEASE ORAL at 18:18

## 2017-09-15 RX ADMIN — SIMVASTATIN 10 MILLIGRAM(S): 20 TABLET, FILM COATED ORAL at 20:49

## 2017-09-15 RX ADMIN — PANTOPRAZOLE SODIUM 10 MG/HR: 20 TABLET, DELAYED RELEASE ORAL at 07:08

## 2017-09-15 RX ADMIN — SODIUM CHLORIDE 60 MILLILITER(S): 9 INJECTION INTRAMUSCULAR; INTRAVENOUS; SUBCUTANEOUS at 18:19

## 2017-09-15 RX ADMIN — Medication 1 TABLET(S): at 06:10

## 2017-09-15 RX ADMIN — ONDANSETRON 4 MILLIGRAM(S): 8 TABLET, FILM COATED ORAL at 06:13

## 2017-09-15 RX ADMIN — Medication 40 MILLIGRAM(S): at 06:10

## 2017-09-15 RX ADMIN — OXYCODONE AND ACETAMINOPHEN 1 TABLET(S): 5; 325 TABLET ORAL at 22:44

## 2017-09-15 RX ADMIN — OXYCODONE AND ACETAMINOPHEN 1 TABLET(S): 5; 325 TABLET ORAL at 23:15

## 2017-09-15 NOTE — PROGRESS NOTE ADULT - SUBJECTIVE AND OBJECTIVE BOX
NEPHROLOGY CONSULT  HPI:  66 y.o. female with PMH breast ca s/p lumpectomies, LN dissections on chemo with L lymphedema, C2 fusion for pathologic fx, diastolic CHF, HTN, HLD, DM2, hx R pleural effusion, hx benign thyroid nodules, hyperthyroidism presents with abdominal pain. Pt states the abdominal pain started 2 weeks ago, and every time pt eats, she vomits. Pt states decreased po intake. Denies fever, chills, CP, palpitations, SOB. The abd pain is epigastric and L and R upper abdominal pain, improves with pain med and PPI. States burning sensation. Pt has LH and dizziness with change in positions. Pt denies blood in stool, but does report "dark" stool. Pt last chemo few months ago.  Pt reports wanting to find oncologist that's local as she can't go to [facility] anymore.  Pt was admitted in August with elevated creat 1.8 -1.9 range  Never evaluated by Nephrology before..  Is aware of the fact that has renal issues, but not the etiology    9/15  pts daughter at bedside  no new complaints   Left arm swollen      PMH: as above  [facility]: hysterectomy, b/l lumpectomies with LN dissections, cholecystectomy, thoracentesis, mediport placement  Family Hx: denies ca hx  Social Hx: denies tobacco, EtOH, drugs  ROS: per HPI       PAST MEDICAL & SURGICAL HISTORY:  Pleural effusion  Breast CA      FAMILY HISTORY:      MEDICATIONS  (STANDING):  pantoprazole Infusion 8 mG/Hr (10 mL/Hr) IV Continuous <Continuous>  insulin lispro (HumaLOG) corrective regimen sliding scale   SubCutaneous every 6 hours  dextrose 5%. 1000 milliLiter(s) (50 mL/Hr) IV Continuous <Continuous>  dextrose 50% Injectable 12.5 Gram(s) IV Push once  dextrose 50% Injectable 25 Gram(s) IV Push once  dextrose 50% Injectable 25 Gram(s) IV Push once  simvastatin 10 milliGRAM(s) Oral at bedtime  methimazole 10 milliGRAM(s) Oral daily  amLODIPine   Tablet 5 milliGRAM(s) Oral at bedtime  calcium carbonate  625 mG + Vitamin D (OsCal 250 + D) 1 Tablet(s) Oral two times a day  furosemide    Tablet 40 milliGRAM(s) Oral daily  influenza   Vaccine 0.5 milliLiter(s) IntraMuscular once  sodium chloride 0.9%. 1000 milliLiter(s) (60 mL/Hr) IV Continuous <Continuous>    MEDICATIONS  (PRN):  acetaminophen   Tablet 650 milliGRAM(s) Oral every 6 hours PRN For Temp greater than 38 C (100.4 F), mild pain  ondansetron Injectable 4 milliGRAM(s) IV Push every 6 hours PRN Nausea  senna 2 Tablet(s) Oral at bedtime PRN Constipation  dextrose Gel 1 Dose(s) Oral once PRN Blood Glucose LESS THAN 70 milliGRAM(s)/deciliter  glucagon  Injectable 1 milliGRAM(s) IntraMuscular once PRN Glucose LESS THAN 70 milligrams/deciliter        Allergies    latex (Unknown)  No Known Drug Allergies    Intolerances        I&O's Summary        REVIEW OF SYSTEMS:    CONSTITUTIONAL:  As per HPI.  CONSTITUTIONAL: No weakness, fevers or chills  EYES/ENT: No visual changes;  No vertigo or throat pain   NECK: No pain or stiffness  CARDIOVASCULAR: No chest pain or palpitations  GASTROINTESTINAL: less abd pain  GENITOURINARY: No dysuria, frequency or hematuria  NEUROLOGICAL: No numbness or weakness  SKIN: No itching, burning, rashes, or lesions, left swollen arm  All other review of systems is negative unless indicated above      Vital Signs Last 24 Hrs  T(C): 37.1 (15 Sep 2017 20:38), Max: 37.1 (15 Sep 2017 20:38)  T(F): 98.8 (15 Sep 2017 20:38), Max: 98.8 (15 Sep 2017 20:38)  HR: 91 (15 Sep 2017 22:48) (76 - 91)  BP: 149/67 (15 Sep 2017 22:48) (137/69 - 180/79)  BP(mean): --  RR: 16 (15 Sep 2017 20:38) (16 - 18)  SpO2: 95% (15 Sep 2017 20:38) (93% - 95%)        PHYSICAL EXAM:    General:  Alert, weak appearing    Neuro:  Alert and oriented to person, place, and time.    HEENT:  No JVD, no masses, Eyes anicteric, No carotid bruits. No lymphadenopathy,    Cardiovascular:  Regular rate and rhythm, with normal S1 and S2. No murmurs, rubs,  or gallops. No JVD.     Lungs:  clear. no rales, no wheezing, .    Abdomen:  Normoactive bowel sounds. Soft, flat, non-tender, and non-distended.  No hepatosplenomegaly, positive bowel sounds    Skin:  Warm, dry, well-perfused. No rashes or other lesions.     Extremities:  2+ pulses in upper and lower extremities., left arm swollen                                   8.5    x     )-----------( x        ( 15 Sep 2017 13:14 )             25.6     09-15    137  |  106  |  18  ----------------------------<  86  4.1   |  23  |  1.81<H>    Ca    7.9<L>      15 Sep 2017 05:50

## 2017-09-15 NOTE — PROGRESS NOTE ADULT - SUBJECTIVE AND OBJECTIVE BOX
History of Present Illness: 	  66 y.o. female with PMH breast ca s/p lumpectomies, LN dissections on chemo with L lymphedema, C2 fusion for pathologic fx, diastolic CHF, HTN, HLD, DM2, hx R pleural effusion, hx benign thyroid nodules, hyperthyroidism presents with abdominal pain. Pt states the abdominal pain started 2 weeks ago, and every time pt eats, she vomits. Pt states decreased po intake. Denies fever, chills, CP, palpitations, SOB. The abd pain is epigastric and L and R upper abdominal pain, improves with pain med and PPI. States burning sensation. Pt has LH and dizziness with change in positions. Pt denies blood in stool, but does report "dark" stool. Pt last chemo few months ago.    9/14- feeling ok, no abdominal pain at present but c/o discomfort w/ meals.   No n/v/d.  No sob, cp, palp or cough.    9/15- egd this morning-     ros-as per hpi    Vital Signs Last 24 Hrs  T(C): 36.8 (15 Sep 2017 14:29), Max: 36.8 (15 Sep 2017 08:49)  T(F): 98.3 (15 Sep 2017 14:29), Max: 98.3 (15 Sep 2017 08:49)  HR: 76 (15 Sep 2017 14:29) (76 - 87)  BP: 177/81 (15 Sep 2017 14:29) (137/69 - 177/81)  BP(mean): --  RR: 16 (15 Sep 2017 14:29) (16 - 18)  SpO2: 94% (15 Sep 2017 14:29) (93% - 97%)      PHYSICAL EXAM:  General: NAD, comfortable  Neuro: AAOx3, no focal deficits  HEENT: NCAT, PERRLA, EOMI,   Neck: Soft and supple, No JVD  Respiratory: decreased right lung sounds at bases  Cardiovascular: S1 and S2, RRR, no m/g/r  Gastrointestinal: +BS, soft, NTND, no rebound/guarding  Extremities: No c/c/e  Vascular: 2+ peripheral pulses  Musculoskeletal: 5/5 strength b/l UE and LE, sensation intact  Skin: No rashes        LABS: All Labs Reviewed:                        8.5    x     )-----------( x        ( 15 Sep 2017 13:14 )             25.6     09-15    137  |  106  |  18  ----------------------------<  86  4.1   |  23  |  1.81<H>    Ca    7.9<L>      15 Sep 2017 05:50                                  8.2    9.1   )-----------( 229      ( 14 Sep 2017 05:31 )             25.1     09-14    136  |  105  |  25<H>  ----------------------------<  82  4.3   |  22  |  1.76<H>    Ca    7.7<L>      14 Sep 2017 05:31    TPro  6.1  /  Alb  2.8<L>  /  TBili  0.4  /  DBili  x   /  AST  18  /  ALT  13  /  AlkPhos  74  09-13      CARDIAC MARKERS ( 13 Sep 2017 15:34 )  <0.015 ng/mL / x     / x     / x     / x        < from: CT Abdomen and Pelvis w/ Oral Cont (09.13.17 @ 18:41) >  IMPRESSION:   Moderate free fluid is seen within the pelvis. Gastric wall   thickening suggestive of gastritis. Moderate RIGHT pleural effusion.          < end of copied text >    < from: Upper Endoscopy (09.15.17 @ 07:04) >   A medium amount of food (residue) was found in the gastric body.    FINDING      Diffuse mildly erythematous mucosa without bleeding was found in the     FINDING      gastric body. Biopsies were taken with a cold forceps for histology.    FINDING      distal G body/antrum with obstruction and likely ulceration, bx done    FINDING      bx and eval limited due to food in stomach and pt with [place] cardia and     FINDING      hypoxemia, resolved with removing scope and bagging pt briefly    COMPLIC Complications:       Arrhythmia, Hypoxia    IMPRESS Impression:          - A medium amount of food (residue) in the stomach.    IMPRESS                      - Erythematous mucosa in the gastric body.Biopsied.    ENDORECOMMENDATION Recommendation:      - Await pathology results.    ENDORECOMMENDATION                      - NPO.    ENDORECOMMENDATION                      - Continue present medications.    ENDORECOMMENDATION                      limited eval due to above    < end of copied text >      < from: Xray Chest 2 Views PA/Lat (09.13.17 @ 15:25) >  IMPRESSION:  No acute cardiopulmonary process.    < end of copied text >  < from: Xray Chest 2 Views PA/Lat (09.13.17 @ 15:25) >  FINDINGS:  Continued application of the left IJ Cabrera's catheter, distal tip is in   the distal superior vena cava.  The airway is midline.  Elevation of the right hemidiaphragm is again noted. Lungs are clear.  The cardiac silhouette is normal size.   The bones are normal. Clips in the right axilla are again noted.     < end of copied text >    MEDICATIONS  (STANDING):  pantoprazole Infusion 8 mG/Hr (10 mL/Hr) IV Continuous <Continuous>  insulin lispro (HumaLOG) corrective regimen sliding scale   SubCutaneous every 6 hours  dextrose 5%. 1000 milliLiter(s) (50 mL/Hr) IV Continuous <Continuous>  dextrose 50% Injectable 12.5 Gram(s) IV Push once  dextrose 50% Injectable 25 Gram(s) IV Push once  dextrose 50% Injectable 25 Gram(s) IV Push once  simvastatin 10 milliGRAM(s) Oral at bedtime  methimazole 10 milliGRAM(s) Oral daily  amLODIPine   Tablet 5 milliGRAM(s) Oral at bedtime  calcium carbonate  625 mG + Vitamin D (OsCal 250 + D) 1 Tablet(s) Oral two times a day  furosemide    Tablet 40 milliGRAM(s) Oral daily  influenza   Vaccine 0.5 milliLiter(s) IntraMuscular once    MEDICATIONS  (PRN):  acetaminophen   Tablet 650 milliGRAM(s) Oral every 6 hours PRN For Temp greater than 38 C (100.4 F), mild pain  ondansetron Injectable 4 milliGRAM(s) IV Push every 6 hours PRN Nausea  senna 2 Tablet(s) Oral at bedtime PRN Constipation  dextrose Gel 1 Dose(s) Oral once PRN Blood Glucose LESS THAN 70 milliGRAM(s)/deciliter  glucagon  Injectable 1 milliGRAM(s) IntraMuscular once PRN Glucose LESS THAN 70 milligrams/deciliter        Assessment and Plan:   66y female admitted w/     *anemia likely due to GI bleed  EGD 9/15 w/ diffuse mildly erythematous mucosa without bleeding was found in the    gastric body. Biopsies taken.  Distal G body/antrum with obstruction and likely ulceration, bx done  - +fobt  - h/h improved s/p 2uprbc  - gastritis on CT  - PPI drip  - keep npo as per GI recs , IVF   - GI f/u appreciated     *breast cancer s/p lumpectomies, LN dissections  - sees Dr. Parker outpt  - Heme/Onc f/u appreciated    *chronic diastolic CHF, right pleural effusion   - good O2 sats on room air, no sob   -  echo 8/2-17- EF 65-70%  - continue home dose lasix  - repeat cxr in am - if not improved, may need thoracentesis (pt had 5 months ago but uncertain of cause)  - Dr. Suresh consult appreciated- pt asx, monitor, repeat cxr prior to discharge     *IZA on CKD3-4  - Cr improved, monitor on lasix   - ACEi held  - Nephro f/u appreciated     *T2D  - hold metformin  - ISS, diabetic diet    *hyperthyroidism  - continue methimazole    *dvt px   - scds History of Present Illness: 	  66 y.o. female with PMH breast ca s/p lumpectomies, LN dissections on chemo with L lymphedema, C2 fusion for pathologic fx, diastolic CHF, HTN, HLD, DM2, hx R pleural effusion, hx benign thyroid nodules, hyperthyroidism presents with abdominal pain. Pt states the abdominal pain started 2 weeks ago, and every time pt eats, she vomits. Pt states decreased po intake. Denies fever, chills, CP, palpitations, SOB. The abd pain is epigastric and L and R upper abdominal pain, improves with pain med and PPI. States burning sensation. Pt has LH and dizziness with change in positions. Pt denies blood in stool, but does report "dark" stool. Pt last chemo few months ago.    9/14- feeling ok, no abdominal pain at present but c/o discomfort w/ meals.   No n/v/d.  No sob, cp, palp or cough.    9/15- egd this morning- discussed w/ son over phone and patient.  Denies abd pain at present while resting but does have some abd pain when ambulating.  No n/v. No BM.  Denies cp, sob, palp.     ros-as per hpi    Vital Signs Last 24 Hrs  T(C): 36.8 (15 Sep 2017 14:29), Max: 36.8 (15 Sep 2017 08:49)  T(F): 98.3 (15 Sep 2017 14:29), Max: 98.3 (15 Sep 2017 08:49)  HR: 76 (15 Sep 2017 14:29) (76 - 87)  BP: 177/81 (15 Sep 2017 14:29) (137/69 - 177/81)  BP(mean): --  RR: 16 (15 Sep 2017 14:29) (16 - 18)  SpO2: 94% (15 Sep 2017 14:29) (93% - 97%)      PHYSICAL EXAM:  General: NAD, comfortably lying flat in bed  Neuro: AAOx3, no focal deficits  HEENT: NCAT, PERRLA, EOMI,   Neck: Soft and supple, No JVD  Respiratory: decreased right lung sounds at bases  Cardiovascular: S1 and S2, RRR, no m/g/r  Gastrointestinal: +BS, soft, NTND, no rebound/guarding  Extremities: No c/c/e  Vascular: 2+ peripheral pulses  Musculoskeletal: 5/5 strength b/l UE and LE, sensation intact  Skin: No rashes        LABS: All Labs Reviewed:                        8.5    x     )-----------( x        ( 15 Sep 2017 13:14 )             25.6     09-15    137  |  106  |  18  ----------------------------<  86  4.1   |  23  |  1.81<H>    Ca    7.9<L>      15 Sep 2017 05:50                                  8.2    9.1   )-----------( 229      ( 14 Sep 2017 05:31 )             25.1     09-14    136  |  105  |  25<H>  ----------------------------<  82  4.3   |  22  |  1.76<H>    Ca    7.7<L>      14 Sep 2017 05:31    TPro  6.1  /  Alb  2.8<L>  /  TBili  0.4  /  DBili  x   /  AST  18  /  ALT  13  /  AlkPhos  74  09-13      CARDIAC MARKERS ( 13 Sep 2017 15:34 )  <0.015 ng/mL / x     / x     / x     / x        < from: CT Abdomen and Pelvis w/ Oral Cont (09.13.17 @ 18:41) >  IMPRESSION:   Moderate free fluid is seen within the pelvis. Gastric wall   thickening suggestive of gastritis. Moderate RIGHT pleural effusion.          < end of copied text >    < from: Upper Endoscopy (09.15.17 @ 07:04) >   A medium amount of food (residue) was found in the gastric body.    FINDING      Diffuse mildly erythematous mucosa without bleeding was found in the     FINDING      gastric body. Biopsies were taken with a cold forceps for histology.    FINDING      distal G body/antrum with obstruction and likely ulceration, bx done    FINDING      bx and eval limited due to food in stomach and pt with [place] cardia and     FINDING      hypoxemia, resolved with removing scope and bagging pt briefly    COMPLIC Complications:       Arrhythmia, Hypoxia    IMPRESS Impression:          - A medium amount of food (residue) in the stomach.    IMPRESS                      - Erythematous mucosa in the gastric body.Biopsied.    ENDORECOMMENDATION Recommendation:      - Await pathology results.    ENDORECOMMENDATION                      - NPO.    ENDORECOMMENDATION                      - Continue present medications.    ENDORECOMMENDATION                      limited eval due to above    < end of copied text >      < from: Xray Chest 2 Views PA/Lat (09.13.17 @ 15:25) >  IMPRESSION:  No acute cardiopulmonary process.    < end of copied text >  < from: Xray Chest 2 Views PA/Lat (09.13.17 @ 15:25) >  FINDINGS:  Continued application of the left IJ Cabrera's catheter, distal tip is in   the distal superior vena cava.  The airway is midline.  Elevation of the right hemidiaphragm is again noted. Lungs are clear.  The cardiac silhouette is normal size.   The bones are normal. Clips in the right axilla are again noted.     < end of copied text >        MEDICATIONS  (STANDING):  pantoprazole Infusion 8 mG/Hr (10 mL/Hr) IV Continuous <Continuous>  insulin lispro (HumaLOG) corrective regimen sliding scale   SubCutaneous every 6 hours  dextrose 5%. 1000 milliLiter(s) (50 mL/Hr) IV Continuous <Continuous>  dextrose 50% Injectable 12.5 Gram(s) IV Push once  dextrose 50% Injectable 25 Gram(s) IV Push once  dextrose 50% Injectable 25 Gram(s) IV Push once  simvastatin 10 milliGRAM(s) Oral at bedtime  methimazole 10 milliGRAM(s) Oral daily  amLODIPine   Tablet 5 milliGRAM(s) Oral at bedtime  calcium carbonate  625 mG + Vitamin D (OsCal 250 + D) 1 Tablet(s) Oral two times a day  furosemide    Tablet 40 milliGRAM(s) Oral daily  influenza   Vaccine 0.5 milliLiter(s) IntraMuscular once    MEDICATIONS  (PRN):  acetaminophen   Tablet 650 milliGRAM(s) Oral every 6 hours PRN For Temp greater than 38 C (100.4 F), mild pain  ondansetron Injectable 4 milliGRAM(s) IV Push every 6 hours PRN Nausea  senna 2 Tablet(s) Oral at bedtime PRN Constipation  dextrose Gel 1 Dose(s) Oral once PRN Blood Glucose LESS THAN 70 milliGRAM(s)/deciliter  glucagon  Injectable 1 milliGRAM(s) IntraMuscular once PRN Glucose LESS THAN 70 milligrams/deciliter        Assessment and Plan:   66y female admitted w/     *anemia likely due to GI bleed  EGD 9/15 w/ diffuse mildly erythematous mucosa without bleeding was found in the    gastric body. Biopsies taken.  Distal G body/antrum with obstruction and likely ulceration, bx done  - +fobt  - h/h improved s/p 2uprbc  - gastritis on CT  - PPI drip  - keep npo as per GI recs , IVF ( discussed w/ pt and son- gentle IVF x 1 liter w/ rt effusion)  - GI f/u appreciated     *breast cancer s/p lumpectomies, LN dissections  - sees Dr. Parker outpt  - Heme/Onc f/u appreciated    *chronic diastolic CHF, right pleural effusion   - good O2 sats on room air, no sob   -  echo 8/2-17- EF 65-70%  - continue home dose lasix  - repeat cxr in am - if not improved, may need thoracentesis (pt had 5 months ago but uncertain of cause)  - Dr. 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

## 2017-09-16 LAB
ANION GAP SERPL CALC-SCNC: 9 MMOL/L — SIGNIFICANT CHANGE UP (ref 5–17)
BUN SERPL-MCNC: 18 MG/DL — SIGNIFICANT CHANGE UP (ref 7–23)
CALCIUM SERPL-MCNC: 8.2 MG/DL — LOW (ref 8.5–10.1)
CHLORIDE SERPL-SCNC: 107 MMOL/L — SIGNIFICANT CHANGE UP (ref 96–108)
CO2 SERPL-SCNC: 22 MMOL/L — SIGNIFICANT CHANGE UP (ref 22–31)
CREAT SERPL-MCNC: 1.75 MG/DL — HIGH (ref 0.5–1.3)
GLUCOSE SERPL-MCNC: 67 MG/DL — LOW (ref 70–99)
HCT VFR BLD CALC: 24.7 % — LOW (ref 34.5–45)
HGB BLD-MCNC: 8.2 G/DL — LOW (ref 11.5–15.5)
MCHC RBC-ENTMCNC: 26.7 PG — LOW (ref 27–34)
MCHC RBC-ENTMCNC: 33.3 GM/DL — SIGNIFICANT CHANGE UP (ref 32–36)
MCV RBC AUTO: 80.1 FL — SIGNIFICANT CHANGE UP (ref 80–100)
PLATELET # BLD AUTO: 226 K/UL — SIGNIFICANT CHANGE UP (ref 150–400)
POTASSIUM SERPL-MCNC: 3.7 MMOL/L — SIGNIFICANT CHANGE UP (ref 3.5–5.3)
POTASSIUM SERPL-SCNC: 3.7 MMOL/L — SIGNIFICANT CHANGE UP (ref 3.5–5.3)
RBC # BLD: 3.09 M/UL — LOW (ref 3.8–5.2)
RBC # FLD: 15.9 % — HIGH (ref 10.3–14.5)
SODIUM SERPL-SCNC: 138 MMOL/L — SIGNIFICANT CHANGE UP (ref 135–145)
WBC # BLD: 6.5 K/UL — SIGNIFICANT CHANGE UP (ref 3.8–10.5)
WBC # FLD AUTO: 6.5 K/UL — SIGNIFICANT CHANGE UP (ref 3.8–10.5)

## 2017-09-16 PROCEDURE — 71010: CPT | Mod: 26

## 2017-09-16 RX ADMIN — SODIUM CHLORIDE 60 MILLILITER(S): 9 INJECTION INTRAMUSCULAR; INTRAVENOUS; SUBCUTANEOUS at 06:28

## 2017-09-16 RX ADMIN — Medication 1 TABLET(S): at 17:16

## 2017-09-16 RX ADMIN — SENNA PLUS 2 TABLET(S): 8.6 TABLET ORAL at 21:22

## 2017-09-16 RX ADMIN — AMLODIPINE BESYLATE 5 MILLIGRAM(S): 2.5 TABLET ORAL at 21:22

## 2017-09-16 RX ADMIN — PANTOPRAZOLE SODIUM 10 MG/HR: 20 TABLET, DELAYED RELEASE ORAL at 11:48

## 2017-09-16 RX ADMIN — SIMVASTATIN 10 MILLIGRAM(S): 20 TABLET, FILM COATED ORAL at 21:22

## 2017-09-16 RX ADMIN — Medication 40 MILLIGRAM(S): at 06:21

## 2017-09-16 RX ADMIN — PANTOPRAZOLE SODIUM 10 MG/HR: 20 TABLET, DELAYED RELEASE ORAL at 04:30

## 2017-09-16 RX ADMIN — SODIUM CHLORIDE 60 MILLILITER(S): 9 INJECTION INTRAMUSCULAR; INTRAVENOUS; SUBCUTANEOUS at 21:23

## 2017-09-16 RX ADMIN — Medication 1 TABLET(S): at 06:21

## 2017-09-16 RX ADMIN — PANTOPRAZOLE SODIUM 10 MG/HR: 20 TABLET, DELAYED RELEASE ORAL at 21:27

## 2017-09-16 NOTE — PROGRESS NOTE ADULT - SUBJECTIVE AND OBJECTIVE BOX
NEPHROLOGY CONSULT  HPI:  66 y.o. female with PMH breast ca s/p lumpectomies, LN dissections on chemo with L lymphedema, C2 fusion for pathologic fx, diastolic CHF, HTN, HLD, DM2, hx R pleural effusion, hx benign thyroid nodules, hyperthyroidism presents with abdominal pain. Pt states the abdominal pain started 2 weeks ago, and every time pt eats, she vomits. Pt states decreased po intake. Denies fever, chills, CP, palpitations, SOB. The abd pain is epigastric and L and R upper abdominal pain, improves with pain med and PPI. States burning sensation. Pt has LH and dizziness with change in positions. Pt denies blood in stool, but does report "dark" stool. Pt last chemo few months ago.  Pt reports wanting to find oncologist that's local as she can't go to [facility] anymore.  Pt was admitted in August with elevated creat 1.8 -1.9 range  Never evaluated by Nephrology before..  Is aware of the fact that has renal issues, but not the etiology    9/15  pts daughter at bedside  no new complaints   Left arm swollen    9/16  Wearing left arm stocking  feels well  pt seen w Dr Cortez  Gastric obstruction, npo  starting clear liquids  abd pin much better          PMH: as above  [facility]: hysterectomy, b/l lumpectomies with LN dissections, cholecystectomy, thoracentesis, mediport placement  Family Hx: denies ca hx  Social Hx: denies tobacco, EtOH, drugs  ROS: per HPI       PAST MEDICAL & SURGICAL HISTORY:  Pleural effusion  Breast CA      FAMILY HISTORY:      MEDICATIONS  (STANDING):  pantoprazole Infusion 8 mG/Hr (10 mL/Hr) IV Continuous <Continuous>  insulin lispro (HumaLOG) corrective regimen sliding scale   SubCutaneous every 6 hours  dextrose 5%. 1000 milliLiter(s) (50 mL/Hr) IV Continuous <Continuous>  dextrose 50% Injectable 12.5 Gram(s) IV Push once  dextrose 50% Injectable 25 Gram(s) IV Push once  dextrose 50% Injectable 25 Gram(s) IV Push once  simvastatin 10 milliGRAM(s) Oral at bedtime  methimazole 10 milliGRAM(s) Oral daily  amLODIPine   Tablet 5 milliGRAM(s) Oral at bedtime  calcium carbonate  625 mG + Vitamin D (OsCal 250 + D) 1 Tablet(s) Oral two times a day  furosemide    Tablet 40 milliGRAM(s) Oral daily  influenza   Vaccine 0.5 milliLiter(s) IntraMuscular once  sodium chloride 0.9%. 1000 milliLiter(s) (60 mL/Hr) IV Continuous <Continuous>    MEDICATIONS  (PRN):  acetaminophen   Tablet 650 milliGRAM(s) Oral every 6 hours PRN For Temp greater than 38 C (100.4 F), mild pain  ondansetron Injectable 4 milliGRAM(s) IV Push every 6 hours PRN Nausea  senna 2 Tablet(s) Oral at bedtime PRN Constipation  dextrose Gel 1 Dose(s) Oral once PRN Blood Glucose LESS THAN 70 milliGRAM(s)/deciliter  glucagon  Injectable 1 milliGRAM(s) IntraMuscular once PRN Glucose LESS THAN 70 milligrams/deciliter        Allergies    latex (Unknown)  No Known Drug Allergies    Intolerances        I&O's Summary        REVIEW OF SYSTEMS:    CONSTITUTIONAL:  As per HPI.  CONSTITUTIONAL: No weakness, fevers or chills  EYES/ENT: No visual changes;  No vertigo or throat pain   NECK: No pain or stiffness  CARDIOVASCULAR: No chest pain or palpitations  GASTROINTESTINAL: less abd pain  GENITOURINARY: No dysuria, frequency or hematuria  NEUROLOGICAL: No numbness or weakness  SKIN: No itching, burning, rashes, or lesions, left swollen arm  All other review of systems is negative unless indicated above      Vital Signs Last 24 Hrs  T(C): 37 (16 Sep 2017 06:25), Max: 37.1 (15 Sep 2017 20:38)  T(F): 98.6 (16 Sep 2017 06:25), Max: 98.8 (15 Sep 2017 20:38)  HR: 82 (16 Sep 2017 06:25) (80 - 91)  BP: 148/79 (16 Sep 2017 06:25) (144/68 - 180/79)  BP(mean): --  RR: 16 (16 Sep 2017 06:25) (16 - 18)  SpO2: 98% (16 Sep 2017 06:25) (95% - 98%)        PHYSICAL EXAM:    General:  Alert, weak appearing, improved since yesterday    Neuro:  Alert and oriented to person, place, and time.    HEENT:  No JVD, no masses, Eyes anicteric, No carotid bruits. No lymphadenopathy,    Cardiovascular:  Regular rate and rhythm, with normal S1 and S2. No murmurs, rubs,  or gallops. No JVD.     Lungs:  clear. no rales, no wheezing, .    Abdomen:  Normoactive bowel sounds. Soft, flat, non-tender, and non-distended.  No hepatosplenomegaly, positive bowel sounds, mild abd pain on palpation, improved    Skin:  Warm, dry, well-perfused. No rashes or other lesions.     Extremities:  2+ pulses in upper and lower extremities., left arm swollen                                    8.2    6.5   )-----------( 226      ( 16 Sep 2017 05:52 )             24.7           09-16    138  |  107  |  18  ----------------------------<  67<L>  3.7   |  22  |  1.75<H>    Ca    8.2<L>      16 Sep 2017 05:52

## 2017-09-16 NOTE — PROGRESS NOTE ADULT - SUBJECTIVE AND OBJECTIVE BOX
History of Present Illness: 	  66 y.o. female with PMH breast ca s/p lumpectomies, LN dissections on chemo with L lymphedema, C2 fusion for pathologic fx, diastolic CHF, HTN, HLD, DM2, hx R pleural effusion, hx benign thyroid nodules, hyperthyroidism presents with abdominal pain. Pt states the abdominal pain started 2 weeks ago, and every time pt eats, she vomits. Pt states decreased po intake. Denies fever, chills, CP, palpitations, SOB. The abd pain is epigastric and L and R upper abdominal pain, improves with pain med and PPI. States burning sensation. Pt has LH and dizziness with change in positions. Pt denies blood in stool, but does report "dark" stool. Pt last chemo few months ago.    9/14- feeling ok, no abdominal pain at present but c/o discomfort w/ meals.   No n/v/d.  No sob, cp, palp or cough.    9/15- egd this morning- discussed w/ son over phone and patient.  Denies abd pain at present while resting but does have some abd pain when ambulating.  No n/v. No BM.  Denies cp, sob, palp.     9/16- abd discomfort overnight relieved w/ percocet x 1.   Feeling well this morning, no pain, no n/v.  No BM.  Hungry.      ros-as per hpi    Vital Signs Last 24 Hrs  T(C): 36.7 (16 Sep 2017 06:16), Max: 37.1 (15 Sep 2017 20:38)  T(F): 98.1 (16 Sep 2017 06:16), Max: 98.8 (15 Sep 2017 20:38)  HR: 80 (16 Sep 2017 06:16) (76 - 91)  BP: 144/68 (16 Sep 2017 06:16) (144/68 - 180/79)      PHYSICAL EXAM:  General: NAD, comfortably lying flat in bed  Neuro: AAOx3, no focal deficits  HEENT: NCAT, PERRLA, EOMI,   Neck: Soft and supple, No JVD  Respiratory: decreased right lung sounds at bases  Cardiovascular: S1 and S2, RRR, no m/g/r  Gastrointestinal: +BS, soft, nondistended, minimal ttp, no rebound/guarding  Extremities: No c/c/e  Vascular: 2+ peripheral pulses  Musculoskeletal: 5/5 strength b/l UE and LE, sensation intact  Skin: No rashes        LABS: All Labs Reviewed:                        8.2    6.5   )-----------( 226      ( 16 Sep 2017 05:52 )             24.7     09-16    138  |  107  |  18  ----------------------------<  67<L>  3.7   |  22  |  1.75<H>    Ca    8.2<L>      16 Sep 2017 05:52                             8.5    x     )-----------( x        ( 15 Sep 2017 13:14 )             25.6     09-15    137  |  106  |  18  ----------------------------<  86  4.1   |  23  |  1.81<H>    Ca    7.9<L>      15 Sep 2017 05:50          CARDIAC MARKERS ( 13 Sep 2017 15:34 )  <0.015 ng/mL / x     / x     / x     / x        < from: CT Abdomen and Pelvis w/ Oral Cont (09.13.17 @ 18:41) >  IMPRESSION:   Moderate free fluid is seen within the pelvis. Gastric wall   thickening suggestive of gastritis. Moderate RIGHT pleural effusion.          < end of copied text >    < from: Upper Endoscopy (09.15.17 @ 07:04) >   A medium amount of food (residue) was found in the gastric body.    FINDING      Diffuse mildly erythematous mucosa without bleeding was found in the     FINDING      gastric body. Biopsies were taken with a cold forceps for histology.    FINDING      distal G body/antrum with obstruction and likely ulceration, bx done    FINDING      bx and eval limited due to food in stomach and pt with [place] and     FINDING      hypoxemia, resolved with removing scope and bagging pt briefly    COMPLIC Complications:       Arrhythmia, Hypoxia    IMPRESS Impression:          - A medium amount of food (residue) in the stomach.    IMPRESS                      - Erythematous mucosa in the gastric body.Biopsied.    ENDORECOMMENDATION Recommendation:      - Await pathology results.    ENDORECOMMENDATION                      - NPO.    ENDORECOMMENDATION                      - Continue present medications.    ENDORECOMMENDATION                      limited eval due to above    < end of copied text >      < from: Xray Chest 2 Views PA/Lat (09.13.17 @ 15:25) >  IMPRESSION:  No acute cardiopulmonary process.    < end of copied text >  < from: Xray Chest 2 Views PA/Lat (09.13.17 @ 15:25) >  FINDINGS:  Continued application of the left IJ Cabrera's catheter, distal tip is in   the distal superior vena cava.  The airway is midline.  Elevation of the right hemidiaphragm is again noted. Lungs are clear.  The cardiac silhouette is normal size.   The bones are normal. Clips in the right axilla are again noted.     < end of copied text >        MEDICATIONS  (STANDING):  pantoprazole Infusion 8 mG/Hr (10 mL/Hr) IV Continuous <Continuous>  insulin lispro (HumaLOG) corrective regimen sliding scale   SubCutaneous every 6 hours  dextrose 5%. 1000 milliLiter(s) (50 mL/Hr) IV Continuous <Continuous>  dextrose 50% Injectable 12.5 Gram(s) IV Push once  dextrose 50% Injectable 25 Gram(s) IV Push once  dextrose 50% Injectable 25 Gram(s) IV Push once  simvastatin 10 milliGRAM(s) Oral at bedtime  methimazole 10 milliGRAM(s) Oral daily  amLODIPine   Tablet 5 milliGRAM(s) Oral at bedtime  calcium carbonate  625 mG + Vitamin D (OsCal 250 + D) 1 Tablet(s) Oral two times a day  furosemide    Tablet 40 milliGRAM(s) Oral daily  influenza   Vaccine 0.5 milliLiter(s) IntraMuscular once    MEDICATIONS  (PRN):  acetaminophen   Tablet 650 milliGRAM(s) Oral every 6 hours PRN For Temp greater than 38 C (100.4 F), mild pain  ondansetron Injectable 4 milliGRAM(s) IV Push every 6 hours PRN Nausea  senna 2 Tablet(s) Oral at bedtime PRN Constipation  dextrose Gel 1 Dose(s) Oral once PRN Blood Glucose LESS THAN 70 milliGRAM(s)/deciliter  glucagon  Injectable 1 milliGRAM(s) IntraMuscular once PRN Glucose LESS THAN 70 milligrams/deciliter        Assessment and Plan:   66y female admitted w/     *anemia likely due to GI bleed  EGD 9/15 w/ diffuse mildly erythematous mucosa without bleeding was found in the    gastric body. Biopsies taken.  Distal G body/antrum with obstruction and likely ulceration, bx done  - +fobt  - h/h improved s/p 2uprbc- stable  - gastritis on CT  - PPI drip  - keep npo as per GI recs , IVF ( discussed w/ pt and son- gentle IVF x 1 liter w/ rt effusion)  - GI f/u appreciated   9/16- ?start clears today,  pt and daughter wish to discuss w/ GI    *breast cancer s/p lumpectomies, LN dissections  - sees Dr. Parker outpt  - Heme/Onc f/u appreciated    *chronic diastolic CHF, right pleural effusion   - good O2 sats on room air, no sob   -  echo 8/2-17- EF 65-70%  - continue home dose lasix  - repeat cxr in am - if not improved, may need thoracentesis (pt had 5 months ago but uncertain of cause)  - Dr. Suresh consult appreciated- pt asx, monitor, repeat cxr this morning pending     *IZA on CKD3-4  - Cr improved, monitor on lasix   - ACEi held  - Nephro f/u appreciated     *T2D  - hold metformin  - ISS, diabetic diet    *hyperthyroidism  - continue methimazole    *dvt px   - scds

## 2017-09-16 NOTE — PROGRESS NOTE ADULT - SUBJECTIVE AND OBJECTIVE BOX
COLEMAN CARRANZA  MRN: [id number]    S: 9/16: Denies dyspnea. No c/o chest pain.     PAST MEDICAL & SURGICAL HISTORY:  Pleural effusion  Breast CA  S/P cholecystectomy  H/O: hysterectomy      O: T(C): 37 (09-16-17 @ 06:25), Max: 37.1 (09-15-17 @ 20:38)  HR: 82 (09-16-17 @ 06:25) (80 - 91)  BP: 148/79 (09-16-17 @ 06:25) (144/68 - 180/79)  RR: 16 (09-16-17 @ 06:25) (16 - 18)  SpO2: 98% (09-16-17 @ 06:25) (95% - 98%)  Wt(kg): --    PHYSICAL EXAM:      GENERAL: weak    NEURO: awake and alert    NECK: no JVD    CHEST: decreased right base    CARDIAC: RR    ABD: soft; mildly distended    EXT: no edema      LABS:                        8.2    6.5   )-----------( 226      ( 16 Sep 2017 05:52 )             24.7       09-16    138  |  107  |  18  ----------------------------<  67<L>  3.7   |  22  |  1.75<H>    Ca    8.2<L>      16 Sep 2017 05:52    RADIOLOGY      EXAM:  CHEST SINGLE VIEW FRONTAL                        PROCEDURE DATE:  09/16/2017      INTERPRETATION:  History: Dyspnea     Chest:  one view.      Comparison: 9/13/2017    AP radiograph of the chest demonstrates moderate RIGHT pleural effusion   with underlying atelectasis or infiltrate. The cardiac silhouette is   normal in size. Osseous structures are intact.    Impression:moderate RIGHT pleural effusion with underlying atelectasis or   infiltrate    WILL JONES M.D., ATTENDING RADIOLOGIST          MEDICATIONS  (STANDING):  pantoprazole Infusion 8 mG/Hr (10 mL/Hr) IV Continuous <Continuous>  insulin lispro (HumaLOG) corrective regimen sliding scale   SubCutaneous every 6 hours  dextrose 5%. 1000 milliLiter(s) (50 mL/Hr) IV Continuous <Continuous>  dextrose 50% Injectable 12.5 Gram(s) IV Push once  dextrose 50% Injectable 25 Gram(s) IV Push once  dextrose 50% Injectable 25 Gram(s) IV Push once  simvastatin 10 milliGRAM(s) Oral at bedtime  methimazole 10 milliGRAM(s) Oral daily  amLODIPine   Tablet 5 milliGRAM(s) Oral at bedtime  calcium carbonate  625 mG + Vitamin D (OsCal 250 + D) 1 Tablet(s) Oral two times a day  furosemide    Tablet 40 milliGRAM(s) Oral daily  influenza   Vaccine 0.5 milliLiter(s) IntraMuscular once  sodium chloride 0.9%. 1000 milliLiter(s) (60 mL/Hr) IV Continuous <Continuous>    MEDICATIONS  (PRN):  acetaminophen   Tablet 650 milliGRAM(s) Oral every 6 hours PRN For Temp greater than 38 C (100.4 F), mild pain  ondansetron Injectable 4 milliGRAM(s) IV Push every 6 hours PRN Nausea  senna 2 Tablet(s) Oral at bedtime PRN Constipation  dextrose Gel 1 Dose(s) Oral once PRN Blood Glucose LESS THAN 70 milliGRAM(s)/deciliter  glucagon  Injectable 1 milliGRAM(s) IntraMuscular once PRN Glucose LESS THAN 70 milligrams/deciliter        A/P: 1. Abdominal pain. Per GI. S/P endoscopy.     2. Anemia. Follow H/H.    3. Right pleural effusion. ? etiology. Consider diagnostic thoracentesis.  Follow up CXR.      4. Renal insufficiency.  Nephrology follow up.     5. Hx breast cancer.     6. Hx of diastolic dysfunction. No evidence to suggest decompensated CHF.     Will follow.

## 2017-09-16 NOTE — PROVIDER CONTACT NOTE (OTHER) - SITUATION
pt having some pain to abdomen, has nothing ordered for pain. requesting medication that she received the other day.

## 2017-09-16 NOTE — PROGRESS NOTE ADULT - SUBJECTIVE AND OBJECTIVE BOX
Patient is a 66y old  Female who presents with a chief complaint of abd pain, dizziness (13 Sep 2017 22:28)      HPI:  pt hungry, no bm in days  mild abd distention    PAST MEDICAL & SURGICAL HISTORY:  Pleural effusion  Breast CA  S/P cholecystectomy  H/O: hysterectomy    MEDICATIONS  (STANDING):  pantoprazole Infusion 8 mG/Hr (10 mL/Hr) IV Continuous <Continuous>  insulin lispro (HumaLOG) corrective regimen sliding scale   SubCutaneous every 6 hours  dextrose 5%. 1000 milliLiter(s) (50 mL/Hr) IV Continuous <Continuous>  dextrose 50% Injectable 12.5 Gram(s) IV Push once  dextrose 50% Injectable 25 Gram(s) IV Push once  dextrose 50% Injectable 25 Gram(s) IV Push once  simvastatin 10 milliGRAM(s) Oral at bedtime  methimazole 10 milliGRAM(s) Oral daily  amLODIPine   Tablet 5 milliGRAM(s) Oral at bedtime  calcium carbonate  625 mG + Vitamin D (OsCal 250 + D) 1 Tablet(s) Oral two times a day  furosemide    Tablet 40 milliGRAM(s) Oral daily  influenza   Vaccine 0.5 milliLiter(s) IntraMuscular once  sodium chloride 0.9%. 1000 milliLiter(s) (60 mL/Hr) IV Continuous <Continuous>    MEDICATIONS  (PRN):  acetaminophen   Tablet 650 milliGRAM(s) Oral every 6 hours PRN For Temp greater than 38 C (100.4 F), mild pain  ondansetron Injectable 4 milliGRAM(s) IV Push every 6 hours PRN Nausea  senna 2 Tablet(s) Oral at bedtime PRN Constipation  dextrose Gel 1 Dose(s) Oral once PRN Blood Glucose LESS THAN 70 milliGRAM(s)/deciliter  glucagon  Injectable 1 milliGRAM(s) IntraMuscular once PRN Glucose LESS THAN 70 milligrams/deciliter    Allergies  latex (Unknown)  No Known Drug Allergies    REVIEW OF SYSTEMS:    CONSTITUTIONAL: mild weakness  RESPIRATORY: No cough, wheezing, hemoptysis; No shortness of breath  CARDIOVASCULAR: No chest pain or palpitations  GASTROINTESTINAL: no bm in days, decreased appetite  All other review of systems is negative unless indicated above.    Vital Signs Last 24 Hrs  T(C): 37 (16 Sep 2017 06:25), Max: 37.1 (15 Sep 2017 20:38)  T(F): 98.6 (16 Sep 2017 06:25), Max: 98.8 (15 Sep 2017 20:38)  HR: 82 (16 Sep 2017 06:25) (76 - 91)  BP: 148/79 (16 Sep 2017 06:25) (144/68 - 180/79)  BP(mean): --  RR: 16 (16 Sep 2017 06:25) (16 - 18)  SpO2: 98% (16 Sep 2017 06:25) (94% - 98%)    PHYSICAL EXAM:    Constitutional: NAD, well-developed  Respiratory: CTAB  Cardiovascular: S1 and S2, RRR, no M/G/R  Gastrointestinal: BS+, softly distended      LABS:                        8.2    6.5   )-----------( 226      ( 16 Sep 2017 05:52 )             24.7     09-16    138  |  107  |  18  ----------------------------<  67<L>  3.7   |  22  |  1.75<H>    Ca    8.2<L>      16 Sep 2017 05:52            RADIOLOGY & ADDITIONAL STUDIES:

## 2017-09-17 LAB
HCT VFR BLD CALC: 28.7 % — LOW (ref 34.5–45)
HGB BLD-MCNC: 9.6 G/DL — LOW (ref 11.5–15.5)

## 2017-09-17 PROCEDURE — 71020: CPT | Mod: 26

## 2017-09-17 RX ORDER — PANTOPRAZOLE SODIUM 20 MG/1
40 TABLET, DELAYED RELEASE ORAL
Qty: 0 | Refills: 0 | Status: DISCONTINUED | OUTPATIENT
Start: 2017-09-17 | End: 2017-09-20

## 2017-09-17 RX ADMIN — Medication 40 MILLIGRAM(S): at 05:52

## 2017-09-17 RX ADMIN — SIMVASTATIN 10 MILLIGRAM(S): 20 TABLET, FILM COATED ORAL at 22:13

## 2017-09-17 RX ADMIN — AMLODIPINE BESYLATE 5 MILLIGRAM(S): 2.5 TABLET ORAL at 22:14

## 2017-09-17 RX ADMIN — Medication 1 TABLET(S): at 17:22

## 2017-09-17 RX ADMIN — Medication 1 TABLET(S): at 05:52

## 2017-09-17 RX ADMIN — PANTOPRAZOLE SODIUM 40 MILLIGRAM(S): 20 TABLET, DELAYED RELEASE ORAL at 17:22

## 2017-09-17 RX ADMIN — PANTOPRAZOLE SODIUM 10 MG/HR: 20 TABLET, DELAYED RELEASE ORAL at 07:58

## 2017-09-17 NOTE — PROGRESS NOTE ADULT - SUBJECTIVE AND OBJECTIVE BOX
Patient is a 66y old  Female who presents with a chief complaint of abd pain, dizziness (13 Sep 2017 22:28)      HPI:  66 y.o. female with PMH breast ca s/p lumpectomies, LN dissections on chemo with L lymphedema, C2 fusion for pathologic fx, diastolic CHF, HTN, HLD, DM2, hx R pleural effusion, hx benign thyroid nodules, hyperthyroidism presents with abdominal pain. Pt states the abdominal pain started 2 weeks ago, and every time pt eats, she vomits. Pt states decreased po intake. Denies fever, chills, CP, palpitations, SOB. The abd pain is epigastric and L and R upper abdominal pain, improves with pain med and PPI. States burning sensation. Pt has LH and dizziness with change in positions. Pt denies blood in stool, but does report "dark" stool. Pt last chemo few months ago.    Pt reports wanting to find oncologist that's local as she can't go to [facility] anymore.    Patient tolerated clear liquids. Moving bowels. No abdominal pain today.    PMH: as above  PSMH: hysterectomy, b/l lumpectomies with LN dissections, cholecystectomy, thoracentesis, mediport placement  Family Hx: denies ca hx  Social Hx: denies tobacco, EtOH, drugs  ROS: per HPI (13 Sep 2017 22:28)      PAST MEDICAL & SURGICAL HISTORY:  Pleural effusion  Breast CA  S/P cholecystectomy  H/O: hysterectomy      MEDICATIONS  (STANDING):  pantoprazole Infusion 8 mG/Hr (10 mL/Hr) IV Continuous <Continuous>  insulin lispro (HumaLOG) corrective regimen sliding scale   SubCutaneous every 6 hours  dextrose 5%. 1000 milliLiter(s) (50 mL/Hr) IV Continuous <Continuous>  dextrose 50% Injectable 12.5 Gram(s) IV Push once  dextrose 50% Injectable 25 Gram(s) IV Push once  dextrose 50% Injectable 25 Gram(s) IV Push once  simvastatin 10 milliGRAM(s) Oral at bedtime  methimazole 10 milliGRAM(s) Oral daily  amLODIPine   Tablet 5 milliGRAM(s) Oral at bedtime  calcium carbonate  625 mG + Vitamin D (OsCal 250 + D) 1 Tablet(s) Oral two times a day  furosemide    Tablet 40 milliGRAM(s) Oral daily  influenza   Vaccine 0.5 milliLiter(s) IntraMuscular once    MEDICATIONS  (PRN):  acetaminophen   Tablet 650 milliGRAM(s) Oral every 6 hours PRN For Temp greater than 38 C (100.4 F), mild pain  ondansetron Injectable 4 milliGRAM(s) IV Push every 6 hours PRN Nausea  senna 2 Tablet(s) Oral at bedtime PRN Constipation  dextrose Gel 1 Dose(s) Oral once PRN Blood Glucose LESS THAN 70 milliGRAM(s)/deciliter  glucagon  Injectable 1 milliGRAM(s) IntraMuscular once PRN Glucose LESS THAN 70 milligrams/deciliter      Allergies    latex (Unknown)  No Known Drug Allergies    Intolerances        REVIEW OF SYSTEMS:    CONSTITUTIONAL: No weakness, fevers or chills  RESPIRATORY: No cough, wheezing, hemoptysis; No shortness of breath  CARDIOVASCULAR: No chest pain or palpitations  GASTROINTESTINAL: No abdominal or epigastric pain. No nausea, vomiting, or hematemesis; No diarrhea or constipation. No melena or hematochezia.  All other review of systems is negative unless indicated above.    Vital Signs Last 24 Hrs  T(C): 36.9 (17 Sep 2017 05:16), Max: 37.4 (16 Sep 2017 20:18)  T(F): 98.4 (17 Sep 2017 05:16), Max: 99.3 (16 Sep 2017 20:18)  HR: 98 (17 Sep 2017 05:16) (85 - 98)  BP: 146/71 (17 Sep 2017 05:16) (146/71 - 160/76)  BP(mean): --  RR: 18 (17 Sep 2017 05:16) (16 - 18)  SpO2: 98% (17 Sep 2017 05:16) (97% - 98%)    PHYSICAL EXAM:    Constitutional: NAD, well-developed  Respiratory: CTAB  Cardiovascular: S1 and S2, RRR, no M/G/R  Gastrointestinal: BS+, soft, NT/ND    LABS:                        8.2    6.5   )-----------( 226      ( 16 Sep 2017 05:52 )             24.7     09-16    138  |  107  |  18  ----------------------------<  67<L>  3.7   |  22  |  1.75<H>    Ca    8.2<L>      16 Sep 2017 05:52            RADIOLOGY & ADDITIONAL STUDIES:

## 2017-09-17 NOTE — PROGRESS NOTE ADULT - SUBJECTIVE AND OBJECTIVE BOX
COLEMAN CARRANZA  MRN: [id number]    S: 9/17: Feels much better. Tolerating her diet. NO c/o shortness of breath or chest pain.     PAST MEDICAL & SURGICAL HISTORY:  Pleural effusion  Breast CA  S/P cholecystectomy  H/O: hysterectomy      O: T(C): 36.6 (09-17-17 @ 12:30), Max: 37.4 (09-16-17 @ 20:18)  HR: 91 (09-17-17 @ 12:30) (85 - 98)  BP: 149/77 (09-17-17 @ 12:30) (146/71 - 160/76)  RR: 18 (09-17-17 @ 12:30) (16 - 18)  SpO2: 98% (09-17-17 @ 12:30) (97% - 98%)  Wt(kg): --    PHYSICAL EXAM:      GENERAL: comfortable    NEURO: awake and alert    NECK: no JVD    CHEST: decreased right lower lung field    CARDIAC: RR    EXT: no edema                              9.6    x     )-----------( x        ( 17 Sep 2017 12:30 )             28.7       09-16    138  |  107  |  18  ----------------------------<  67<L>  3.7   |  22  |  1.75<H>    Ca    8.2<L>      16 Sep 2017 05:52        MEDICATIONS  (STANDING):  insulin lispro (HumaLOG) corrective regimen sliding scale   SubCutaneous every 6 hours  dextrose 5%. 1000 milliLiter(s) (50 mL/Hr) IV Continuous <Continuous>  dextrose 50% Injectable 12.5 Gram(s) IV Push once  dextrose 50% Injectable 25 Gram(s) IV Push once  dextrose 50% Injectable 25 Gram(s) IV Push once  simvastatin 10 milliGRAM(s) Oral at bedtime  methimazole 10 milliGRAM(s) Oral daily  amLODIPine   Tablet 5 milliGRAM(s) Oral at bedtime  calcium carbonate  625 mG + Vitamin D (OsCal 250 + D) 1 Tablet(s) Oral two times a day  furosemide    Tablet 40 milliGRAM(s) Oral daily  influenza   Vaccine 0.5 milliLiter(s) IntraMuscular once  pantoprazole  Injectable 40 milliGRAM(s) IV Push two times a day    MEDICATIONS  (PRN):  acetaminophen   Tablet 650 milliGRAM(s) Oral every 6 hours PRN For Temp greater than 38 C (100.4 F), mild pain  ondansetron Injectable 4 milliGRAM(s) IV Push every 6 hours PRN Nausea  senna 2 Tablet(s) Oral at bedtime PRN Constipation  dextrose Gel 1 Dose(s) Oral once PRN Blood Glucose LESS THAN 70 milliGRAM(s)/deciliter  glucagon  Injectable 1 milliGRAM(s) IntraMuscular once PRN Glucose LESS THAN 70 milligrams/deciliter        A/P: 1. Right pleural effusion. ? etiology -?CHF vs. malignant effusion - no evidence to suggest infection. Thoracentesis done earlier this year reportedly did not suggest a malignant etiology; presumed CHF. Will follow up CXR. If effusion persists would consider diagnostic thoracentesis if patient agrees. She is asymptomatic and indicates that she may prefer to defer thoracentesis with plan for follow up CXR as an outpatient.     2. GI bleed/anemia. H/H stable. Await gastric biopsy result. Tolerating diet.    3. Hx of diastolic CHF. Continue present treatment. NO evidence of acute decompensation.     D/W Dr. Brown. Dr. Suresh will follow up in am.

## 2017-09-17 NOTE — PROGRESS NOTE ADULT - SUBJECTIVE AND OBJECTIVE BOX
History of Present Illness: 	  66 y.o. female with PMH breast ca s/p lumpectomies, LN dissections on chemo with L lymphedema, C2 fusion for pathologic fx, diastolic CHF, HTN, HLD, DM2, hx R pleural effusion, hx benign thyroid nodules, hyperthyroidism presents with abdominal pain. Pt states the abdominal pain started 2 weeks ago, and every time pt eats, she vomits. Pt states decreased po intake. Denies fever, chills, CP, palpitations, SOB. The abd pain is epigastric and L and R upper abdominal pain, improves with pain med and PPI. States burning sensation. Pt has LH and dizziness with change in positions. Pt denies blood in stool, but does report "dark" stool. Pt last chemo few months ago.    9/14- feeling ok, no abdominal pain at present but c/o discomfort w/ meals.   No n/v/d.  No sob, cp, palp or cough.    9/15- egd this morning- discussed w/ son over phone and patient.  Denies abd pain at present while resting but does have some abd pain when ambulating.  No n/v. No BM.  Denies cp, sob, palp.   9/16- abd discomfort overnight relieved w/ percocet x 1.   Feeling well this morning, no pain, no n/v.  No BM.  Hungry.      9/17- no abdominal pain.  Had large BM overnight- dark.  Tolerating clears- no n/v/d. Discussed w/ pt and son.   Discussed possible thoracentesis tomorrow - pt uncertain if she wants- denies cp, sob.  SaO2 100% on room air.    ros-as per hpi    Vital Signs Last 24 Hrs  T(C): 36.9 (17 Sep 2017 05:16), Max: 37.4 (16 Sep 2017 20:18)  T(F): 98.4 (17 Sep 2017 05:16), Max: 99.3 (16 Sep 2017 20:18)  HR: 98 (17 Sep 2017 05:16) (85 - 98)  BP: 146/71 (17 Sep 2017 05:16) (146/71 - 160/76)  BP(mean): --  RR: 18 (17 Sep 2017 05:16) (16 - 18)  SpO2: 98% (17 Sep 2017 05:16) (97% - 98%)    PHYSICAL EXAM:  General: NAD, comfortably lying flat in bed  Neuro: AAOx3, no focal deficits  HEENT: NCAT, PERRLA, EOMI,   Neck: Soft and supple, No JVD  Respiratory: decreased right lung sounds at bases  Cardiovascular: S1 and S2, RRR, no m/g/r  Gastrointestinal: +BS, soft, nondistended, minimal ttp, no rebound/guarding  Extremities: No c/c/e  Vascular: 2+ peripheral pulses  Musculoskeletal: 5/5 strength b/l UE and LE, sensation intact  Skin: No rashes          LABS: All Labs Reviewed:                        8.2    6.5   )-----------( 226      ( 16 Sep 2017 05:52 )             24.7     09-16    138  |  107  |  18  ----------------------------<  67<L>  3.7   |  22  |  1.75<H>    Ca    8.2<L>      16 Sep 2017 05:52                             8.2    6.5   )-----------( 226      ( 16 Sep 2017 05:52 )             24.7     09-16    138  |  107  |  18  ----------------------------<  67<L>  3.7   |  22  |  1.75<H>    Ca    8.2<L>      16 Sep 2017 05:52    Culture - Urine (09.13.17 @ 15:34)    Specimen Source: .Urine Clean Catch (Midstream)    Culture Results:   <10,000 CFU/ml of other organisms                 CARDIAC MARKERS ( 13 Sep 2017 15:34 )  <0.015 ng/mL / x     / x     / x     / x        < from: CT Abdomen and Pelvis w/ Oral Cont (09.13.17 @ 18:41) >  IMPRESSION:   Moderate free fluid is seen within the pelvis. Gastric wall   thickening suggestive of gastritis. Moderate RIGHT pleural effusion.          < end of copied text >    < from: Xray Chest 1 View AP/PA. (09.16.17 @ 09:02) >  AP radiograph of the chest demonstrates moderate RIGHT pleural effusion   with underlying atelectasis or infiltrate. The cardiac silhouette is   normal in size. Osseous structures are intact.    Impression:moderate RIGHT pleural effusion with underlying atelectasis or   infiltrate        < end of copied text >      < from: Upper Endoscopy (09.15.17 @ 07:04) >   A medium amount of food (residue) was found in the gastric body.    FINDING      Diffuse mildly erythematous mucosa without bleeding was found in the     FINDING      gastric body. Biopsies were taken with a cold forceps for histology.    FINDING      distal G body/antrum with obstruction and likely ulceration, bx done    FINDING      bx and eval limited due to food in stomach and pt with [place] and     FINDING      hypoxemia, resolved with removing scope and bagging pt briefly    COMPLIC Complications:       Arrhythmia, Hypoxia    IMPRESS Impression:          - A medium amount of food (residue) in the stomach.    IMPRESS                      - Erythematous mucosa in the gastric body.Biopsied.    ENDORECOMMENDATION Recommendation:      - Await pathology results.    ENDORECOMMENDATION                      - NPO.    ENDORECOMMENDATION                      - Continue present medications.    ENDORECOMMENDATION                      limited eval due to above    < end of copied text >          MEDICATIONS  (STANDING):  pantoprazole Infusion 8 mG/Hr (10 mL/Hr) IV Continuous <Continuous>  insulin lispro (HumaLOG) corrective regimen sliding scale   SubCutaneous every 6 hours  dextrose 5%. 1000 milliLiter(s) (50 mL/Hr) IV Continuous <Continuous>  dextrose 50% Injectable 12.5 Gram(s) IV Push once  dextrose 50% Injectable 25 Gram(s) IV Push once  dextrose 50% Injectable 25 Gram(s) IV Push once  simvastatin 10 milliGRAM(s) Oral at bedtime  methimazole 10 milliGRAM(s) Oral daily  amLODIPine   Tablet 5 milliGRAM(s) Oral at bedtime  calcium carbonate  625 mG + Vitamin D (OsCal 250 + D) 1 Tablet(s) Oral two times a day  furosemide    Tablet 40 milliGRAM(s) Oral daily  influenza   Vaccine 0.5 milliLiter(s) IntraMuscular once    MEDICATIONS  (PRN):  acetaminophen   Tablet 650 milliGRAM(s) Oral every 6 hours PRN For Temp greater than 38 C (100.4 F), mild pain  ondansetron Injectable 4 milliGRAM(s) IV Push every 6 hours PRN Nausea  senna 2 Tablet(s) Oral at bedtime PRN Constipation  dextrose Gel 1 Dose(s) Oral once PRN Blood Glucose LESS THAN 70 milliGRAM(s)/deciliter  glucagon  Injectable 1 milliGRAM(s) IntraMuscular once PRN Glucose LESS THAN 70 milligrams/deciliter          Assessment and Plan:   66y female admitted w/     *anemia likely due to GI bleed  EGD 9/15 w/ diffuse mildly erythematous mucosa without bleeding was found in the    gastric body. Biopsies taken.  Distal G body/antrum with obstruction and likely ulceration, bx done  - +fobt  - h/h improved s/p 2uprbc- stable, repeat today 9/17  - gastritis on CT  - PPI drip day 3--> change to IV bid  - GI f/u appreciated   9/17- had BM, advance diet,  repeat h/h,  iron supplement, change to ppi bid,  biopsy results pending    *breast cancer s/p lumpectomies, LN dissections  - sees Dr. Parker outpt [facility]  - Heme/Onc f/u appreciated    *chronic diastolic CHF, right pleural effusion   - good O2 sats on room air, no sob   -  echo 8/2-17- EF 65-70%  - continue home dose lasix  - repeat cxr in am - if not improved, may need thoracentesis (pt had 5 months ago but uncertain of cause)  - Dr. 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

## 2017-09-18 ENCOUNTER — RESULT REVIEW (OUTPATIENT)
Age: 67
End: 2017-09-18

## 2017-09-18 LAB
AMYLASE FLD-CCNC: 28 U/L — SIGNIFICANT CHANGE UP
B PERT IGG+IGM PNL SER: (no result)
COLOR FLD: YELLOW — SIGNIFICANT CHANGE UP
EOSINOPHIL # FLD: 49 % — SIGNIFICANT CHANGE UP
FLUID INTAKE SUBSTANCE CLASS: SIGNIFICANT CHANGE UP
FLUID SEGMENTED GRANULOCYTES: 8 % — SIGNIFICANT CHANGE UP
GLUCOSE FLD-MCNC: 116 MG/DL — SIGNIFICANT CHANGE UP
GRAM STN FLD: SIGNIFICANT CHANGE UP
HCT VFR BLD CALC: 27.2 % — LOW (ref 34.5–45)
HGB BLD-MCNC: 8.6 G/DL — LOW (ref 11.5–15.5)
LDH SERPL L TO P-CCNC: 145 U/L — SIGNIFICANT CHANGE UP
LYMPHOCYTES # FLD: 8 % — SIGNIFICANT CHANGE UP
MONOS+MACROS # FLD: 35 % — SIGNIFICANT CHANGE UP
PH FLD: 7.44 — SIGNIFICANT CHANGE UP
PROT FLD-MCNC: 3.3 G/DL — SIGNIFICANT CHANGE UP
RCV VOL RI: 0 /UL — SIGNIFICANT CHANGE UP (ref 0–5)
SPECIMEN SOURCE FLD: SIGNIFICANT CHANGE UP
SPECIMEN SOURCE FLD: SIGNIFICANT CHANGE UP
SPECIMEN SOURCE: SIGNIFICANT CHANGE UP
TOTAL NUCLEATED CELL COUNT, BODY FLUID: 531 /UL — HIGH (ref 0–5)
TUBE TYPE: SIGNIFICANT CHANGE UP

## 2017-09-18 PROCEDURE — 32555 ASPIRATE PLEURA W/ IMAGING: CPT | Mod: RT

## 2017-09-18 PROCEDURE — 88305 TISSUE EXAM BY PATHOLOGIST: CPT | Mod: 26

## 2017-09-18 PROCEDURE — 88108 CYTOPATH CONCENTRATE TECH: CPT | Mod: 26

## 2017-09-18 PROCEDURE — 71010: CPT | Mod: 26

## 2017-09-18 RX ADMIN — Medication 650 MILLIGRAM(S): at 10:42

## 2017-09-18 RX ADMIN — Medication 650 MILLIGRAM(S): at 22:02

## 2017-09-18 RX ADMIN — SENNA PLUS 2 TABLET(S): 8.6 TABLET ORAL at 22:02

## 2017-09-18 RX ADMIN — PANTOPRAZOLE SODIUM 40 MILLIGRAM(S): 20 TABLET, DELAYED RELEASE ORAL at 05:29

## 2017-09-18 RX ADMIN — Medication 40 MILLIGRAM(S): at 05:29

## 2017-09-18 RX ADMIN — SIMVASTATIN 10 MILLIGRAM(S): 20 TABLET, FILM COATED ORAL at 22:01

## 2017-09-18 RX ADMIN — Medication 1 TABLET(S): at 17:42

## 2017-09-18 RX ADMIN — Medication 1 TABLET(S): at 05:29

## 2017-09-18 RX ADMIN — AMLODIPINE BESYLATE 5 MILLIGRAM(S): 2.5 TABLET ORAL at 22:01

## 2017-09-18 RX ADMIN — PANTOPRAZOLE SODIUM 40 MILLIGRAM(S): 20 TABLET, DELAYED RELEASE ORAL at 17:42

## 2017-09-18 NOTE — PROGRESS NOTE ADULT - SUBJECTIVE AND OBJECTIVE BOX
Patient is a 66y old  Female who presents with a chief complaint of abd pain, dizziness (13 Sep 2017 22:28)      HPI:  66 y.o. female with PMH breast ca s/p lumpectomies, LN dissections on chemo with L lymphedema, C2 fusion for pathologic fx, diastolic CHF, HTN, HLD, DM2, hx R pleural effusion, hx benign thyroid nodules, hyperthyroidism presents with abdominal pain. Pt states the abdominal pain started 2 weeks ago, and every time pt eats, she vomits. Pt states decreased po intake. Denies fever, chills, CP, palpitations, SOB. The abd pain is epigastric and L and R upper abdominal pain, improves with pain med and PPI. States burning sensation. Pt has LH and dizziness with change in positions. Pt denies blood in stool, but does report "dark" stool. Pt last chemo few months ago.    Pt reports wanting to find oncologist that's local as she can't go to [facility] anymore.      Pt comf  pos fatigue and mild upper abd sharp pain that is unchanged with eating  yaz diet   neg N V  pos flatus    PMH: as above  PSMH: hysterectomy, b/l lumpectomies with LN dissections, cholecystectomy, thoracentesis, mediport placement  Family Hx: denies ca hx  Social Hx: denies tobacco, EtOH, drugs  ROS: per HPI (13 Sep 2017 22:28)      PAST MEDICAL & SURGICAL HISTORY:  Pleural effusion  Breast CA  S/P cholecystectomy  H/O: hysterectomy      MEDICATIONS  (STANDING):  insulin lispro (HumaLOG) corrective regimen sliding scale   SubCutaneous every 6 hours  dextrose 5%. 1000 milliLiter(s) (50 mL/Hr) IV Continuous <Continuous>  dextrose 50% Injectable 12.5 Gram(s) IV Push once  dextrose 50% Injectable 25 Gram(s) IV Push once  dextrose 50% Injectable 25 Gram(s) IV Push once  simvastatin 10 milliGRAM(s) Oral at bedtime  methimazole 10 milliGRAM(s) Oral daily  amLODIPine   Tablet 5 milliGRAM(s) Oral at bedtime  calcium carbonate  625 mG + Vitamin D (OsCal 250 + D) 1 Tablet(s) Oral two times a day  furosemide    Tablet 40 milliGRAM(s) Oral daily  influenza   Vaccine 0.5 milliLiter(s) IntraMuscular once  pantoprazole  Injectable 40 milliGRAM(s) IV Push two times a day    MEDICATIONS  (PRN):  acetaminophen   Tablet 650 milliGRAM(s) Oral every 6 hours PRN For Temp greater than 38 C (100.4 F), mild pain  ondansetron Injectable 4 milliGRAM(s) IV Push every 6 hours PRN Nausea  senna 2 Tablet(s) Oral at bedtime PRN Constipation  dextrose Gel 1 Dose(s) Oral once PRN Blood Glucose LESS THAN 70 milliGRAM(s)/deciliter  glucagon  Injectable 1 milliGRAM(s) IntraMuscular once PRN Glucose LESS THAN 70 milligrams/deciliter      Allergies    latex (Unknown)  No Known Drug Allergies    Intolerances        SOCIAL HISTORY:NC    FAMILY HISTORY:NC      REVIEW OF SYSTEMS:    CONSTITUTIONAL: No weakness, fevers or chills  EYES/ENT: No visual changes;  No vertigo or throat pain   NECK: No pain or stiffness  RESPIRATORY: No cough, wheezing, hemoptysis; No shortness of breath  CARDIOVASCULAR: No chest pain or palpitations  GENITOURINARY: No dysuria, frequency or hematuria  NEUROLOGICAL: No numbness or weakness  SKIN: No itching, burning, rashes, or lesions   All other review of systems is negative unless indicated above.    Vital Signs Last 24 Hrs  T(C): 37.1 (18 Sep 2017 04:55), Max: 37.1 (18 Sep 2017 04:55)  T(F): 98.8 (18 Sep 2017 04:55), Max: 98.8 (18 Sep 2017 04:55)  HR: 86 (18 Sep 2017 04:55) (79 - 91)  BP: 142/75 (18 Sep 2017 04:55) (142/75 - 165/77)  BP(mean): --  RR: 16 (18 Sep 2017 04:55) (16 - 18)  SpO2: 98% (18 Sep 2017 04:55) (97% - 98%)    PHYSICAL EXAM:    Constitutional: NAD, well-developed  HEENT: EOMI, throat clear  Neck: No LAD, supple  Respiratory: CTA and P  Cardiovascular: S1 and S2, RRR, no M  Gastrointestinal: BS+, soft, NT/ND, neg HSM,  Extremities: No peripheral edema, neg clubing, cyanosis  Vascular: 2+ peripheral pulses  Neurological: A/O x 3, no focal deficits  Psychiatric: Normal mood, normal affect  Skin: No rashes    LABS:  CBC Full  -  ( 17 Sep 2017 12:30 )  WBC Count : x  Hemoglobin : 9.6 g/dL  Hematocrit : 28.7 %  Platelet Count - Automated : x  Mean Cell Volume : x  Mean Cell Hemoglobin : x  Mean Cell Hemoglobin Concentration : x  Auto Neutrophil # : x  Auto Lymphocyte # : x  Auto Monocyte # : x  Auto Eosinophil # : x  Auto Basophil # : x  Auto Neutrophil % : x  Auto Lymphocyte % : x  Auto Monocyte % : x  Auto Eosinophil % : x  Auto Basophil % : x                  RADIOLOGY & ADDITIONAL STUDIES:  < from: Xray Chest 1 View AP/PA. (09.16.17 @ 09:02) >  EXAM:  CHEST SINGLE VIEW FRONTAL                            PROCEDURE DATE:  09/16/2017          INTERPRETATION:  History: Dyspnea     Chest:  one view.      Comparison: 9/13/2017    AP radiograph of the chest demonstrates moderate RIGHT pleural effusion   with underlying atelectasis or infiltrate. The cardiac silhouette is   normal in size. Osseous structures are intact.    Impression:moderate RIGHT pleural effusion with underlying atelectasis or   infiltrate          < end of copied text >

## 2017-09-18 NOTE — PROGRESS NOTE ADULT - SUBJECTIVE AND OBJECTIVE BOX
NEPHROLOGY INTERVAL HPI/OVERNIGHT EVENTS:  9/18 SY  S/p Thoracentesis.  Tolerated procedure well.  SOB stable.    HPI:  66 y.o. female with PMH breast ca s/p lumpectomies, LN dissections on chemo with L lymphedema, C2 fusion for pathologic fx, diastolic CHF, HTN, HLD, DM2, hx R pleural effusion, hx benign thyroid nodules, hyperthyroidism presents with abdominal pain. Pt states the abdominal pain started 2 weeks ago, and every time pt eats, she vomits. Pt states decreased po intake. Denies fever, chills, CP, palpitations, SOB. The abd pain is epigastric and L and R upper abdominal pain, improves with pain med and PPI. States burning sensation. Pt has LH and dizziness with change in positions. Pt denies blood in stool, but does report "dark" stool. Pt last chemo few months ago.  Pt reports wanting to find oncologist that's local as she can't go to [facility] anymore.  Pt was admitted in August with elevated creat 1.8 -1.9 range  Never evaluated by Nephrology before..  Is aware of the fact that has renal issues, but not the etiology      MEDICATIONS  (STANDING):  insulin lispro (HumaLOG) corrective regimen sliding scale   SubCutaneous every 6 hours  dextrose 5%. 1000 milliLiter(s) (50 mL/Hr) IV Continuous <Continuous>  dextrose 50% Injectable 12.5 Gram(s) IV Push once  dextrose 50% Injectable 25 Gram(s) IV Push once  dextrose 50% Injectable 25 Gram(s) IV Push once  simvastatin 10 milliGRAM(s) Oral at bedtime  methimazole 10 milliGRAM(s) Oral daily  amLODIPine   Tablet 5 milliGRAM(s) Oral at bedtime  calcium carbonate  625 mG + Vitamin D (OsCal 250 + D) 1 Tablet(s) Oral two times a day  furosemide    Tablet 40 milliGRAM(s) Oral daily  influenza   Vaccine 0.5 milliLiter(s) IntraMuscular once  pantoprazole  Injectable 40 milliGRAM(s) IV Push two times a day    MEDICATIONS  (PRN):  acetaminophen   Tablet 650 milliGRAM(s) Oral every 6 hours PRN For Temp greater than 38 C (100.4 F), mild pain  ondansetron Injectable 4 milliGRAM(s) IV Push every 6 hours PRN Nausea  senna 2 Tablet(s) Oral at bedtime PRN Constipation  dextrose Gel 1 Dose(s) Oral once PRN Blood Glucose LESS THAN 70 milliGRAM(s)/deciliter  glucagon  Injectable 1 milliGRAM(s) IntraMuscular once PRN Glucose LESS THAN 70 milligrams/deciliter          Vital Signs Last 24 Hrs  T(C): 37 (18 Sep 2017 14:26), Max: 37.1 (18 Sep 2017 04:55)  T(F): 98.6 (18 Sep 2017 14:26), Max: 98.8 (18 Sep 2017 04:55)  HR: 92 (18 Sep 2017 14:26) (79 - 92)  BP: 150/95 (18 Sep 2017 14:26) (142/75 - 165/77)  BP(mean): --  RR: 16 (18 Sep 2017 14:26) (16 - 18)  SpO2: 99% (18 Sep 2017 14:26) (97% - 99%)  Daily     Daily       PHYSICAL EXAM:  Alert and appropriate  GENERAL: no apparent distress  CHEST/LUNG: decreased BS at bases  HEART: S1S2 RRR  ABDOMEN: soft  EXTREMITIES: no LE edema  SKIN:     LABS:                        8.6    x     )-----------( x        ( 18 Sep 2017 05:45 )             27.2                       RADIOLOGY & ADDITIONAL TESTS:

## 2017-09-18 NOTE — BRIEF OPERATIVE NOTE - PROCEDURE
<<-----Click on this checkbox to enter Procedure Thoracentesis  09/18/2017  Performed with US guidance by Dr. Schroeder in [facility]

## 2017-09-18 NOTE — PROGRESS NOTE ADULT - SUBJECTIVE AND OBJECTIVE BOX
COLEMAN CARRANZA  MRN: [id number]    S: 9/18: patient has thoracentesis today and feeling better with the sob with no cough or wheezing     PAST MEDICAL & SURGICAL HISTORY:  Pleural effusion  Breast CA  S/P cholecystectomy  H/O: hysterectomy      Vital Signs Last 24 Hrs  T(C): 37.1 (18 Sep 2017 04:55), Max: 37.1 (18 Sep 2017 04:55)  T(F): 98.8 (18 Sep 2017 04:55), Max: 98.8 (18 Sep 2017 04:55)  HR: 86 (18 Sep 2017 04:55) (79 - 86)  BP: 142/75 (18 Sep 2017 04:55) (142/75 - 165/77)  BP(mean): --  RR: 16 (18 Sep 2017 04:55) (16 - 18)  SpO2: 98% (18 Sep 2017 04:55) (97% - 98%)    PHYSICAL EXAM:      GENERAL: comfortable    NEURO: awake and alert    NECK: no JVD    CHEST: improved breath sounds in the right lung following thoracentesis     CARDIAC: RR    EXT: no edema                                 8.6    x     )-----------( x        ( 18 Sep 2017 05:45 )             27.2         MEDICATIONS  (STANDING):  insulin lispro (HumaLOG) corrective regimen sliding scale   SubCutaneous every 6 hours  dextrose 5%. 1000 milliLiter(s) (50 mL/Hr) IV Continuous <Continuous>  dextrose 50% Injectable 12.5 Gram(s) IV Push once  dextrose 50% Injectable 25 Gram(s) IV Push once  dextrose 50% Injectable 25 Gram(s) IV Push once  simvastatin 10 milliGRAM(s) Oral at bedtime  methimazole 10 milliGRAM(s) Oral daily  amLODIPine   Tablet 5 milliGRAM(s) Oral at bedtime  calcium carbonate  625 mG + Vitamin D (OsCal 250 + D) 1 Tablet(s) Oral two times a day  furosemide    Tablet 40 milliGRAM(s) Oral daily  influenza   Vaccine 0.5 milliLiter(s) IntraMuscular once  pantoprazole  Injectable 40 milliGRAM(s) IV Push two times a day        A/P: 1. Right pleural effusion. ? etiology -?CHF vs. malignant effusion status post thoracentesis and need to follow up cytology and chemical analysis of the effusion     2. GI bleed/anemia. H/H stable. Await gastric biopsy result.     3. Hx of diastolic CHF. Continue present treatment. NO evidence of acute decompensation.     patient condition was discussed with the daughter at bed side

## 2017-09-18 NOTE — PROGRESS NOTE ADULT - SUBJECTIVE AND OBJECTIVE BOX
History of Present Illness: 	  66 y.o. female with PMH breast ca s/p lumpectomies, LN dissections on chemo with L lymphedema, C2 fusion for pathologic fx, diastolic CHF, HTN, HLD, DM2, hx R pleural effusion, hx benign thyroid nodules, hyperthyroidism presents with abdominal pain. Pt states the abdominal pain started 2 weeks ago, and every time pt eats, she vomits. Pt states decreased po intake. Denies fever, chills, CP, palpitations, SOB. The abd pain is epigastric and L and R upper abdominal pain, improves with pain med and PPI. States burning sensation. Pt has LH and dizziness with change in positions. Pt denies blood in stool, but does report "dark" stool. Pt last chemo few months ago.    - feeling ok, no abdominal pain at present but c/o discomfort w/ meals.   No n/v/d.  No sob, cp, palp or cough.    9/15- egd this morning- discussed w/ son over phone and patient.  Denies abd pain at present while resting but does have some abd pain when ambulating.  No n/v. No BM.  Denies cp, sob, palp.   - abd discomfort overnight relieved w/ percocet x 1.   Feeling well this morning, no pain, no n/v.  No BM.  Hungry.    - no abdominal pain.  Had large BM overnight- dark.  Tolerating clears- no n/v/d. Discussed w/ pt and son.   Discussed possible thoracentesis tomorrow - pt uncertain if she wants- denies cp, sob.  SaO2 100% on room air.    - intermittent abd discomfort-     ros-as per hpi    Vital Signs Last 24 Hrs  T(C): 37.1 (18 Sep 2017 04:55), Max: 37.1 (18 Sep 2017 04:55)  T(F): 98.8 (18 Sep 2017 04:55), Max: 98.8 (18 Sep 2017 04:55)  HR: 86 (18 Sep 2017 04:55) (79 - 91)  BP: 142/75 (18 Sep 2017 04:55) (142/75 - 165/77)  BP(mean): --  RR: 16 (18 Sep 2017 04:55) (16 - 18)  SpO2: 98% (18 Sep 2017 04:55) (97% - 98%)    PHYSICAL EXAM:  General: NAD, comfortably lying flat in bed  Neuro: AAOx3, no focal deficits  HEENT: NCAT, PERRLA, EOMI,   Neck: Soft and supple, No JVD  Respiratory: decreased right lung sounds at bases  Cardiovascular: S1 and S2, RRR, no m/g/r  Gastrointestinal: +BS, soft, nondistended, minimal ttp, no rebound/guarding  Extremities: No c/c/e  Vascular: 2+ peripheral pulses  Musculoskeletal: 5/5 strength b/l UE and LE, sensation intact  Skin: No rashes            LABS: All Labs Reviewed:                        8.6    x     )-----------( x        ( 18 Sep 2017 05:45 )             27.2           Blood Culture:     RADIOLOGY/EK.2    6.5   )-----------( 226      ( 16 Sep 2017 05:52 )             24.7         138  |  107  |  18  ----------------------------<  67<L>  3.7   |  22  |  1.75<H>    Ca    8.2<L>      16 Sep 2017 05:52                             8.2    6.5   )-----------( 226      ( 16 Sep 2017 05:52 )             24.7     -    138  |  107  |  18  ----------------------------<  67<L>  3.7   |  22  |  1.75<H>    Ca    8.2<L>      16 Sep 2017 05:52    Culture - Urine (17 @ 15:34)    Specimen Source: .Urine Clean Catch (Midstream)    Culture Results:   <10,000 CFU/ml of other organisms                 CARDIAC MARKERS ( 13 Sep 2017 15:34 )  <0.015 ng/mL / x     / x     / x     / x        < from: CT Abdomen and Pelvis w/ Oral Cont (17 @ 18:41) >  IMPRESSION:   Moderate free fluid is seen within the pelvis. Gastric wall   thickening suggestive of gastritis. Moderate RIGHT pleural effusion.          < end of copied text >    < from: Xray Chest 1 View AP/PA. (17 @ 09:02) >  AP radiograph of the chest demonstrates moderate RIGHT pleural effusion   with underlying atelectasis or infiltrate. The cardiac silhouette is   normal in size. Osseous structures are intact.    Impression:moderate RIGHT pleural effusion with underlying atelectasis or   infiltrate        < end of copied text >      < from: Upper Endoscopy (09.15.17 @ 07:04) >   A medium amount of food (residue) was found in the gastric body.    FINDING      Diffuse mildly erythematous mucosa without bleeding was found in the     FINDING      gastric body. Biopsies were taken with a cold forceps for histology.    FINDING      distal G body/antrum with obstruction and likely ulceration, bx done    FINDING      bx and eval limited due to food in stomach and pt with austin cardia and     FINDING      hypoxemia, resolved with removing scope and bagging pt briefly    COMPLIC Complications:       Arrhythmia, Hypoxia    IMPRESS Impression:          - A medium amount of food (residue) in the stomach.    IMPRESS                      - Erythematous mucosa in the gastric body.Biopsied.    ENDORECOMMENDATION Recommendation:      - Await pathology results.    ENDORECOMMENDATION                      - NPO.    ENDORECOMMENDATION                      - Continue present medications.    ENDORECOMMENDATION                      limited eval due to above    < end of copied text >          MEDICATIONS  (STANDING):  insulin lispro (HumaLOG) corrective regimen sliding scale   SubCutaneous every 6 hours  dextrose 5%. 1000 milliLiter(s) (50 mL/Hr) IV Continuous <Continuous>  dextrose 50% Injectable 12.5 Gram(s) IV Push once  dextrose 50% Injectable 25 Gram(s) IV Push once  dextrose 50% Injectable 25 Gram(s) IV Push once  simvastatin 10 milliGRAM(s) Oral at bedtime  methimazole 10 milliGRAM(s) Oral daily  amLODIPine   Tablet 5 milliGRAM(s) Oral at bedtime  calcium carbonate  625 mG + Vitamin D (OsCal 250 + D) 1 Tablet(s) Oral two times a day  furosemide    Tablet 40 milliGRAM(s) Oral daily  influenza   Vaccine 0.5 milliLiter(s) IntraMuscular once  pantoprazole  Injectable 40 milliGRAM(s) IV Push two times a day    MEDICATIONS  (PRN):  acetaminophen   Tablet 650 milliGRAM(s) Oral every 6 hours PRN For Temp greater than 38 C (100.4 F), mild pain  ondansetron Injectable 4 milliGRAM(s) IV Push every 6 hours PRN Nausea  senna 2 Tablet(s) Oral at bedtime PRN Constipation  dextrose Gel 1 Dose(s) Oral once PRN Blood Glucose LESS THAN 70 milliGRAM(s)/deciliter  glucagon  Injectable 1 milliGRAM(s) IntraMuscular once PRN Glucose LESS THAN 70 milligrams/deciliter          Assessment and Plan:   66y female admitted w/     *anemia due to GI bleed, PUD   - gastritis on CT  - EGD 9/15 w/ diffuse mildly erythematous mucosa without bleeding was found in the    gastric body. Biopsies taken.  Distal G body/antrum with obstruction and likely ulceration, bx done  - +fobt  - h/h improved s/p 2uprbc- stable  - PPI drip day 3--> change to IV bid   - GI f/u appreciated   - had BM, advance diet,  repeat h/h,  iron supplement, change to ppi bid,  biopsy results pending    *breast cancer s/p lumpectomies, LN dissections, LUE lymphedema  - sees Dr. Parker outpt [facility]  - Heme/Onc f/u appreciated  - pt wants to establish care locally,  Heme/Onc to f/u  regarding plan    *chronic diastolic CHF, right pleural effusion   - good O2 sats on room air, no sob   -  echo -- EF 65-70%  - continue home dose lasix  - repeat cxr still effusion, IR for thoracentesis diagnostic  given hx breast ca (had thoracentesis 5 mo. ago but pt/family dont think due to malignancy)  - Pulm , IR f/u appreciated     *IZA on CKD3-4  - Cr improved, monitor on lasix   - ACEi held  - Nephro f/u appreciated     *T2D  - hold metformin  - ISS, diabetic diet    *hyperthyroidism  - continue methimazole    *dvt px   - scds History of Present Illness: 	  66 y.o. female with PMH breast ca s/p lumpectomies, LN dissections on chemo with L lymphedema, C2 fusion for pathologic fx, diastolic CHF, HTN, HLD, DM2, hx R pleural effusion, hx benign thyroid nodules, hyperthyroidism presents with abdominal pain. Pt states the abdominal pain started 2 weeks ago, and every time pt eats, she vomits. Pt states decreased po intake. Denies fever, chills, CP, palpitations, SOB. The abd pain is epigastric and L and R upper abdominal pain, improves with pain med and PPI. States burning sensation. Pt has LH and dizziness with change in positions. Pt denies blood in stool, but does report "dark" stool. Pt last chemo few months ago.    - feeling ok, no abdominal pain at present but c/o discomfort w/ meals.   No n/v/d.  No sob, cp, palp or cough.    9/15- egd this morning- discussed w/ son over phone and patient.  Denies abd pain at present while resting but does have some abd pain when ambulating.  No n/v. No BM.  Denies cp, sob, palp.   - abd discomfort overnight relieved w/ percocet x 1.   Feeling well this morning, no pain, no n/v.  No BM.  Hungry.    - no abdominal pain.  Had large BM overnight- dark.  Tolerating clears- no n/v/d. Discussed w/ pt and son.   Discussed possible thoracentesis tomorrow - pt uncertain if she wants- denies cp, sob.  SaO2 100% on room air.    - intermittent abd discomfort.  Tolerating full liquids.  Thoracentesis today.   Awaiting biopsy results.      ros-as per hpi    Vital Signs Last 24 Hrs  T(C): 37.1 (18 Sep 2017 04:55), Max: 37.1 (18 Sep 2017 04:55)  T(F): 98.8 (18 Sep 2017 04:55), Max: 98.8 (18 Sep 2017 04:55)  HR: 86 (18 Sep 2017 04:55) (79 - 91)  BP: 142/75 (18 Sep 2017 04:55) (142/75 - 165/77)  BP(mean): --  RR: 16 (18 Sep 2017 04:55) (16 - 18)  SpO2: 98% (18 Sep 2017 04:55) (97% - 98%)    PHYSICAL EXAM:  General: NAD, seated comfortably at bedside, nad, good O2 sats on room air  Neuro: AAOx3, no focal deficits  HEENT: NCAT, PERRLA, EOMI,   Neck: Soft and supple, No JVD  Respiratory: decreased right lung sounds at bases  Cardiovascular: S1 and S2, RRR, no m/g/r  Gastrointestinal: +BS, soft, nondistended, minimal ttp, no rebound/guarding  Extremities: LUE lymphedema (w/ compression sleeve),  no LE c/c/e  Vascular: 2+ peripheral pulses  Musculoskeletal: 5/5 strength b/l UE and LE, sensation intact  Skin: No rashes            LABS: All Labs Reviewed:                        8.6    x     )-----------( x        ( 18 Sep 2017 05:45 )             27.2           Blood Culture:     RADIOLOGY/EK.2    6.5   )-----------( 226      ( 16 Sep 2017 05:52 )             24.7     -    138  |  107  |  18  ----------------------------<  67<L>  3.7   |  22  |  1.75<H>    Ca    8.2<L>      16 Sep 2017 05:52                             8.2    6.5   )-----------( 226      ( 16 Sep 2017 05:52 )             24.7     -    138  |  107  |  18  ----------------------------<  67<L>  3.7   |  22  |  1.75<H>    Ca    8.2<L>      16 Sep 2017 05:52    Culture - Urine (17 @ 15:34)    Specimen Source: .Urine Clean Catch (Midstream)    Culture Results:   <10,000 CFU/ml of other organisms                 CARDIAC MARKERS ( 13 Sep 2017 15:34 )  <0.015 ng/mL / x     / x     / x     / x        < from: CT Abdomen and Pelvis w/ Oral Cont (17 @ 18:41) >  IMPRESSION:   Moderate free fluid is seen within the pelvis. Gastric wall   thickening suggestive of gastritis. Moderate RIGHT pleural effusion.          < end of copied text >    < from: Xray Chest 1 View AP/PA. (17 @ 09:02) >  AP radiograph of the chest demonstrates moderate RIGHT pleural effusion   with underlying atelectasis or infiltrate. The cardiac silhouette is   normal in size. Osseous structures are intact.    Impression:moderate RIGHT pleural effusion with underlying atelectasis or   infiltrate        < end of copied text >      < from: Upper Endoscopy (09.15.17 @ 07:04) >   A medium amount of food (residue) was found in the gastric body.    FINDING      Diffuse mildly erythematous mucosa without bleeding was found in the     FINDING      gastric body. Biopsies were taken with a cold forceps for histology.    FINDING      distal G body/antrum with obstruction and likely ulceration, bx done    FINDING      bx and eval limited due to food in stomach and pt with austin cardia and     FINDING      hypoxemia, resolved with removing scope and bagging pt briefly    COMPLIC Complications:       Arrhythmia, Hypoxia    IMPRESS Impression:          - A medium amount of food (residue) in the stomach.    IMPRESS                      - Erythematous mucosa in the gastric body.Biopsied.    ENDORECOMMENDATION Recommendation:      - Await pathology results.    ENDORECOMMENDATION                      - NPO.    ENDORECOMMENDATION                      - Continue present medications.    ENDORECOMMENDATION                      limited eval due to above    < end of copied text >          MEDICATIONS  (STANDING):  insulin lispro (HumaLOG) corrective regimen sliding scale   SubCutaneous every 6 hours  dextrose 5%. 1000 milliLiter(s) (50 mL/Hr) IV Continuous <Continuous>  dextrose 50% Injectable 12.5 Gram(s) IV Push once  dextrose 50% Injectable 25 Gram(s) IV Push once  dextrose 50% Injectable 25 Gram(s) IV Push once  simvastatin 10 milliGRAM(s) Oral at bedtime  methimazole 10 milliGRAM(s) Oral daily  amLODIPine   Tablet 5 milliGRAM(s) Oral at bedtime  calcium carbonate  625 mG + Vitamin D (OsCal 250 + D) 1 Tablet(s) Oral two times a day  furosemide    Tablet 40 milliGRAM(s) Oral daily  influenza   Vaccine 0.5 milliLiter(s) IntraMuscular once  pantoprazole  Injectable 40 milliGRAM(s) IV Push two times a day    MEDICATIONS  (PRN):  acetaminophen   Tablet 650 milliGRAM(s) Oral every 6 hours PRN For Temp greater than 38 C (100.4 F), mild pain  ondansetron Injectable 4 milliGRAM(s) IV Push every 6 hours PRN Nausea  senna 2 Tablet(s) Oral at bedtime PRN Constipation  dextrose Gel 1 Dose(s) Oral once PRN Blood Glucose LESS THAN 70 milliGRAM(s)/deciliter  glucagon  Injectable 1 milliGRAM(s) IntraMuscular once PRN Glucose LESS THAN 70 milligrams/deciliter          Assessment and Plan:   66y female admitted w/     *anemia due to GI bleed, PUD   - gastritis on CT  - EGD 9/15 w/ diffuse mildly erythematous mucosa without bleeding was found in the    gastric body. Biopsies taken.  Distal G body/antrum with obstruction and likely ulceration, bx done  - +fobt  - h/h improved s/p 2uprbc- stable  - PPI drip day 3--> change to IV bid   - GI f/u appreciated   - had BM, advance diet,  repeat h/h,  iron supplement, change to ppi bid,  biopsy results pending    *breast cancer s/p lumpectomies, LN dissections, LUE lymphedema  - sees Dr. Parker outpt [facility]  - Heme/Onc f/u appreciated  - pt wants to establish care locally,  Heme/Onc to f/u  regarding plan    *chronic diastolic CHF, right pleural effusion   - good O2 sats on room air, no sob   -  echo -- EF 65-70%  - continue home dose lasix  - repeat cxr still effusion, IR for thoracentesis diagnostic  given hx breast ca (had thoracentesis 5 mo. ago but pt/family dont think due to malignancy)  - Pulm , IR f/u appreciated     *IZA on CKD3-4  - Cr improved, monitor on lasix   - ACEi held  - Nephro f/u appreciated     *T2D  - hold metformin  - ISS, diabetic diet    *hyperthyroidism  - continue methimazole    *dvt px   - scds       - thoracentesis today, r/o malignant effusion. Heme/Onc to follow up re: breast cancer management (pt wants to establish care locally;  last saw Dr. Parker 2months ago at [facility] for tx but felt too weak to return).   Gastric biopsy results pending.   Monitor h/h.    From home w/ daughter.

## 2017-09-19 LAB
ANION GAP SERPL CALC-SCNC: 7 MMOL/L — SIGNIFICANT CHANGE UP (ref 5–17)
BUN SERPL-MCNC: 16 MG/DL — SIGNIFICANT CHANGE UP (ref 7–23)
CALCIUM SERPL-MCNC: 8.3 MG/DL — LOW (ref 8.5–10.1)
CHLORIDE SERPL-SCNC: 101 MMOL/L — SIGNIFICANT CHANGE UP (ref 96–108)
CO2 SERPL-SCNC: 27 MMOL/L — SIGNIFICANT CHANGE UP (ref 22–31)
CREAT SERPL-MCNC: 1.84 MG/DL — HIGH (ref 0.5–1.3)
GLUCOSE SERPL-MCNC: 97 MG/DL — SIGNIFICANT CHANGE UP (ref 70–99)
HCT VFR BLD CALC: 26.6 % — LOW (ref 34.5–45)
HGB BLD-MCNC: 9 G/DL — LOW (ref 11.5–15.5)
LDH SERPL L TO P-CCNC: 143 U/L — SIGNIFICANT CHANGE UP (ref 50–242)
NIGHT BLUE STAIN TISS: SIGNIFICANT CHANGE UP
NON-GYN CYTOLOGY SPEC: SIGNIFICANT CHANGE UP
POTASSIUM SERPL-MCNC: 3.7 MMOL/L — SIGNIFICANT CHANGE UP (ref 3.5–5.3)
POTASSIUM SERPL-SCNC: 3.7 MMOL/L — SIGNIFICANT CHANGE UP (ref 3.5–5.3)
PROT SERPL-MCNC: 5.3 GM/DL — LOW (ref 6–8.3)
SODIUM SERPL-SCNC: 135 MMOL/L — SIGNIFICANT CHANGE UP (ref 135–145)
SPECIMEN SOURCE: SIGNIFICANT CHANGE UP

## 2017-09-19 PROCEDURE — 71020: CPT | Mod: 26

## 2017-09-19 RX ORDER — DOCUSATE SODIUM 100 MG
100 CAPSULE ORAL DAILY
Qty: 0 | Refills: 0 | Status: DISCONTINUED | OUTPATIENT
Start: 2017-09-19 | End: 2017-09-20

## 2017-09-19 RX ORDER — POLYETHYLENE GLYCOL 3350 17 G/17G
17 POWDER, FOR SOLUTION ORAL DAILY
Qty: 0 | Refills: 0 | Status: DISCONTINUED | OUTPATIENT
Start: 2017-09-19 | End: 2017-09-20

## 2017-09-19 RX ORDER — OXYCODONE AND ACETAMINOPHEN 5; 325 MG/1; MG/1
1 TABLET ORAL ONCE
Qty: 0 | Refills: 0 | Status: DISCONTINUED | OUTPATIENT
Start: 2017-09-19 | End: 2017-09-19

## 2017-09-19 RX ADMIN — SIMVASTATIN 10 MILLIGRAM(S): 20 TABLET, FILM COATED ORAL at 22:34

## 2017-09-19 RX ADMIN — POLYETHYLENE GLYCOL 3350 17 GRAM(S): 17 POWDER, FOR SOLUTION ORAL at 12:41

## 2017-09-19 RX ADMIN — Medication 1 TABLET(S): at 17:40

## 2017-09-19 RX ADMIN — PANTOPRAZOLE SODIUM 40 MILLIGRAM(S): 20 TABLET, DELAYED RELEASE ORAL at 05:37

## 2017-09-19 RX ADMIN — Medication 40 MILLIGRAM(S): at 05:38

## 2017-09-19 RX ADMIN — OXYCODONE AND ACETAMINOPHEN 1 TABLET(S): 5; 325 TABLET ORAL at 00:47

## 2017-09-19 RX ADMIN — AMLODIPINE BESYLATE 5 MILLIGRAM(S): 2.5 TABLET ORAL at 22:34

## 2017-09-19 RX ADMIN — Medication 100 MILLIGRAM(S): at 12:41

## 2017-09-19 RX ADMIN — Medication 1 TABLET(S): at 05:37

## 2017-09-19 RX ADMIN — OXYCODONE AND ACETAMINOPHEN 1 TABLET(S): 5; 325 TABLET ORAL at 01:47

## 2017-09-19 RX ADMIN — PANTOPRAZOLE SODIUM 40 MILLIGRAM(S): 20 TABLET, DELAYED RELEASE ORAL at 17:40

## 2017-09-19 NOTE — PROGRESS NOTE ADULT - SUBJECTIVE AND OBJECTIVE BOX
NEPHROLOGY INTERVAL HPI/OVERNIGHT EVENTS:  9/19 SY  Feeling a little better today.  Comfortable.    9/18 SY  S/p Thoracentesis.  Tolerated procedure well.  SOB stable.    HPI:  66 y.o. female with PMH breast ca s/p lumpectomies, LN dissections on chemo with L lymphedema, C2 fusion for pathologic fx, diastolic CHF, HTN, HLD, DM2, hx R pleural effusion, hx benign thyroid nodules, hyperthyroidism presents with abdominal pain. Pt states the abdominal pain started 2 weeks ago, and every time pt eats, she vomits. Pt states decreased po intake. Denies fever, chills, CP, palpitations, SOB. The abd pain is epigastric and L and R upper abdominal pain, improves with pain med and PPI. States burning sensation. Pt has LH and dizziness with change in positions. Pt denies blood in stool, but does report "dark" stool. Pt last chemo few months ago.  Pt reports wanting to find oncologist that's local as she can't go to [facility] anymore.  Pt was admitted in August with elevated creat 1.8 -1.9 range  Never evaluated by Nephrology before..  Is aware of the fact that has renal issues, but not the etiology    MEDICATIONS  (STANDING):  insulin lispro (HumaLOG) corrective regimen sliding scale   SubCutaneous every 6 hours  dextrose 5%. 1000 milliLiter(s) (50 mL/Hr) IV Continuous <Continuous>  dextrose 50% Injectable 12.5 Gram(s) IV Push once  dextrose 50% Injectable 25 Gram(s) IV Push once  dextrose 50% Injectable 25 Gram(s) IV Push once  simvastatin 10 milliGRAM(s) Oral at bedtime  methimazole 10 milliGRAM(s) Oral daily  amLODIPine   Tablet 5 milliGRAM(s) Oral at bedtime  calcium carbonate  625 mG + Vitamin D (OsCal 250 + D) 1 Tablet(s) Oral two times a day  furosemide    Tablet 40 milliGRAM(s) Oral daily  influenza   Vaccine 0.5 milliLiter(s) IntraMuscular once  pantoprazole  Injectable 40 milliGRAM(s) IV Push two times a day    MEDICATIONS  (PRN):  acetaminophen   Tablet 650 milliGRAM(s) Oral every 6 hours PRN For Temp greater than 38 C (100.4 F), mild pain  ondansetron Injectable 4 milliGRAM(s) IV Push every 6 hours PRN Nausea  senna 2 Tablet(s) Oral at bedtime PRN Constipation  dextrose Gel 1 Dose(s) Oral once PRN Blood Glucose LESS THAN 70 milliGRAM(s)/deciliter  glucagon  Injectable 1 milliGRAM(s) IntraMuscular once PRN Glucose LESS THAN 70 milligrams/deciliter  docusate sodium 100 milliGRAM(s) Oral daily PRN Constipation  polyethylene glycol 3350 17 Gram(s) Oral daily PRN Constipation          Vital Signs Last 24 Hrs  T(C): 37.2 (19 Sep 2017 04:53), Max: 37.2 (19 Sep 2017 04:53)  T(F): 98.9 (19 Sep 2017 04:53), Max: 98.9 (19 Sep 2017 04:53)  HR: 69 (19 Sep 2017 04:53) (69 - 92)  BP: 122/63 (19 Sep 2017 04:53) (122/63 - 160/77)  BP(mean): --  RR: 18 (19 Sep 2017 04:53) (16 - 18)  SpO2: 97% (19 Sep 2017 04:53) (96% - 99%)  Daily     Daily       PHYSICAL EXAM:  Alert and appropriate  GENERAL: No apparent distress  CHEST/LUNG: fair air entry  HEART: S1S2 RRR  ABDOMEN: soft  EXTREMITIES: no edema  SKIN:     LABS:                        9.0    x     )-----------( x        ( 19 Sep 2017 05:30 )             26.6     09-19    135  |  101  |  16  ----------------------------<  97  3.7   |  27  |  1.84<H>    Ca    8.3<L>      19 Sep 2017 05:30                  RADIOLOGY & ADDITIONAL TESTS:

## 2017-09-19 NOTE — PROGRESS NOTE ADULT - SUBJECTIVE AND OBJECTIVE BOX
COLEMAN CARRANZA  MRN: [id number]    S: 9/18: patient has thoracentesis today and feeling better with the sob with no cough or wheezing     9/19  patient has chest discomfort over the site of thoracentesis yesterday and currently feeling better has no sob and biopsy results still awaited     PAST MEDICAL & SURGICAL HISTORY:  Pleural effusion  Breast CA  S/P cholecystectomy  H/O: hysterectomy      Vital Signs Last 24 Hrs  T(C): 37.2 (19 Sep 2017 04:53), Max: 37.2 (19 Sep 2017 04:53)  T(F): 98.9 (19 Sep 2017 04:53), Max: 98.9 (19 Sep 2017 04:53)  HR: 69 (19 Sep 2017 04:53) (69 - 92)  BP: 122/63 (19 Sep 2017 04:53) (122/63 - 160/77)  BP(mean): --  RR: 18 (19 Sep 2017 04:53) (16 - 18)  SpO2: 97% (19 Sep 2017 04:53) (96% - 99%)    PHYSICAL EXAM:      GENERAL: comfortable    NEURO: awake and alert    NECK: no JVD    CHEST: today has decreased breath sounds over the right lower base with dullness     CARDIAC: RR    EXT: no edema                                9.0    x     )-----------( x        ( 19 Sep 2017 05:30 )             26.6       MEDICATIONS  (STANDING):  insulin lispro (HumaLOG) corrective regimen sliding scale   SubCutaneous every 6 hours  dextrose 5%. 1000 milliLiter(s) (50 mL/Hr) IV Continuous <Continuous>  dextrose 50% Injectable 12.5 Gram(s) IV Push once  dextrose 50% Injectable 25 Gram(s) IV Push once  dextrose 50% Injectable 25 Gram(s) IV Push once  simvastatin 10 milliGRAM(s) Oral at bedtime  methimazole 10 milliGRAM(s) Oral daily  amLODIPine   Tablet 5 milliGRAM(s) Oral at bedtime  calcium carbonate  625 mG + Vitamin D (OsCal 250 + D) 1 Tablet(s) Oral two times a day  furosemide    Tablet 40 milliGRAM(s) Oral daily  influenza   Vaccine 0.5 milliLiter(s) IntraMuscular once  pantoprazole  Injectable 40 milliGRAM(s) IV Push two times a day        A/P: 1. Right pleural effusion. status post thoracentesis yesterday and still has decreased breath sounds in the right  base . fulid appears to be border exudate ordered protein and ldh today and cytology is still pending     2. GI bleed/anemia. H/H stable. Await gastric biopsy result.     3. Hx of diastolic CHF. Continue present treatment. NO evidence of acute decompensation.     would follow up final fluid cytology and and would repeat cxr for the possible recurrent effusion or pneumothorax

## 2017-09-19 NOTE — PROGRESS NOTE ADULT - SUBJECTIVE AND OBJECTIVE BOX
Patient is a 66y old  Female who presents with a chief complaint of abd pain, dizziness (13 Sep 2017 22:28)      HPI:  66 y.o. female with PMH breast ca s/p lumpectomies, LN dissections on chemo with L lymphedema, C2 fusion for pathologic fx, diastolic CHF, HTN, HLD, DM2, hx R pleural effusion, hx benign thyroid nodules, hyperthyroidism presents with abdominal pain. Pt states the abdominal pain started 2 weeks ago, and every time pt eats, she vomits. Pt states decreased po intake. Denies fever, chills, CP, palpitations, SOB. The abd pain is epigastric and L and R upper abdominal pain, improves with pain med and PPI. States burning sensation. Pt has LH and dizziness with change in positions. Pt denies blood in stool, but does report "dark" stool. Pt last chemo few months ago.        Pt is comf, mild fatigue and upper abd discomfort s N V  yaz diet  wait path    PMH: as above  PSMH: hysterectomy, b/l lumpectomies with LN dissections, cholecystectomy, thoracentesis, mediport placement  Family Hx: denies ca hx  Social Hx: denies tobacco, EtOH, drugs  ROS: per HPI (13 Sep 2017 22:28)      PAST MEDICAL & SURGICAL HISTORY:  Pleural effusion  Breast CA  S/P cholecystectomy  H/O: hysterectomy      MEDICATIONS  (STANDING):  insulin lispro (HumaLOG) corrective regimen sliding scale   SubCutaneous every 6 hours  dextrose 5%. 1000 milliLiter(s) (50 mL/Hr) IV Continuous <Continuous>  dextrose 50% Injectable 12.5 Gram(s) IV Push once  dextrose 50% Injectable 25 Gram(s) IV Push once  dextrose 50% Injectable 25 Gram(s) IV Push once  simvastatin 10 milliGRAM(s) Oral at bedtime  methimazole 10 milliGRAM(s) Oral daily  amLODIPine   Tablet 5 milliGRAM(s) Oral at bedtime  calcium carbonate  625 mG + Vitamin D (OsCal 250 + D) 1 Tablet(s) Oral two times a day  furosemide    Tablet 40 milliGRAM(s) Oral daily  influenza   Vaccine 0.5 milliLiter(s) IntraMuscular once  pantoprazole  Injectable 40 milliGRAM(s) IV Push two times a day    MEDICATIONS  (PRN):  acetaminophen   Tablet 650 milliGRAM(s) Oral every 6 hours PRN For Temp greater than 38 C (100.4 F), mild pain  ondansetron Injectable 4 milliGRAM(s) IV Push every 6 hours PRN Nausea  senna 2 Tablet(s) Oral at bedtime PRN Constipation  dextrose Gel 1 Dose(s) Oral once PRN Blood Glucose LESS THAN 70 milliGRAM(s)/deciliter  glucagon  Injectable 1 milliGRAM(s) IntraMuscular once PRN Glucose LESS THAN 70 milligrams/deciliter      Allergies    latex (Unknown)  No Known Drug Allergies    Intolerances        SOCIAL HISTORY:NC    FAMILY HISTORY:  NC    REVIEW OF SYSTEMS:    CONSTITUTIONAL: No weakness, fevers or chills  EYES/ENT: No visual changes;  No vertigo or throat pain   NECK: No pain or stiffness  RESPIRATORY: No cough, wheezing, hemoptysis; No shortness of breath  CARDIOVASCULAR: No chest pain or palpitations  GENITOURINARY: No dysuria, frequency or hematuria  NEUROLOGICAL: No numbness or weakness  SKIN: No itching, burning, rashes, or lesions   All other review of systems is negative unless indicated above.    Vital Signs Last 24 Hrs  T(C): 37.2 (19 Sep 2017 04:53), Max: 37.2 (19 Sep 2017 04:53)  T(F): 98.9 (19 Sep 2017 04:53), Max: 98.9 (19 Sep 2017 04:53)  HR: 69 (19 Sep 2017 04:53) (69 - 92)  BP: 122/63 (19 Sep 2017 04:53) (122/63 - 160/77)  BP(mean): --  RR: 18 (19 Sep 2017 04:53) (16 - 18)  SpO2: 97% (19 Sep 2017 04:53) (96% - 99%)    PHYSICAL EXAM:    Constitutional: NAD, well-developed  HEENT: EOMI, throat clear  Neck: No LAD, supple  Respiratory: CTA and P  Cardiovascular: S1 and S2, RRR, no M  Gastrointestinal: BS+, soft, NT/ND, neg HSM,  Extremities: No peripheral edema, neg clubing, cyanosis  Vascular: 2+ peripheral pulses  Neurological: A/O x 3, no focal deficits  Psychiatric: Normal mood, normal affect  Skin: No rashes    LABS:  CBC Full  -  ( 19 Sep 2017 05:30 )  WBC Count : x  Hemoglobin : 9.0 g/dL  Hematocrit : 26.6 %  Platelet Count - Automated : x  Mean Cell Volume : x  Mean Cell Hemoglobin : x  Mean Cell Hemoglobin Concentration : x  Auto Neutrophil # : x  Auto Lymphocyte # : x  Auto Monocyte # : x  Auto Eosinophil # : x  Auto Basophil # : x  Auto Neutrophil % : x  Auto Lymphocyte % : x  Auto Monocyte % : x  Auto Eosinophil % : x  Auto Basophil % : x    09-19    135  |  101  |  16  ----------------------------<  97  3.7   |  27  |  1.84<H>    Ca    8.3<L>      19 Sep 2017 05:30              RADIOLOGY & ADDITIONAL STUDIES:

## 2017-09-19 NOTE — PROGRESS NOTE ADULT - SUBJECTIVE AND OBJECTIVE BOX
CC: abd pain.     History of Present Illness: 	  66 y.o. female with PMH breast ca s/p lumpectomies, LN dissections on chemo with L lymphedema, C2 fusion for pathologic fx, diastolic CHF, HTN, HLD, DM2, hx R pleural effusion, hx benign thyroid nodules, hyperthyroidism presented with abdominal pain X 2 weeks. Also, noted weight loss > 40lbs in 6 months every time pt eats, she vomits.    Hospital course: underwent EGD found to have PUD. Had Thoracentesis for right pleural effusion by IR on 9/18 with negative malignant cells.     Seen with daughter at bedside, eager for EGD pathology results. Had pain around thora site yesterday now resolved. C/o constipation X 2 days.     ROS: neg unless stated above.     Vital Signs Last 24 Hrs  T(C): 37.2 (19 Sep 2017 04:53), Max: 37.2 (19 Sep 2017 04:53)  T(F): 98.9 (19 Sep 2017 04:53), Max: 98.9 (19 Sep 2017 04:53)  HR: 69 (19 Sep 2017 04:53) (69 - 92)  BP: 122/63 (19 Sep 2017 04:53) (122/63 - 160/77)  BP(mean): --  RR: 18 (19 Sep 2017 04:53) (16 - 18)  SpO2: 97% (19 Sep 2017 04:53) (96% - 99%)    PHYSICAL EXAM:  General: NAD, seated comfortably at bedside, nad, good O2 sats on room air. Daughter at bedside.   Neuro: AAOx3, no focal deficits  HEENT: NCAT, PERRLA, EOMI,   Neck: Soft and supple, No JVD  Respiratory: decreased right lung sounds at bases, no active wheezing or rhonchi. Mild TTP over incision site.   Cardiovascular: S1 and S2, RRR, no m/g/r  Gastrointestinal: +BS, soft, nondistended, minimal ttp, no rebound/guarding  Extremities: LUE lymphedema (w/ compression sleeve),  no LE c/c/e  Vascular: 2+ peripheral pulses  Musculoskeletal: 5/5 strength b/l UE and LE, sensation intact  Skin: No rashes      LABS: All Labs Reviewed:                        9.0    x     )-----------( x        ( 19 Sep 2017 05:30 )             26.6                           8.6    x     )-----------( x        ( 18 Sep 2017 05:45 )             27.2   09-19    135  |  101  |  16  ----------------------------<  97  3.7   |  27  |  1.84<H>    Ca    8.3<L>      19 Sep 2017 05:30      IR Procedure (09.18.17 @ 11:48) >  3. Aspiration of 1360 cc of clear, yellow fluid. A sample of this was   sent for laboratory analysis.  4. Postprocedure ultrasound demonstrates resolution of right pleural   effusion    IMPRESSION:  Successful ultrasound-guided thoracentesis as above    Culture - Urine (09.13.17 @ 15:34)    Specimen Source: .Urine Clean Catch (Midstream)    Culture Results:   <10,000 CFU/ml of other organisms    CARDIAC MARKERS ( 13 Sep 2017 15:34 )  <0.015 ng/mL / x     / x     / x     / x        < from: CT Abdomen and Pelvis w/ Oral Cont (09.13.17 @ 18:41) >  IMPRESSION:   Moderate free fluid is seen within the pelvis. Gastric wall   thickening suggestive of gastritis. Moderate RIGHT pleural effusion.            < from: Upper Endoscopy (09.15.17 @ 07:04) >   A medium amount of food (residue) was found in the gastric body.         Diffuse mildly erythematous mucosa without bleeding was found in the   gastric body. Biopsies were taken with a cold forceps for histology.    - Erythematous mucosa in the gastric body.Biopsied.    MEDICATIONS  (STANDING):  insulin lispro (HumaLOG) corrective regimen sliding scale   SubCutaneous every 6 hours  dextrose 5%. 1000 milliLiter(s) (50 mL/Hr) IV Continuous <Continuous>  dextrose 50% Injectable 12.5 Gram(s) IV Push once  dextrose 50% Injectable 25 Gram(s) IV Push once  dextrose 50% Injectable 25 Gram(s) IV Push once  simvastatin 10 milliGRAM(s) Oral at bedtime  methimazole 10 milliGRAM(s) Oral daily  amLODIPine   Tablet 5 milliGRAM(s) Oral at bedtime  calcium carbonate  625 mG + Vitamin D (OsCal 250 + D) 1 Tablet(s) Oral two times a day  furosemide    Tablet 40 milliGRAM(s) Oral daily  influenza   Vaccine 0.5 milliLiter(s) IntraMuscular once  pantoprazole  Injectable 40 milliGRAM(s) IV Push two times a day      Assessment and Plan:   66y female PMH breast ca s/p lumpectomies, LN dissections on chemo with L lymphedema and other comorbidities admitted w/ abdominal pain found to have anemia 2/2 GIB, PUD awaiting path results for concern of malignancy:     *anemia due to GI bleed, PUD   - gastritis on CT  - EGD 9/15 w/ diffuse mildly erythematous mucosa without bleeding was found in the    gastric body. Biopsies taken.  Distal G body/antrum with obstruction and likely ulceration, bx done  - +fobt  - h/h improved s/p 2uprbc- stable  - PPI drip day 3--> change to IV bid 9/17 --> will further change to PO Daily tomorrow.   - AWAITING PATH RESULTS.   - iron supplement daily.     *breast cancer s/p lumpectomies, LN dissections, LUE lymphedema  - sees Dr. Parker outpt [facility]  - Heme/Onc f/u appreciated  9/19- pt wants to establish care locally,  Heme/Onc to f/u  regarding plan    *chronic diastolic CHF, right pleural effusion   - good O2 sats on room air, no sob   -  echo 8/2-17- EF 65-70%  - continue home dose lasix  - repeat cxr today r/o PTX. No malignant cells noted, appreciate Pulm input.   - (had thoracentesis 5 mo. 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   - Cr improved, monitor on lasix   - ACEi held  - Nephro f/u appreciated     *T2D  - hold metformin  - ISS, diabetic diet    *hyperthyroidism  - continue methimazole    *dvt px  - scds     Dispo: remain inpatient for EGD biopsy results. Heme/Onc to follow up re: breast cancer management (pt wants to establish care locally;  last saw Dr. Parker 2months ago at [facility] for tx but felt too weak to return).   Gastric biopsy results pending.       Discussed w/ pt, daughter and Pulm.     Total time > 45 mins.

## 2017-09-20 ENCOUNTER — TRANSCRIPTION ENCOUNTER (OUTPATIENT)
Age: 67
End: 2017-09-20

## 2017-09-20 VITALS
RESPIRATION RATE: 16 BRPM | DIASTOLIC BLOOD PRESSURE: 63 MMHG | HEART RATE: 66 BPM | TEMPERATURE: 97 F | SYSTOLIC BLOOD PRESSURE: 143 MMHG | OXYGEN SATURATION: 94 %

## 2017-09-20 LAB
ANION GAP SERPL CALC-SCNC: 7 MMOL/L — SIGNIFICANT CHANGE UP (ref 5–17)
BUN SERPL-MCNC: 20 MG/DL — SIGNIFICANT CHANGE UP (ref 7–23)
CALCIUM SERPL-MCNC: 8.4 MG/DL — LOW (ref 8.5–10.1)
CHLORIDE SERPL-SCNC: 99 MMOL/L — SIGNIFICANT CHANGE UP (ref 96–108)
CO2 SERPL-SCNC: 28 MMOL/L — SIGNIFICANT CHANGE UP (ref 22–31)
CREAT SERPL-MCNC: 1.85 MG/DL — HIGH (ref 0.5–1.3)
GLUCOSE SERPL-MCNC: 96 MG/DL — SIGNIFICANT CHANGE UP (ref 70–99)
HCT VFR BLD CALC: 26.3 % — LOW (ref 34.5–45)
HGB BLD-MCNC: 8.6 G/DL — LOW (ref 11.5–15.5)
MCHC RBC-ENTMCNC: 26.3 PG — LOW (ref 27–34)
MCHC RBC-ENTMCNC: 32.9 GM/DL — SIGNIFICANT CHANGE UP (ref 32–36)
MCV RBC AUTO: 79.7 FL — LOW (ref 80–100)
PLATELET # BLD AUTO: 217 K/UL — SIGNIFICANT CHANGE UP (ref 150–400)
POTASSIUM SERPL-MCNC: 3.8 MMOL/L — SIGNIFICANT CHANGE UP (ref 3.5–5.3)
POTASSIUM SERPL-SCNC: 3.8 MMOL/L — SIGNIFICANT CHANGE UP (ref 3.5–5.3)
RBC # BLD: 3.29 M/UL — LOW (ref 3.8–5.2)
RBC # FLD: 16.1 % — HIGH (ref 10.3–14.5)
SODIUM SERPL-SCNC: 134 MMOL/L — LOW (ref 135–145)
SURGICAL PATHOLOGY FINAL REPORT - CH: SIGNIFICANT CHANGE UP
WBC # BLD: 6.7 K/UL — SIGNIFICANT CHANGE UP (ref 3.8–10.5)
WBC # FLD AUTO: 6.7 K/UL — SIGNIFICANT CHANGE UP (ref 3.8–10.5)

## 2017-09-20 RX ORDER — SENNA PLUS 8.6 MG/1
2 TABLET ORAL
Qty: 0 | Refills: 0 | COMMUNITY
Start: 2017-09-20

## 2017-09-20 RX ORDER — POLYETHYLENE GLYCOL 3350 17 G/17G
17 POWDER, FOR SOLUTION ORAL
Qty: 0 | Refills: 0 | COMMUNITY
Start: 2017-09-20

## 2017-09-20 RX ORDER — DOCUSATE SODIUM 100 MG
1 CAPSULE ORAL
Qty: 0 | Refills: 0 | COMMUNITY
Start: 2017-09-20

## 2017-09-20 RX ORDER — PANTOPRAZOLE SODIUM 20 MG/1
40 TABLET, DELAYED RELEASE ORAL
Qty: 0 | Refills: 0 | COMMUNITY
Start: 2017-09-20

## 2017-09-20 RX ORDER — FAMOTIDINE 10 MG/ML
1 INJECTION INTRAVENOUS
Qty: 0 | Refills: 0 | COMMUNITY

## 2017-09-20 RX ORDER — INSULIN LISPRO 100/ML
0 VIAL (ML) SUBCUTANEOUS
Qty: 0 | Refills: 0 | COMMUNITY
Start: 2017-09-20

## 2017-09-20 RX ORDER — SENNA PLUS 8.6 MG/1
1 TABLET ORAL
Qty: 0 | Refills: 0 | COMMUNITY
Start: 2017-09-20

## 2017-09-20 RX ADMIN — Medication 40 MILLIGRAM(S): at 05:35

## 2017-09-20 RX ADMIN — PANTOPRAZOLE SODIUM 40 MILLIGRAM(S): 20 TABLET, DELAYED RELEASE ORAL at 05:35

## 2017-09-20 RX ADMIN — Medication 1 TABLET(S): at 05:35

## 2017-09-20 NOTE — DISCHARGE NOTE ADULT - MEDICATION SUMMARY - MEDICATIONS TO STOP TAKING
I will STOP taking the medications listed below when I get home from the hospital:    Pepcid AC 10 mg oral tablet  -- 1 tab(s) by mouth once

## 2017-09-20 NOTE — PROGRESS NOTE ADULT - SUBJECTIVE AND OBJECTIVE BOX
COLEMAN CARRANZA  MRN: [id number]    S: 9/18: patient has thoracentesis today and feeling better with the sob with no cough or wheezing     9/19  patient has chest discomfort over the site of thoracentesis yesterday and currently feeling better has no sob and biopsy results still awaited     9/20 patient chest pain is resolved with no acute sob or cough or wheezing and follow up cxr done shows small effusion with no pneumothorax and fluid is exudative and cytology was negative     PAST MEDICAL & SURGICAL HISTORY:  Pleural effusion  Breast CA  S/P cholecystectomy  H/O: hysterectomy      Vital Signs Last 24 Hrs  T(C): 36.7 (20 Sep 2017 04:41), Max: 37.7 (19 Sep 2017 20:54)  T(F): 98.1 (20 Sep 2017 04:41), Max: 99.9 (19 Sep 2017 20:54)  HR: 97 (20 Sep 2017 04:41) (91 - 97)  BP: 134/76 (20 Sep 2017 04:41) (134/76 - 144/74)  BP(mean): --  RR: 16 (20 Sep 2017 04:41) (16 - 16)  SpO2: 97% (20 Sep 2017 04:41) (96% - 98%)    PHYSICAL EXAM:      GENERAL: comfortable    NEURO: awake and alert    NECK: no JVD    CHEST: today has decreased breath sounds over the right lower base no wheeze     CARDIAC: RR    EXT: no edema                                8.6    6.7   )-----------( 217      ( 20 Sep 2017 05:37 )             26.3   09-20    134<L>  |  99  |  20  ----------------------------<  96  3.8   |  28  |  1.85<H>    Ca    8.4<L>      20 Sep 2017 05:37    TPro  5.3<L>  /  Alb  x   /  TBili  x   /  DBili  x   /  AST  x   /  ALT  x   /  AlkPhos  x   09-19    MEDICATIONS  (STANDING):  insulin lispro (HumaLOG) corrective regimen sliding scale   SubCutaneous every 6 hours  dextrose 5%. 1000 milliLiter(s) (50 mL/Hr) IV Continuous <Continuous>  dextrose 50% Injectable 12.5 Gram(s) IV Push once  dextrose 50% Injectable 25 Gram(s) IV Push once  dextrose 50% Injectable 25 Gram(s) IV Push once  simvastatin 10 milliGRAM(s) Oral at bedtime  methimazole 10 milliGRAM(s) Oral daily  amLODIPine   Tablet 5 milliGRAM(s) Oral at bedtime  calcium carbonate  625 mG + Vitamin D (OsCal 250 + D) 1 Tablet(s) Oral two times a day  furosemide    Tablet 40 milliGRAM(s) Oral daily  influenza   Vaccine 0.5 milliLiter(s) IntraMuscular once  pantoprazole  Injectable 40 milliGRAM(s) IV Push two times a day        A/P: 1. Right pleural effusion. status post thoracentesis with small residual effusion on the follow up cxr and fluid cytology is negative for the malignant cells .      2. GI bleed/anemia.  Await gastric biopsy result continue to monitor for the HB     3. Hx of diastolic CHF. Continue present treatment. NO evidence of acute decompensation.     4 use of veno dynes for the dvt prophylaxis     5 will follow up as an out pt once discharged

## 2017-09-20 NOTE — PROGRESS NOTE ADULT - SUBJECTIVE AND OBJECTIVE BOX
Patient is a 66y old  Female who presents with a chief complaint of abd pain, dizziness (13 Sep 2017 22:28)      HPI:  66 y.o. female with PMH breast ca s/p lumpectomies, LN dissections on chemo with L lymphedema, C2 fusion for pathologic fx, diastolic CHF, HTN, HLD, DM2, hx R pleural effusion, hx benign thyroid nodules, hyperthyroidism presents with abdominal pain. Pt states the abdominal pain started 2 weeks ago, and every time pt eats, she vomits. Pt states decreased po intake. Denies fever, chills, CP, palpitations, SOB. The abd pain is epigastric and L and R upper abdominal pain, improves with pain med and PPI. States burning sensation. Pt has LH and dizziness with change in positions. Pt denies blood in stool, but does report "dark" stool. Pt last chemo few months ago.    Pt reports wanting to find oncologist that's local as she can't go to [facility] anymore.  Patient with mild upper abdominal discomfort. Tolerating diet with eating small amounts. His neck she feels early. She is on a liquid diet.  Upper abdominal discomfort is crampy and at times burning sensation. She denies any fevers or chills or vomiting.  She denies any chest pain or shortness of breath.        PMH: as above  PSMH: hysterectomy, b/l lumpectomies with LN dissections, cholecystectomy, thoracentesis, mediport placement  Family Hx: denies ca hx  Social Hx: denies tobacco, EtOH, drugs  ROS: per HPI (13 Sep 2017 22:28)      PAST MEDICAL & SURGICAL HISTORY:  Pleural effusion  Breast CA  S/P cholecystectomy  H/O: hysterectomy      MEDICATIONS  (STANDING):  insulin lispro (HumaLOG) corrective regimen sliding scale   SubCutaneous every 6 hours  dextrose 5%. 1000 milliLiter(s) (50 mL/Hr) IV Continuous <Continuous>  dextrose 50% Injectable 12.5 Gram(s) IV Push once  dextrose 50% Injectable 25 Gram(s) IV Push once  dextrose 50% Injectable 25 Gram(s) IV Push once  simvastatin 10 milliGRAM(s) Oral at bedtime  methimazole 10 milliGRAM(s) Oral daily  amLODIPine   Tablet 5 milliGRAM(s) Oral at bedtime  calcium carbonate  625 mG + Vitamin D (OsCal 250 + D) 1 Tablet(s) Oral two times a day  furosemide    Tablet 40 milliGRAM(s) Oral daily  influenza   Vaccine 0.5 milliLiter(s) IntraMuscular once  pantoprazole  Injectable 40 milliGRAM(s) IV Push two times a day    MEDICATIONS  (PRN):  acetaminophen   Tablet 650 milliGRAM(s) Oral every 6 hours PRN For Temp greater than 38 C (100.4 F), mild pain  ondansetron Injectable 4 milliGRAM(s) IV Push every 6 hours PRN Nausea  senna 2 Tablet(s) Oral at bedtime PRN Constipation  dextrose Gel 1 Dose(s) Oral once PRN Blood Glucose LESS THAN 70 milliGRAM(s)/deciliter  glucagon  Injectable 1 milliGRAM(s) IntraMuscular once PRN Glucose LESS THAN 70 milligrams/deciliter  docusate sodium 100 milliGRAM(s) Oral daily PRN Constipation  polyethylene glycol 3350 17 Gram(s) Oral daily PRN Constipation      Allergies    latex (Unknown)  No Known Drug Allergies    Intolerances        SOCIAL HISTORY:NC    FAMILY HISTORY:NC      REVIEW OF SYSTEMS:    CONSTITUTIONAL: No weakness, fevers or chills  EYES/ENT: No visual changes;  No vertigo or throat pain   NECK: No pain or stiffness  RESPIRATORY: No cough, wheezing, hemoptysis; No shortness of breath  CARDIOVASCULAR: No chest pain or palpitations  GENITOURINARY: No dysuria, frequency or hematuria  NEUROLOGICAL: No numbness or weakness  SKIN: No itching, burning, rashes, or lesions   All other review of systems is negative unless indicated above.    Vital Signs Last 24 Hrs  T(C): 36.7 (20 Sep 2017 04:41), Max: 37.7 (19 Sep 2017 20:54)  T(F): 98.1 (20 Sep 2017 04:41), Max: 99.9 (19 Sep 2017 20:54)  HR: 97 (20 Sep 2017 04:41) (91 - 97)  BP: 134/76 (20 Sep 2017 04:41) (134/76 - 144/74)  BP(mean): --  RR: 16 (20 Sep 2017 04:41) (16 - 16)  SpO2: 97% (20 Sep 2017 04:41) (96% - 98%)    PHYSICAL EXAM:    Constitutional: NAD, well-developed  HEENT: EOMI, throat clear  Neck: No LAD, supple  Respiratory: CTA and P  Cardiovascular: S1 and S2, RRR, no M  Gastrointestinal: BS+, soft, mild upper tend/ND, neg HSM,  Extremities: No peripheral edema, neg clubing, cyanosis  Vascular: 2+ peripheral pulses  Neurological: A/O x 3, no focal deficits  Psychiatric: Normal mood, normal affect  Skin: No rashes    LABS:  CBC Full  -  ( 20 Sep 2017 05:37 )  WBC Count : 6.7 K/uL  Hemoglobin : 8.6 g/dL  Hematocrit : 26.3 %  Platelet Count - Automated : 217 K/uL  Mean Cell Volume : 79.7 fl  Mean Cell Hemoglobin : 26.3 pg  Mean Cell Hemoglobin Concentration : 32.9 gm/dL  Auto Neutrophil # : x  Auto Lymphocyte # : x  Auto Monocyte # : x  Auto Eosinophil # : x  Auto Basophil # : x  Auto Neutrophil % : x  Auto Lymphocyte % : x  Auto Monocyte % : x  Auto Eosinophil % : x  Auto Basophil % : x    09-20    134<L>  |  99  |  20  ----------------------------<  96  3.8   |  28  |  1.85<H>    Ca    8.4<L>      20 Sep 2017 05:37    TPro  5.3<L>  /  Alb  x   /  TBili  x   /  DBili  x   /  AST  x   /  ALT  x   /  AlkPhos  x   09-19            RADIOLOGY & ADDITIONAL STUDIES:  < from: Xray Chest 2 Views PA/Lat (09.19.17 @ 14:12) >    EXAM:  CHEST P.A. LATERAL                            PROCEDURE DATE:  09/19/2017          INTERPRETATION:      Chest radiographs        CLINICAL INFORMATION:       Chest pain    TECHNIQUE:  Frontal and lateral views of the chest were obtained.    FINDINGS:  9/18/2017 available for review.    The lungs are significant for a small RIGHT pleural effusion with   underlying atelectasis. LEFT central venous catheter is unchanged.      The heart and mediastinum appear intact.           IMPRESSION:  small RIGHT pleural effusion with underlying atelectasis.   LEFT central venous catheter is unchanged                       < end of copied text >

## 2017-09-20 NOTE — DISCHARGE NOTE ADULT - MEDICATION SUMMARY - MEDICATIONS TO TAKE
I will START or STAY ON the medications listed below when I get home from the hospital:    insulin lispro 100 units/mL subcutaneous solution  --  subcutaneous every 6 hours; 1 Unit(s) if Glucose 151 - 200  2 Unit(s) if Glucose 201 - 250  3 Unit(s) if Glucose 251 - 300  4 Unit(s) if Glucose 301 - 350  5 Unit(s) if Glucose 351 - 400  6 Unit(s) if Glucose Greater Than 400  -- Indication: For Diabetes A1c 6.7% - while at the hospital otherwise do not need this at home    simvastatin 10 mg oral tablet  -- 1 tab(s) by mouth once a day (at bedtime)  -- Indication: For HLD    methIMAzole 10 mg oral tablet  -- 1 tab(s) by mouth once a day  -- Indication: For Hyperthyroidism    Norvasc 5 mg oral tablet  -- 1 tab(s) by mouth once a day (at bedtime)  -- Indication: For HTN    Lasix 40 mg oral tablet  -- 1 tab(s) by mouth once a day  -- Avoid prolonged or excessive exposure to direct and/or artificial sunlight while taking this medication.  It is very important that you take or use this exactly as directed.  Do not skip doses or discontinue unless directed by your doctor.  It may be advisable to drink a full glass orange juice or eat a banana daily while taking this medication.    -- Indication: For CHF    docusate sodium 100 mg oral capsule  -- 1 cap(s) by mouth once a day, As needed, Constipation  -- Indication: For Constipation    polyethylene glycol 3350 oral powder for reconstitution  -- 17 gram(s) by mouth once a day, As needed, Constipation  -- Indication: For Constipation    senna oral tablet  -- 2 tab(s) by mouth once a day (at bedtime), As needed, Constipation  -- Indication: For Constipation    Klor-Con 10 oral tablet, extended release  -- 1 tab(s) by mouth once a day. on days taking lasix.  -- It is very important that you take or use this exactly as directed.  Do not skip doses or discontinue unless directed by your doctor.  Medication should be taken with plenty of water.  Take with food or milk.    -- Indication: For Potassium    Protonix 40 mg oral delayed release tablet  -- 1 tab(s) by mouth 2 times a day  -- Indication: For GI bleed    Calcium 600+D oral tablet  -- 1 tab(s) by mouth 2 times a day  -- Indication: For Calcium

## 2017-09-20 NOTE — DISCHARGE NOTE ADULT - CARE PROVIDERS DIRECT ADDRESSES
,[email or web address],DirectAddress_Unknown

## 2017-09-20 NOTE — DISCHARGE NOTE ADULT - CARE PLAN
Principal Discharge DX:	Gastrointestinal hemorrhage, unspecified gastrointestinal hemorrhage type  Goal:	Stable.  Instructions for follow-up, activity and diet:	Get stent placed for partial gastric outlet obstruction  Secondary Diagnosis:	Pleural effusion  Goal:	Improvement in breathing  Instructions for follow-up, activity and diet:	You had a thoracentesis that removed fluid, cytology is negative for malignant cells.

## 2017-09-20 NOTE — PROGRESS NOTE ADULT - ASSESSMENT
66 y.o. female with PMH breast ca s/p lumpectomies, LN dissections on chemo with L lymphedema, C2 fusion for pathologic fx, diastolic CHF, HTN, HLD, DM2, hx R pleural effusion, hx benign thyroid nodules, hyperthyroidism presents with abdominal pain. Pt states the abdominal pain started 2 weeks ago, and every time pt eats, she vomits. Pt states decreased po intake. Denies fever, chills, CP, palpitations, SOB. The abd pain is epigastric and L and R upper abdominal pain, improves with pain med and PPI. States burning sensation. Pt has LH and dizziness with change in positions. Pt denies blood in stool, but does report "dark" stool. Pt last chemo few months ago.  Pt reports wanting to find oncologist that's local as she can't go to [facility] anymore.  Pt was admitted in August with elevated creat 1.8 -1.9 range  Never evaluated by Nephrology before..  Is aware of the fact that has renal issues, but not the etiology  CT abd pelvis reviewed , small calcification R kidney, no hydro.  Will request outside records to evaluate patients  baseline renal function..    9/15  creat stable  case d/w pts daughter  she will follow up as outpt    9/16  Wearing left arm stocking  feels well  pt seen w Dr Cortez  Gastric obstruction, npo  starting clear liquids  creat stable
67 yo female with history of breast cancer and N/V. Hct has been stable. Will advance to full liquids and continue close observation.
Patient is seen and consult dictated   - moderate right pleural effusion recurrent with the differential of effusion related with metastatic breast cancer with the malignant effusion with the differential of chf related effusion   - anemia with the positive guaiac status post PRBC kept NPO for endoscopy .    PLAN     - patient is asymptomatic of right effusion now and denies new pulmonary symptoms . would continue to monitor . continue to keep on maintains diuretics . would repeat cxr before discharge and if increase in size would consider thoracentesis . follow up hb and endoscopy report . use of venodynes for the dvt prophalxis
66 y.o. female with PMH breast ca s/p lumpectomies, LN dissections on chemo with L lymphedema, C2 fusion for pathologic fx, diastolic CHF, HTN, HLD, DM2, hx R pleural effusion, hx benign thyroid nodules, hyperthyroidism presents with abdominal pain. Pt states the abdominal pain started 2 weeks ago, and every time pt eats, she vomits. Pt states decreased po intake. Denies fever, chills, CP, palpitations, SOB. The abd pain is epigastric and L and R upper abdominal pain, improves with pain med and PPI. States burning sensation. Pt has LH and dizziness with change in positions. Pt denies blood in stool, but does report "dark" stool. Pt last chemo few months ago.  Pt reports wanting to find oncologist that's local as she can't go to [facility] anymore.  Pt was admitted in August with elevated creat 1.8 -1.9 range  Never evaluated by Nephrology before..  Is aware of the fact that has renal issues, but not the etiology  CT abd pelvis reviewed , small calcification R kidney, no hydro.  Will request outside records to evaluate patients  baseline renal function..    9/15  creat stable  case d/w pts daughter  she will follow up as outpt    9/16  Wearing left arm stocking  feels well  pt seen w Dr Cortez  Gastric obstruction, npo  starting clear liquids  creat stable    9/18 SY  --CKD of unclear etiology.   Follow up with repeat lab data.  --Right Pleural effusion   post thoracentesis.  Follow fluid analysis.  --GI  resolved.   No difficulty with po intake.  Continue to monitor.
66 y.o. female with PMH breast ca s/p lumpectomies, LN dissections on chemo with L lymphedema, C2 fusion for pathologic fx, diastolic CHF, HTN, HLD, DM2, hx R pleural effusion, hx benign thyroid nodules, hyperthyroidism presents with abdominal pain. Pt states the abdominal pain started 2 weeks ago, and every time pt eats, she vomits. Pt states decreased po intake. Denies fever, chills, CP, palpitations, SOB. The abd pain is epigastric and L and R upper abdominal pain, improves with pain med and PPI. States burning sensation. Pt has LH and dizziness with change in positions. Pt denies blood in stool, but does report "dark" stool. Pt last chemo few months ago.  Pt reports wanting to find oncologist that's local as she can't go to [facility] anymore.  Pt was admitted in August with elevated creat 1.8 -1.9 range  Never evaluated by Nephrology before..  Is aware of the fact that has renal issues, but not the etiology  CT abd pelvis reviewed , small calcification R kidney, no hydro.  Will request outside records to evaluate patients  baseline renal function..    9/15  creat stable  case d/w pts daughter  she will follow up as outpt    9/16  Wearing left arm stocking  feels well  pt seen w Dr Cortez  Gastric obstruction, npo  starting clear liquids  creat stable    9/18 SY  --CKD of unclear etiology.   Follow up with repeat lab data.  --Right Pleural effusion   post thoracentesis.  Follow fluid analysis.  --GI  resolved.   No difficulty with po intake.  Continue to monitor.    9/19 SY  --CKD of unclear etiology.  Parameters fairly stable   Cotinue to monitor  --Awaiting path reports.  --Fluid /Electrolytes stable.
66 y.o. female with PMH breast ca s/p lumpectomies, LN dissections on chemo with L lymphedema, C2 fusion for pathologic fx, diastolic CHF, HTN, HLD, DM2, hx R pleural effusion, hx benign thyroid nodules, hyperthyroidism presents with abdominal pain. Pt states the abdominal pain started 2 weeks ago, and every time pt eats, she vomits. Pt states decreased po intake. Denies fever, chills, CP, palpitations, SOB. The abd pain is epigastric and L and R upper abdominal pain, improves with pain med and PPI. States burning sensation. Pt has LH and dizziness with change in positions. Pt denies blood in stool, but does report "dark" stool. Pt last chemo few months ago.  Pt reports wanting to find oncologist that's local as she can't go to [facility] anymore.  Pt was admitted in August with elevated creat 1.8 -1.9 range  Never evaluated by Nephrology before..  Is aware of the fact that has renal issues, but not the etiology  CT abd pelvis reviewed , small calcification R kidney, no hydro.  Will request outside records to evaluate patients  baseline renal function..    9/15  creat stable  case d/w pts daughter  she will follow up as outpt
67yo female with breast cancer  ?malignant obstruction in stomach    will start clears  h/h stable
N V with anemia    PUD likely G tumor, limited Bx   await path    keep HCT>24  DW pt and daughter      pelvic ascites, concerning for mtastatatic dx, may happen with invasive lobular Ca  await on input      serial HCT
N V with anemia, yaz diet    PUD likely G tumor, limited Bx   await path, partial GOO  pt with ascites and possible metastatic dx from breast or primargy G ca will need to be considered    keep HCT>24  DW pt and daughter      pelvic ascites, concerning for mtastatatic dx, may happen with invasive lobular Ca        serial HCT, cont protonix
N V with anemia, yaz diet with upper abd pain    Reviewed with the pathologist. 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 I discussed with him the patient had a history of breast cancer and they will be assessing for possibility association with this however, more likely primary gastric cancer due to significant cell.    I discussed this with patient and her daughter.  As she has a partial gastric outlet obstruction, plan would be stent placement. I have reached out to physician at [facility], Joseph Mina for stent placement.    Patient and his daughter would like to be transferred.  Message left for primary care physician.    I am concerned that she may have metastatic disease with possible carcinomatosis due to presence of ascites. This was communicate with family.    Oncological input would be appreciated regarding management of her malignancy and possible treatment options.      keep HCT>24  DW pt and daughter      pelvic ascites, concerning for mtastatatic dx, may happen with invasive lobular Ca        serial HCT, cont protonix

## 2017-09-20 NOTE — DISCHARGE NOTE ADULT - CARE PROVIDER_API CALL
Chacha Suresh), Critical Care Medicine; Internal Medicine; Pulmonary Disease; Sleep Medicine  [address] [place] [address]  Phone: [phone]  Fax: [phone]    Bradly Manning), Gastroenterology  [address] [place] [address]  Phone: [phone]  Fax: [phone]    Kristi Sue), Medicine  [address] [place] [address]  [address]  [place] [address]  Phone: [phone]  Fax: [phone]

## 2017-09-20 NOTE — DISCHARGE NOTE ADULT - PLAN OF CARE
Stable. Get stent placed for partial gastric outlet obstruction Improvement in breathing You had a thoracentesis that removed fluid, cytology is negative for malignant cells.

## 2017-09-20 NOTE — DISCHARGE NOTE ADULT - PATIENT PORTAL LINK FT
“You can access the [facility] Patient Portal, offered by [facility], by registering with the following website: [email or web address]”

## 2017-09-20 NOTE — PROGRESS NOTE ADULT - PROVIDER SPECIALTY LIST ADULT
Gastroenterology
Hospitalist
Nephrology
Pulmonology
Gastroenterology
Nephrology

## 2017-09-20 NOTE — DISCHARGE NOTE ADULT - HOSPITAL COURSE
CC: abd pain.   History of Present Illness: 	  66 y.o. female with PMH breast ca s/p lumpectomies, LN dissections on chemo with L lymphedema, C2 fusion for pathologic fx, diastolic CHF, HTN, HLD, DM2, hx R pleural effusion, hx benign thyroid nodules, hyperthyroidism presented with abdominal pain X 2 weeks. Also, noted weight loss > 40lbs in 6 months every time pt eats, she vomits.    Hospital course: underwent EGD found to have PUD. Had Thoracentesis for right pleural effusion by IR on 9/18 with negative malignant cells.   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 Patient may have possibility of metastatic breast given hx of breast CA but primary gastric cancer is more likely. Needs Onc eval (used to follow at [facility] but too weak to travel there).   - noted to have partial gastric obstruction --> decision to transfer to [facility] for stent made by GI. Confirmed details.     Seen comfortable, no pain or melena reported. Awaiting transfer. Had BM this AM.  ROS: neg unless stated above.     Vital Signs Last 24 Hrs  T(C): 36.7 (20 Sep 2017 04:41), Max: 37.7 (19 Sep 2017 20:54)  T(F): 98.1 (20 Sep 2017 04:41), Max: 99.9 (19 Sep 2017 20:54)  HR: 97 (20 Sep 2017 04:41) (91 - 97)  BP: 134/76 (20 Sep 2017 04:41) (134/76 - 144/74)  BP(mean): --  RR: 16 (20 Sep 2017 04:41) (16 - 16)  SpO2: 97% (20 Sep 2017 04:41) (96% - 98%)    PHYSICAL EXAM:  General: NAD, seated comfortably at bedside, nad, good O2 sats on room air. Daughter at bedside.   Neuro: AAOx3, no focal deficits  HEENT: NCAT, PERRLA, EOMI,   Neck: Soft and supple, No JVD  Respiratory: decreased right lung sounds at bases, no active wheezing or rhonchi.  Cardiovascular: S1 and S2, RRR, no m/g/r  Gastrointestinal: +BS, soft, nondistended, minimal ttp, no rebound/guarding  Extremities: LUE lymphedema (w/ compression sleeve),  no LE c/c/e  Vascular: 2+ peripheral pulses  Musculoskeletal: 5/5 strength b/l UE and LE, sensation intact  Skin: No rashes  : All Labs Reviewed:               Image reviewed.   IR Procedure (09.18.17 @ 11:48) >  3. Aspiration of 1360 cc of clear, yellow fluid. A sample of this was   sent for laboratory analysis.  4. Postprocedure ultrasound demonstrates resolution of right pleural   effusion    IMPRESSION:  Successful ultrasound-guided thoracentesis as above    < from: CT Abdomen and Pelvis w/ Oral Cont (09.13.17 @ 18:41) >  IMPRESSION:   Moderate free fluid is seen within the pelvis. Gastric wall   thickening suggestive of gastritis. Moderate RIGHT pleural effusion.        < from: Upper Endoscopy (09.15.17 @ 07:04) >   A medium amount of food (residue) was found in the gastric body.         Diffuse mildly erythematous mucosa without bleeding was found in the   gastric body. Biopsies were taken with a cold forceps for histology.    - Erythematous mucosa in the gastric body.Biopsied  Assessment and Plan:   66y female PMH breast ca s/p lumpectomies, LN dissections on chemo with L lymphedema and other comorbidities admitted w/ abdominal pain found to have anemia 2/2 GIB, PUD awaiting path results for concern of malignancy:     *anemia due to GI bleed, PUD and hx of malignancy now with concern for gastric tumor.   - gastritis on CT, - +fobt  - EGD 9/15 w/ diffuse mildly erythematous mucosa without bleeding was found in the    gastric body. Biopsies taken.  Distal G body/antrum with obstruction and likely ulceration, bx done  - h/h improved s/p 2uprbc- stable.   - switch to PO PPI BID from 5 days of IV therapy. H&H with slight drop, needs daily CBC.    - iron supplement daily.     # Partial gastric outlet obstruction - for stent placement at [facility], transfer today.   - Start clear liquid diet.     *breast cancer s/p lumpectomies, LN dissections, LUE lymphedema  - sees Dr. Parker outpt [facility]  9/20- pt wants to establish care locally,  Heme/Onc to f/u  regarding plan    *chronic diastolic CHF, right pleural effusion   - good O2 sats on room air, no sob   -  echo 8/2-17- EF 65-70%  - continue home dose lasix  - repeat cxr today r/o PTX. No malignant cells noted, appreciate Pulm input.   - (had thoracentesis 5 mo. 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   - Cr improved, monitor on lasix , - ACEi held    *T2D- hold metformin- ISS, diabetic diet    *hyperthyroidism - continue methimazole  *dvt px  - scds   Discharge to [facility] today.     Case discussed with Dr. Sebastien ORLANDO at [facility] and Dr. Smith.   Accepting Hospitalist Dr. Nash.   GI consult at [facility]: Dr. Tyler Saavedra.     Total time > 65 mins.

## 2017-09-22 DIAGNOSIS — K92.2 GASTROINTESTINAL HEMORRHAGE, UNSPECIFIED: ICD-10-CM

## 2017-09-22 DIAGNOSIS — K31.1 ADULT HYPERTROPHIC PYLORIC STENOSIS: ICD-10-CM

## 2017-09-22 DIAGNOSIS — R00.1 BRADYCARDIA, UNSPECIFIED: ICD-10-CM

## 2017-09-22 DIAGNOSIS — N18.4 CHRONIC KIDNEY DISEASE, STAGE 4 (SEVERE): ICD-10-CM

## 2017-09-22 DIAGNOSIS — K27.9 PEPTIC ULCER, SITE UNSPECIFIED, UNSPECIFIED AS ACUTE OR CHRONIC, WITHOUT HEMORRHAGE OR PERFORATION: ICD-10-CM

## 2017-09-22 DIAGNOSIS — Z91.040 LATEX ALLERGY STATUS: ICD-10-CM

## 2017-09-22 DIAGNOSIS — R09.02 HYPOXEMIA: ICD-10-CM

## 2017-09-22 DIAGNOSIS — R10.9 UNSPECIFIED ABDOMINAL PAIN: ICD-10-CM

## 2017-09-22 DIAGNOSIS — J90 PLEURAL EFFUSION, NOT ELSEWHERE CLASSIFIED: ICD-10-CM

## 2017-09-22 DIAGNOSIS — E11.9 TYPE 2 DIABETES MELLITUS WITHOUT COMPLICATIONS: ICD-10-CM

## 2017-09-22 DIAGNOSIS — I50.32 CHRONIC DIASTOLIC (CONGESTIVE) HEART FAILURE: ICD-10-CM

## 2017-09-22 DIAGNOSIS — E05.90 THYROTOXICOSIS, UNSPECIFIED WITHOUT THYROTOXIC CRISIS OR STORM: ICD-10-CM

## 2017-09-22 DIAGNOSIS — Z79.899 OTHER LONG TERM (CURRENT) DRUG THERAPY: ICD-10-CM

## 2017-09-22 DIAGNOSIS — Z85.3 PERSONAL HISTORY OF MALIGNANT NEOPLASM OF BREAST: ICD-10-CM

## 2017-09-22 DIAGNOSIS — C16.9 MALIGNANT NEOPLASM OF STOMACH, UNSPECIFIED: ICD-10-CM

## 2017-09-22 DIAGNOSIS — E43 UNSPECIFIED SEVERE PROTEIN-CALORIE MALNUTRITION: ICD-10-CM

## 2017-09-22 DIAGNOSIS — I13.0 HYPERTENSIVE HEART AND CHRONIC KIDNEY DISEASE WITH HEART FAILURE AND STAGE 1 THROUGH STAGE 4 CHRONIC KIDNEY DISEASE, OR UNSPECIFIED CHRONIC KIDNEY DISEASE: ICD-10-CM

## 2017-09-23 LAB
CULTURE RESULTS: SIGNIFICANT CHANGE UP
SPECIMEN SOURCE: SIGNIFICANT CHANGE UP

## 2017-10-18 LAB
CULTURE RESULTS: SIGNIFICANT CHANGE UP
SPECIMEN SOURCE: SIGNIFICANT CHANGE UP

## 2017-11-08 LAB
CULTURE RESULTS: SIGNIFICANT CHANGE UP
SPECIMEN SOURCE: SIGNIFICANT CHANGE UP

## 2017-11-14 ENCOUNTER — INPATIENT (INPATIENT)
Facility: HOSPITAL | Age: 67
LOS: 1 days | Discharge: HOME | End: 2017-11-16
Attending: STUDENT IN AN ORGANIZED HEALTH CARE EDUCATION/TRAINING PROGRAM | Admitting: STUDENT IN AN ORGANIZED HEALTH CARE EDUCATION/TRAINING PROGRAM
Payer: MEDICARE

## 2017-11-14 VITALS — WEIGHT: 147.93 LBS

## 2017-11-14 DIAGNOSIS — Z98.890 OTHER SPECIFIED POSTPROCEDURAL STATES: Chronic | ICD-10-CM

## 2017-11-14 DIAGNOSIS — S12.100A UNSPECIFIED DISPLACED FRACTURE OF SECOND CERVICAL VERTEBRA, INITIAL ENCOUNTER FOR CLOSED FRACTURE: Chronic | ICD-10-CM

## 2017-11-14 DIAGNOSIS — C16.9 MALIGNANT NEOPLASM OF STOMACH, UNSPECIFIED: ICD-10-CM

## 2017-11-14 DIAGNOSIS — Z29.9 ENCOUNTER FOR PROPHYLACTIC MEASURES, UNSPECIFIED: ICD-10-CM

## 2017-11-14 DIAGNOSIS — I50.32 CHRONIC DIASTOLIC (CONGESTIVE) HEART FAILURE: ICD-10-CM

## 2017-11-14 DIAGNOSIS — E78.5 HYPERLIPIDEMIA, UNSPECIFIED: ICD-10-CM

## 2017-11-14 DIAGNOSIS — Z90.49 ACQUIRED ABSENCE OF OTHER SPECIFIED PARTS OF DIGESTIVE TRACT: Chronic | ICD-10-CM

## 2017-11-14 DIAGNOSIS — I82.C12 ACUTE EMBOLISM AND THROMBOSIS OF LEFT INTERNAL JUGULAR VEIN: ICD-10-CM

## 2017-11-14 DIAGNOSIS — E11.9 TYPE 2 DIABETES MELLITUS WITHOUT COMPLICATIONS: ICD-10-CM

## 2017-11-14 DIAGNOSIS — I10 ESSENTIAL (PRIMARY) HYPERTENSION: ICD-10-CM

## 2017-11-14 DIAGNOSIS — N18.3 CHRONIC KIDNEY DISEASE, STAGE 3 (MODERATE): ICD-10-CM

## 2017-11-14 DIAGNOSIS — Z90.710 ACQUIRED ABSENCE OF BOTH CERVIX AND UTERUS: Chronic | ICD-10-CM

## 2017-11-14 DIAGNOSIS — E05.90 THYROTOXICOSIS, UNSPECIFIED WITHOUT THYROTOXIC CRISIS OR STORM: ICD-10-CM

## 2017-11-14 LAB
ALBUMIN SERPL ELPH-MCNC: 2.7 G/DL — LOW (ref 3.3–5)
ALLERGY+IMMUNOLOGY DIAG STUDY NOTE: SIGNIFICANT CHANGE UP
ALP SERPL-CCNC: 247 U/L — HIGH (ref 40–120)
ALT FLD-CCNC: 30 U/L — SIGNIFICANT CHANGE UP (ref 12–78)
ANION GAP SERPL CALC-SCNC: 5 MMOL/L — SIGNIFICANT CHANGE UP (ref 5–17)
ANISOCYTOSIS BLD QL: SLIGHT — SIGNIFICANT CHANGE UP
APTT BLD: 54.8 SEC — HIGH (ref 27.5–37.4)
AST SERPL-CCNC: 38 U/L — HIGH (ref 15–37)
BASOPHILS # BLD AUTO: 0.1 K/UL — SIGNIFICANT CHANGE UP (ref 0–0.2)
BILIRUB SERPL-MCNC: 0.6 MG/DL — SIGNIFICANT CHANGE UP (ref 0.2–1.2)
BUN SERPL-MCNC: 13 MG/DL — SIGNIFICANT CHANGE UP (ref 7–23)
CALCIUM SERPL-MCNC: 8.2 MG/DL — LOW (ref 8.5–10.1)
CHLORIDE SERPL-SCNC: 109 MMOL/L — HIGH (ref 96–108)
CO2 SERPL-SCNC: 26 MMOL/L — SIGNIFICANT CHANGE UP (ref 22–31)
CREAT SERPL-MCNC: 1.01 MG/DL — SIGNIFICANT CHANGE UP (ref 0.5–1.3)
EOSINOPHIL # BLD AUTO: 0.1 K/UL — SIGNIFICANT CHANGE UP (ref 0–0.5)
GLUCOSE SERPL-MCNC: 87 MG/DL — SIGNIFICANT CHANGE UP (ref 70–99)
HCT VFR BLD CALC: 31 % — LOW (ref 34.5–45)
HGB BLD-MCNC: 10.1 G/DL — LOW (ref 11.5–15.5)
INR BLD: 1.15 RATIO — SIGNIFICANT CHANGE UP (ref 0.88–1.16)
LYMPHOCYTES # BLD AUTO: 1.6 K/UL — SIGNIFICANT CHANGE UP (ref 1–3.3)
LYMPHOCYTES # BLD AUTO: 7 % — LOW (ref 13–44)
MANUAL DIF COMMENT BLD-IMP: SIGNIFICANT CHANGE UP
MCHC RBC-ENTMCNC: 26.9 PG — LOW (ref 27–34)
MCHC RBC-ENTMCNC: 32.6 GM/DL — SIGNIFICANT CHANGE UP (ref 32–36)
MCV RBC AUTO: 82.8 FL — SIGNIFICANT CHANGE UP (ref 80–100)
METAMYELOCYTES # FLD: 2 % — HIGH (ref 0–0)
MICROCYTES BLD QL: SLIGHT — SIGNIFICANT CHANGE UP
MONOCYTES # BLD AUTO: 0.8 K/UL — SIGNIFICANT CHANGE UP (ref 0–0.9)
MONOCYTES NFR BLD AUTO: 8 % — SIGNIFICANT CHANGE UP (ref 2–14)
NEUTROPHILS # BLD AUTO: 8.5 K/UL — HIGH (ref 1.8–7.4)
NEUTROPHILS NFR BLD AUTO: 81 % — HIGH (ref 43–77)
NEUTS BAND # BLD: 2 % — SIGNIFICANT CHANGE UP (ref 0–8)
OVALOCYTES BLD QL SMEAR: SLIGHT — SIGNIFICANT CHANGE UP
PLAT MORPH BLD: NORMAL — SIGNIFICANT CHANGE UP
PLATELET # BLD AUTO: 110 K/UL — LOW (ref 150–400)
POIKILOCYTOSIS BLD QL AUTO: SLIGHT — SIGNIFICANT CHANGE UP
POTASSIUM SERPL-MCNC: 3.5 MMOL/L — SIGNIFICANT CHANGE UP (ref 3.5–5.3)
POTASSIUM SERPL-SCNC: 3.5 MMOL/L — SIGNIFICANT CHANGE UP (ref 3.5–5.3)
PROT SERPL-MCNC: 5.7 GM/DL — LOW (ref 6–8.3)
PROTHROM AB SERPL-ACNC: 12.5 SEC — SIGNIFICANT CHANGE UP (ref 9.8–12.7)
RBC # BLD: 3.75 M/UL — LOW (ref 3.8–5.2)
RBC # FLD: 24.6 % — HIGH (ref 10.3–14.5)
RBC BLD AUTO: (no result)
SODIUM SERPL-SCNC: 140 MMOL/L — SIGNIFICANT CHANGE UP (ref 135–145)
WBC # BLD: 11.2 K/UL — HIGH (ref 3.8–10.5)
WBC # FLD AUTO: 11.2 K/UL — HIGH (ref 3.8–10.5)

## 2017-11-14 PROCEDURE — 93010 ELECTROCARDIOGRAM REPORT: CPT

## 2017-11-14 PROCEDURE — 99285 EMERGENCY DEPT VISIT HI MDM: CPT

## 2017-11-14 PROCEDURE — 71010: CPT | Mod: 26

## 2017-11-14 PROCEDURE — 93971 EXTREMITY STUDY: CPT | Mod: 26,LT

## 2017-11-14 RX ORDER — PANTOPRAZOLE SODIUM 20 MG/1
1 TABLET, DELAYED RELEASE ORAL
Qty: 0 | Refills: 0 | COMMUNITY

## 2017-11-14 RX ORDER — HEPARIN SODIUM 5000 [USP'U]/ML
INJECTION INTRAVENOUS; SUBCUTANEOUS
Qty: 25000 | Refills: 0 | Status: DISCONTINUED | OUTPATIENT
Start: 2017-11-14 | End: 2017-11-14

## 2017-11-14 RX ORDER — LISINOPRIL 2.5 MG/1
10 TABLET ORAL ONCE
Qty: 0 | Refills: 0 | Status: COMPLETED | OUTPATIENT
Start: 2017-11-14 | End: 2017-11-14

## 2017-11-14 RX ORDER — DOCUSATE SODIUM 100 MG
100 CAPSULE ORAL THREE TIMES A DAY
Qty: 0 | Refills: 0 | Status: DISCONTINUED | OUTPATIENT
Start: 2017-11-14 | End: 2017-11-16

## 2017-11-14 RX ORDER — OXYCODONE HYDROCHLORIDE 5 MG/1
0 TABLET ORAL
Qty: 0 | Refills: 0 | COMMUNITY

## 2017-11-14 RX ORDER — AMLODIPINE BESYLATE 2.5 MG/1
1 TABLET ORAL
Qty: 0 | Refills: 0 | COMMUNITY

## 2017-11-14 RX ORDER — HEPARIN SODIUM 5000 [USP'U]/ML
2500 INJECTION INTRAVENOUS; SUBCUTANEOUS EVERY 6 HOURS
Qty: 0 | Refills: 0 | Status: DISCONTINUED | OUTPATIENT
Start: 2017-11-14 | End: 2017-11-14

## 2017-11-14 RX ORDER — HEPARIN SODIUM 5000 [USP'U]/ML
5500 INJECTION INTRAVENOUS; SUBCUTANEOUS EVERY 6 HOURS
Qty: 0 | Refills: 0 | Status: DISCONTINUED | OUTPATIENT
Start: 2017-11-14 | End: 2017-11-14

## 2017-11-14 RX ORDER — ONDANSETRON 8 MG/1
4 TABLET, FILM COATED ORAL EVERY 6 HOURS
Qty: 0 | Refills: 0 | Status: DISCONTINUED | OUTPATIENT
Start: 2017-11-14 | End: 2017-11-16

## 2017-11-14 RX ORDER — AMLODIPINE BESYLATE 2.5 MG/1
10 TABLET ORAL DAILY
Qty: 0 | Refills: 0 | Status: DISCONTINUED | OUTPATIENT
Start: 2017-11-15 | End: 2017-11-16

## 2017-11-14 RX ORDER — PROCHLORPERAZINE MALEATE 5 MG
10 TABLET ORAL
Qty: 0 | Refills: 0 | Status: DISCONTINUED | OUTPATIENT
Start: 2017-11-14 | End: 2017-11-16

## 2017-11-14 RX ORDER — HEPARIN SODIUM 5000 [USP'U]/ML
2500 INJECTION INTRAVENOUS; SUBCUTANEOUS EVERY 6 HOURS
Qty: 0 | Refills: 0 | Status: DISCONTINUED | OUTPATIENT
Start: 2017-11-14 | End: 2017-11-15

## 2017-11-14 RX ORDER — ONDANSETRON 8 MG/1
0 TABLET, FILM COATED ORAL
Qty: 0 | Refills: 0 | COMMUNITY

## 2017-11-14 RX ORDER — SIMVASTATIN 20 MG/1
10 TABLET, FILM COATED ORAL AT BEDTIME
Qty: 0 | Refills: 0 | Status: DISCONTINUED | OUTPATIENT
Start: 2017-11-14 | End: 2017-11-16

## 2017-11-14 RX ORDER — HYDRALAZINE HCL 50 MG
10 TABLET ORAL
Qty: 0 | Refills: 0 | Status: DISCONTINUED | OUTPATIENT
Start: 2017-11-14 | End: 2017-11-15

## 2017-11-14 RX ORDER — HEPARIN SODIUM 5000 [USP'U]/ML
5000 INJECTION INTRAVENOUS; SUBCUTANEOUS ONCE
Qty: 0 | Refills: 0 | Status: COMPLETED | OUTPATIENT
Start: 2017-11-14 | End: 2017-11-14

## 2017-11-14 RX ORDER — AMLODIPINE BESYLATE 2.5 MG/1
10 TABLET ORAL ONCE
Qty: 0 | Refills: 0 | Status: COMPLETED | OUTPATIENT
Start: 2017-11-14 | End: 2017-11-14

## 2017-11-14 RX ORDER — NIFEDIPINE 30 MG
30 TABLET, EXTENDED RELEASE 24 HR ORAL ONCE
Qty: 0 | Refills: 0 | Status: DISCONTINUED | OUTPATIENT
Start: 2017-11-14 | End: 2017-11-14

## 2017-11-14 RX ORDER — FUROSEMIDE 40 MG
1 TABLET ORAL
Qty: 0 | Refills: 0 | COMMUNITY

## 2017-11-14 RX ORDER — HEPARIN SODIUM 5000 [USP'U]/ML
INJECTION INTRAVENOUS; SUBCUTANEOUS
Qty: 25000 | Refills: 0 | Status: DISCONTINUED | OUTPATIENT
Start: 2017-11-14 | End: 2017-11-15

## 2017-11-14 RX ORDER — ACETAMINOPHEN 500 MG
650 TABLET ORAL EVERY 6 HOURS
Qty: 0 | Refills: 0 | Status: DISCONTINUED | OUTPATIENT
Start: 2017-11-14 | End: 2017-11-16

## 2017-11-14 RX ORDER — HYDRALAZINE HCL 50 MG
10 TABLET ORAL ONCE
Qty: 0 | Refills: 0 | Status: COMPLETED | OUTPATIENT
Start: 2017-11-14 | End: 2017-11-14

## 2017-11-14 RX ORDER — SENNA PLUS 8.6 MG/1
2 TABLET ORAL AT BEDTIME
Qty: 0 | Refills: 0 | Status: DISCONTINUED | OUTPATIENT
Start: 2017-11-14 | End: 2017-11-16

## 2017-11-14 RX ORDER — HEPARIN SODIUM 5000 [USP'U]/ML
5000 INJECTION INTRAVENOUS; SUBCUTANEOUS EVERY 6 HOURS
Qty: 0 | Refills: 0 | Status: DISCONTINUED | OUTPATIENT
Start: 2017-11-14 | End: 2017-11-15

## 2017-11-14 RX ORDER — LISINOPRIL 2.5 MG/1
1 TABLET ORAL
Qty: 0 | Refills: 0 | COMMUNITY

## 2017-11-14 RX ORDER — HEPARIN SODIUM 5000 [USP'U]/ML
5500 INJECTION INTRAVENOUS; SUBCUTANEOUS ONCE
Qty: 0 | Refills: 0 | Status: DISCONTINUED | OUTPATIENT
Start: 2017-11-14 | End: 2017-11-14

## 2017-11-14 RX ORDER — FAMOTIDINE 10 MG/ML
20 INJECTION INTRAVENOUS DAILY
Qty: 0 | Refills: 0 | Status: DISCONTINUED | OUTPATIENT
Start: 2017-11-15 | End: 2017-11-16

## 2017-11-14 RX ORDER — OXYCODONE HYDROCHLORIDE 5 MG/1
5 TABLET ORAL EVERY 4 HOURS
Qty: 0 | Refills: 0 | Status: DISCONTINUED | OUTPATIENT
Start: 2017-11-14 | End: 2017-11-16

## 2017-11-14 RX ADMIN — HEPARIN SODIUM 5000 UNIT(S): 5000 INJECTION INTRAVENOUS; SUBCUTANEOUS at 17:22

## 2017-11-14 RX ADMIN — AMLODIPINE BESYLATE 10 MILLIGRAM(S): 2.5 TABLET ORAL at 13:13

## 2017-11-14 RX ADMIN — HEPARIN SODIUM 1100 UNIT(S)/HR: 5000 INJECTION INTRAVENOUS; SUBCUTANEOUS at 17:22

## 2017-11-14 RX ADMIN — Medication 10 MILLIGRAM(S): at 20:45

## 2017-11-14 RX ADMIN — Medication 10 MILLIGRAM(S): at 23:51

## 2017-11-14 RX ADMIN — LISINOPRIL 10 MILLIGRAM(S): 2.5 TABLET ORAL at 13:13

## 2017-11-14 RX ADMIN — OXYCODONE HYDROCHLORIDE 5 MILLIGRAM(S): 5 TABLET ORAL at 23:45

## 2017-11-14 RX ADMIN — OXYCODONE HYDROCHLORIDE 5 MILLIGRAM(S): 5 TABLET ORAL at 23:15

## 2017-11-14 RX ADMIN — Medication 10 MILLIGRAM(S): at 20:36

## 2017-11-14 NOTE — ED PROVIDER NOTE - MEDICAL DECISION MAKING DETAILS
Patient sent in by Dr. Lucas with L IJ thrombus on outside study.  Pt with extremity swelling and intermittent dizziness, otherwise asymptomatic.  Plan for sonogram and Vascular consult.

## 2017-11-14 NOTE — ED ADULT TRIAGE NOTE - CHIEF COMPLAINT QUOTE
Pt sent to ED by Dr Lucas (Endocrinology) for "clot on the left neck artery". Pt's daughter reports pt had arteriogram yesterday and was called today with results. Pt is currently undergoing chemo for gastric ca. Pt's last chemo was 2 weeks ago. Pt denies CP and SOB

## 2017-11-14 NOTE — H&P ADULT - PROBLEM SELECTOR PLAN 3
~FS qAC  ~cont. ISS per protocol ~cont. Lisinopril 10mg po daily  ~cont. Amlodipine 10mg po daily ~cont. Amlodipine 10mg po daily ~cont. Amlodipine 10mg po daily  ~was taken off Lisinopril 10mg po daily; in the ED the patient was notably hypertensive; SBP 180s - 200s; Hydralazine IV was given....  ~cont. Hydralazine 10mg po qid

## 2017-11-14 NOTE — ED PROVIDER NOTE - CARDIAC, MLM
Normal rate, regular rhythm.  Heart sounds S1, S2. Normal rate, regular rhythm.  Heart sounds S1, S2.  3+pitting edema to bilateral LE and UE.  no carotid bruit.

## 2017-11-14 NOTE — H&P ADULT - NSHPPHYSICALEXAM_GEN_ALL_CORE
Vital Signs Last 24 Hrs  T(C): 36.9 (14 Nov 2017 15:02), Max: 36.9 (14 Nov 2017 15:02)  T(F): 98.4 (14 Nov 2017 15:02), Max: 98.4 (14 Nov 2017 15:02)  HR: 92 (14 Nov 2017 15:02) (77 - 98)  BP: 185/115 (14 Nov 2017 15:02) (185/115 - 218/113)  RR: 18 (14 Nov 2017 15:02) (16 - 20)  SpO2: 97% (14 Nov 2017 15:02) (95% - 98%)

## 2017-11-14 NOTE — ED PROVIDER NOTE - PROGRESS NOTE DETAILS
Received US of thyroid results from Dr. Lucas after multiple attempts. Will contact vascular surgery. - Renard Garrett MD spoke to vascular, dr ferguson, updated on findings form outside study. he recommends US to be performed in house to eval if acute or chronic. if is chronic will not recommend anticoagulation. will obtain us and discuss again with marty. - Renard Garrett MD spoke to dr ferguson, vascular surgery, regarding US results at [place]. per dr ahmadi pt can be started on lovenox and d/c'd home. instructed to f/u with pmd. will refer to f/u with pmd and marty. - Renard Garrett MD pt with chronically elevated cr recently. although cr today not elevated, concern for potential renal impairment and giving lovenox. plan to admit with heparin gtt. - Renard Garrett MD

## 2017-11-14 NOTE — H&P ADULT - PSH
H/O: hysterectomy    S/P cholecystectomy C2 cervical fracture  s/p Fusion  H/O: hysterectomy    History of lymph node dissection of both axillae    History of thoracentesis  3/2017  S/P cholecystectomy

## 2017-11-14 NOTE — ED STATDOCS - PSH
C2 cervical fracture  s/p Fusion  H/O: hysterectomy    History of lymph node dissection of both axillae    History of thoracentesis  3/2017  S/P cholecystectomy

## 2017-11-14 NOTE — ED PROVIDER NOTE - OBJECTIVE STATEMENT
67 yo female PMH breast CA x2, gastric CA currently undergoing chemo treatment (last time 2 weeks ago, was due today) sent in by Endocrinologist Dr. Lucas for incidental finding of thrombus in left carotid artery on thyroid US.  Has peripheral edema and intermittent dizziness but otherwise feels well and denies neck pain, chest pain, sob, f/c, n/v/d.  No history of DVT/PE.  Has pleural effusion which is usually drained every other day.

## 2017-11-14 NOTE — H&P ADULT - HISTORY OF PRESENT ILLNESS
65 y/o F PMHx significant for Breast CA s/p B/L lumpectomies w/ LN dissection, CKD3, DM type2, diastolic CHF (HFpEF), hx of hyperthyroidism, gastric carcinoma on chemo (last tx was 2 wks ago; was due today) who was referred to the ED by her Endocrinologist for further evaluation and management of an acute Left IJ thrombus found on an outpatient "ultrasound." Repeat imaging in the ED confirmed an occlusive thrombus in the left internal jugular vein. In the ED the patient was started on a Heparin gtt per protocol.

## 2017-11-14 NOTE — H&P ADULT - PROBLEM SELECTOR PLAN 7
~currently compensated   ~cont. to monitor ~currently compensated   ~cont. to monitor  ~takes Furosemide 20mg po daily w/ K supplementation -> will hold for now - re-evaluate in the am...

## 2017-11-14 NOTE — ED STATDOCS - PROGRESS NOTE DETAILS
Luly Ruiz Stack: 65 y/o F with a PMHx of gastric CA, receiving chemo tx with last round about x2 weeks ago presents to the ED being sent in Dr Lucas (Endocrinology) for "clot on the left neck artery" found on arteriogram yesterday and was called today with results. Pt currently calm and being sent to the Main for further eval and tx.

## 2017-11-14 NOTE — H&P ADULT - ASSESSMENT
67 y/o F PMHx significant for Breast CA s/p B/L lumpectomies w/ LN dissection, CKD3, DM type2, diastolic CHF (HFpEF), hx of hyperthyroidism, gastric carcinoma on chemo (last tx was 2 wks ago; was due today) who was referred to the ED by her Endocrinologist for further evaluation and management of an acute Left IJ thrombus found on an outpatient "ultrasound." Repeat imaging in the ED confirmed an occlusive thrombus in the left internal jugular vein.

## 2017-11-14 NOTE — ED STATDOCS - PMH
Breast CA    CKD (chronic kidney disease) stage 3, GFR 30-59 ml/min    Diabetes    Diastolic CHF, chronic    Gastric carcinoma    Hyperthyroidism    Pleural effusion

## 2017-11-14 NOTE — H&P ADULT - PROBLEM SELECTOR PLAN 5
~Renal fxn - Cr at this time improved from prior  ~cont. to monitor ~cont. statin therapy w/ Simvastatin 10mg po qHS

## 2017-11-14 NOTE — H&P ADULT - NSHPOUTPATIENTPROVIDERS_GEN_ALL_CORE
PCP; Dr. Sue PCP; Dr. Sue  Pulmonary; Dr. Demetrice ORLANDO; Dr. Manning PCP; Dr. Sue  Pulmonary; Dr. Demetrice ORLANDO; Dr. Manning  Endocrinology; Dr. Lucas

## 2017-11-15 LAB
ALBUMIN SERPL ELPH-MCNC: 2.3 G/DL — LOW (ref 3.3–5)
ALP SERPL-CCNC: 213 U/L — HIGH (ref 40–120)
ALT FLD-CCNC: 26 U/L — SIGNIFICANT CHANGE UP (ref 12–78)
ANION GAP SERPL CALC-SCNC: 5 MMOL/L — SIGNIFICANT CHANGE UP (ref 5–17)
ANISOCYTOSIS BLD QL: SLIGHT — SIGNIFICANT CHANGE UP
APTT BLD: 162.2 SEC — CRITICAL HIGH (ref 27.5–37.4)
APTT BLD: 37.1 SEC — SIGNIFICANT CHANGE UP (ref 27.5–37.4)
APTT BLD: 80.4 SEC — HIGH (ref 27.5–37.4)
APTT BLD: 86.1 SEC — HIGH (ref 27.5–37.4)
AST SERPL-CCNC: 35 U/L — SIGNIFICANT CHANGE UP (ref 15–37)
BASOPHILS # BLD AUTO: 0.2 K/UL — SIGNIFICANT CHANGE UP (ref 0–0.2)
BASOPHILS NFR BLD AUTO: 1.5 % — SIGNIFICANT CHANGE UP (ref 0–2)
BILIRUB SERPL-MCNC: 0.6 MG/DL — SIGNIFICANT CHANGE UP (ref 0.2–1.2)
BUN SERPL-MCNC: 12 MG/DL — SIGNIFICANT CHANGE UP (ref 7–23)
CALCIUM SERPL-MCNC: 8.2 MG/DL — LOW (ref 8.5–10.1)
CHLORIDE SERPL-SCNC: 107 MMOL/L — SIGNIFICANT CHANGE UP (ref 96–108)
CO2 SERPL-SCNC: 27 MMOL/L — SIGNIFICANT CHANGE UP (ref 22–31)
CREAT SERPL-MCNC: 1.05 MG/DL — SIGNIFICANT CHANGE UP (ref 0.5–1.3)
EOSINOPHIL # BLD AUTO: 0.2 K/UL — SIGNIFICANT CHANGE UP (ref 0–0.5)
EOSINOPHIL NFR BLD AUTO: 1.5 % — SIGNIFICANT CHANGE UP (ref 0–6)
GLUCOSE SERPL-MCNC: 84 MG/DL — SIGNIFICANT CHANGE UP (ref 70–99)
HCT VFR BLD CALC: 30.4 % — LOW (ref 34.5–45)
HCT VFR BLD CALC: 30.9 % — LOW (ref 34.5–45)
HGB BLD-MCNC: 10.1 G/DL — LOW (ref 11.5–15.5)
HGB BLD-MCNC: 9.4 G/DL — LOW (ref 11.5–15.5)
HYPOCHROMIA BLD QL: SLIGHT — SIGNIFICANT CHANGE UP
INR BLD: 1.19 RATIO — HIGH (ref 0.88–1.16)
LYMPHOCYTES # BLD AUTO: 19.8 % — SIGNIFICANT CHANGE UP (ref 13–44)
LYMPHOCYTES # BLD AUTO: 2.1 K/UL — SIGNIFICANT CHANGE UP (ref 1–3.3)
MAGNESIUM SERPL-MCNC: 1.8 MG/DL — SIGNIFICANT CHANGE UP (ref 1.6–2.6)
MANUAL DIF COMMENT BLD-IMP: SIGNIFICANT CHANGE UP
MCHC RBC-ENTMCNC: 25.7 PG — LOW (ref 27–34)
MCHC RBC-ENTMCNC: 27.3 PG — SIGNIFICANT CHANGE UP (ref 27–34)
MCHC RBC-ENTMCNC: 30.8 GM/DL — LOW (ref 32–36)
MCHC RBC-ENTMCNC: 32.8 GM/DL — SIGNIFICANT CHANGE UP (ref 32–36)
MCV RBC AUTO: 83.2 FL — SIGNIFICANT CHANGE UP (ref 80–100)
MCV RBC AUTO: 83.5 FL — SIGNIFICANT CHANGE UP (ref 80–100)
MICROCYTES BLD QL: SLIGHT — SIGNIFICANT CHANGE UP
MONOCYTES # BLD AUTO: 1.2 K/UL — HIGH (ref 0–0.9)
MONOCYTES NFR BLD AUTO: 11.8 % — SIGNIFICANT CHANGE UP (ref 2–14)
NEUTROPHILS # BLD AUTO: 6.8 K/UL — SIGNIFICANT CHANGE UP (ref 1.8–7.4)
NEUTROPHILS NFR BLD AUTO: 65.4 % — SIGNIFICANT CHANGE UP (ref 43–77)
OVALOCYTES BLD QL SMEAR: SLIGHT — SIGNIFICANT CHANGE UP
PLAT MORPH BLD: NORMAL — SIGNIFICANT CHANGE UP
PLATELET # BLD AUTO: 120 K/UL — LOW (ref 150–400)
PLATELET # BLD AUTO: 126 K/UL — LOW (ref 150–400)
POIKILOCYTOSIS BLD QL AUTO: SLIGHT — SIGNIFICANT CHANGE UP
POLYCHROMASIA BLD QL SMEAR: SLIGHT — SIGNIFICANT CHANGE UP
POTASSIUM SERPL-MCNC: 3.6 MMOL/L — SIGNIFICANT CHANGE UP (ref 3.5–5.3)
POTASSIUM SERPL-SCNC: 3.6 MMOL/L — SIGNIFICANT CHANGE UP (ref 3.5–5.3)
PROT SERPL-MCNC: 5 GM/DL — LOW (ref 6–8.3)
PROTHROM AB SERPL-ACNC: 12.9 SEC — HIGH (ref 9.8–12.7)
RBC # BLD: 3.64 M/UL — LOW (ref 3.8–5.2)
RBC # BLD: 3.71 M/UL — LOW (ref 3.8–5.2)
RBC # FLD: 25.4 % — HIGH (ref 10.3–14.5)
RBC # FLD: 25.5 % — HIGH (ref 10.3–14.5)
RBC BLD AUTO: (no result)
SODIUM SERPL-SCNC: 139 MMOL/L — SIGNIFICANT CHANGE UP (ref 135–145)
WBC # BLD: 10.4 K/UL — SIGNIFICANT CHANGE UP (ref 3.8–10.5)
WBC # BLD: 11.7 K/UL — HIGH (ref 3.8–10.5)
WBC # FLD AUTO: 10.4 K/UL — SIGNIFICANT CHANGE UP (ref 3.8–10.5)
WBC # FLD AUTO: 11.7 K/UL — HIGH (ref 3.8–10.5)

## 2017-11-15 PROCEDURE — 99223 1ST HOSP IP/OBS HIGH 75: CPT

## 2017-11-15 RX ORDER — ENOXAPARIN SODIUM 100 MG/ML
60 INJECTION SUBCUTANEOUS
Qty: 36 | Refills: 0 | OUTPATIENT
Start: 2017-11-15 | End: 2017-12-15

## 2017-11-15 RX ORDER — ENOXAPARIN SODIUM 100 MG/ML
60 INJECTION SUBCUTANEOUS
Qty: 6 | Refills: 1 | OUTPATIENT
Start: 2017-11-15 | End: 2017-11-24

## 2017-11-15 RX ORDER — METOPROLOL TARTRATE 50 MG
25 TABLET ORAL
Qty: 0 | Refills: 0 | Status: DISCONTINUED | OUTPATIENT
Start: 2017-11-15 | End: 2017-11-16

## 2017-11-15 RX ORDER — WARFARIN SODIUM 2.5 MG/1
1 TABLET ORAL
Qty: 30 | Refills: 0 | OUTPATIENT
Start: 2017-11-15 | End: 2017-12-15

## 2017-11-15 RX ORDER — POLYETHYLENE GLYCOL 3350 17 G/17G
17 POWDER, FOR SOLUTION ORAL DAILY
Qty: 0 | Refills: 0 | Status: DISCONTINUED | OUTPATIENT
Start: 2017-11-15 | End: 2017-11-16

## 2017-11-15 RX ORDER — WARFARIN SODIUM 2.5 MG/1
5 TABLET ORAL DAILY
Qty: 0 | Refills: 0 | Status: DISCONTINUED | OUTPATIENT
Start: 2017-11-15 | End: 2017-11-16

## 2017-11-15 RX ORDER — ENOXAPARIN SODIUM 100 MG/ML
60 INJECTION SUBCUTANEOUS EVERY 12 HOURS
Qty: 0 | Refills: 0 | Status: DISCONTINUED | OUTPATIENT
Start: 2017-11-15 | End: 2017-11-16

## 2017-11-15 RX ADMIN — HEPARIN SODIUM 900 UNIT(S)/HR: 5000 INJECTION INTRAVENOUS; SUBCUTANEOUS at 02:31

## 2017-11-15 RX ADMIN — OXYCODONE HYDROCHLORIDE 5 MILLIGRAM(S): 5 TABLET ORAL at 22:30

## 2017-11-15 RX ADMIN — Medication 25 MILLIGRAM(S): at 18:21

## 2017-11-15 RX ADMIN — AMLODIPINE BESYLATE 10 MILLIGRAM(S): 2.5 TABLET ORAL at 06:10

## 2017-11-15 RX ADMIN — FAMOTIDINE 20 MILLIGRAM(S): 10 INJECTION INTRAVENOUS at 14:07

## 2017-11-15 RX ADMIN — OXYCODONE HYDROCHLORIDE 5 MILLIGRAM(S): 5 TABLET ORAL at 04:15

## 2017-11-15 RX ADMIN — ENOXAPARIN SODIUM 60 MILLIGRAM(S): 100 INJECTION SUBCUTANEOUS at 18:50

## 2017-11-15 RX ADMIN — Medication 10 MILLIGRAM(S): at 06:10

## 2017-11-15 RX ADMIN — WARFARIN SODIUM 5 MILLIGRAM(S): 2.5 TABLET ORAL at 21:47

## 2017-11-15 RX ADMIN — OXYCODONE HYDROCHLORIDE 5 MILLIGRAM(S): 5 TABLET ORAL at 03:39

## 2017-11-15 RX ADMIN — Medication 1 TABLET(S): at 06:10

## 2017-11-15 RX ADMIN — SIMVASTATIN 10 MILLIGRAM(S): 20 TABLET, FILM COATED ORAL at 00:01

## 2017-11-15 RX ADMIN — SIMVASTATIN 10 MILLIGRAM(S): 20 TABLET, FILM COATED ORAL at 21:47

## 2017-11-15 RX ADMIN — Medication 1 TABLET(S): at 18:21

## 2017-11-15 RX ADMIN — OXYCODONE HYDROCHLORIDE 5 MILLIGRAM(S): 5 TABLET ORAL at 21:46

## 2017-11-15 RX ADMIN — HEPARIN SODIUM 900 UNIT(S)/HR: 5000 INJECTION INTRAVENOUS; SUBCUTANEOUS at 09:45

## 2017-11-15 RX ADMIN — HEPARIN SODIUM 0 UNIT(S)/HR: 5000 INJECTION INTRAVENOUS; SUBCUTANEOUS at 01:23

## 2017-11-15 NOTE — PATIENT PROFILE ADULT. - CHRONIC PAIN COMMENT, PROFILE
occasional sharp upper abdominal pain relieved with Oxycodone 5mg po but tries not to take analgesic.

## 2017-11-15 NOTE — PROGRESS NOTE ADULT - SUBJECTIVE AND OBJECTIVE BOX
Patient is a 66y old  Female who presents with a chief complaint of Abnormal test result. (14 Nov 2017 17:56)      HPI:  65 y/o F PMHx significant for Breast CA s/p B/L lumpectomies w/ LN dissection, CKD3, DM type2, diastolic CHF (HFpEF), hx of hyperthyroidism, gastric carcinoma on chemo (last tx was 2 wks ago; was due today) who was referred to the ED by her Endocrinologist for further evaluation and management of an acute Left IJ thrombus found on an outpatient "ultrasound." Repeat imaging in the ED confirmed an occlusive thrombus in the left internal jugular vein. In the ED the patient was started on a Heparin gtt per protocol. (14 Nov 2017 17:56)    11/15: No complaints, denies upper extremity pain or discomfort. On heparin gtt and tolerating.    Review of system- Rest of the review of system are normal except mentioned in HPI    Vital Signs Last 24 Hrs  T(C): 36.9 (15 Nov 2017 12:14), Max: 37.1 (14 Nov 2017 23:51)  T(F): 98.5 (15 Nov 2017 12:14), Max: 98.7 (14 Nov 2017 23:51)  HR: 97 (15 Nov 2017 12:14) (92 - 108)  BP: 141/76 (15 Nov 2017 12:14) (120/101 - 191/106)  BP(mean): --  RR: 17 (15 Nov 2017 12:14) (17 - 20)  SpO2: 95% (15 Nov 2017 12:14) (94% - 98%)    PHYSICAL EXAM:    Constitutional: NAD, awake and alert, well-developed  HEENT: PERR, EOMI, Normal Hearing, MMM  Neck: Soft and supple  Respiratory: Breath sounds are clear bilaterally, No wheezing, rales or rhonchi  Cardiovascular: S1 and S2, regular rate and rhythm, no Murmurs, gallops or rubs  Gastrointestinal: Bowel Sounds present, soft, nontender, nondistended, no guarding, no rebound  Extremities: No peripheral edema  Neurological: A/O x 3, no focal deficits  Skin: No rashes    MEDICATIONS  (STANDING):  amLODIPine   Tablet 10 milliGRAM(s) Oral daily  calcium carbonate  625 mG + Vitamin D (OsCal 250 + D) 1 Tablet(s) Oral two times a day  enoxaparin Injectable 60 milliGRAM(s) SubCutaneous every 12 hours  famotidine    Tablet 20 milliGRAM(s) Oral daily  heparin  Infusion.  Unit(s)/Hr (11 mL/Hr) IV Continuous <Continuous>  methimazole 10 milliGRAM(s) Oral daily  metoprolol     tartrate 25 milliGRAM(s) Oral two times a day  simvastatin 10 milliGRAM(s) Oral at bedtime  warfarin 5 milliGRAM(s) Oral daily    MEDICATIONS  (PRN):  acetaminophen   Tablet 650 milliGRAM(s) Oral every 6 hours PRN For Temp greater than 38 C (100.4 F)  acetaminophen   Tablet. 650 milliGRAM(s) Oral every 6 hours PRN Mild Pain (1 - 3)  docusate sodium 100 milliGRAM(s) Oral three times a day PRN Constipation  heparin  Injectable 5000 Unit(s) IV Push every 6 hours PRN For aPTT less than 40  heparin  Injectable 2500 Unit(s) IV Push every 6 hours PRN For aPTT between 40 - 57  ondansetron Injectable 4 milliGRAM(s) IV Push every 6 hours PRN Nausea  oxyCODONE    IR 5 milliGRAM(s) Oral every 4 hours PRN Moderate Pain (4 - 6)  prochlorperazine   Tablet 10 milliGRAM(s) Oral four times a day PRN Nausea  senna 2 Tablet(s) Oral at bedtime PRN Constipation                              9.4    10.4  )-----------( 126      ( 15 Nov 2017 05:37 )             30.4     11-15    139  |  107  |  12  ----------------------------<  84  3.6   |  27  |  1.05    Ca    8.2<L>      15 Nov 2017 05:37  Mg     1.8     11-15    TPro  5.0<L>  /  Alb  2.3<L>  /  TBili  0.6  /  DBili  x   /  AST  35  /  ALT  26  /  AlkPhos  213<H>  11-15    CAPILLARY BLOOD GLUCOSE        LIVER FUNCTIONS - ( 15 Nov 2017 05:37 )  Alb: 2.3 g/dL / Pro: 5.0 gm/dL / ALK PHOS: 213 U/L / ALT: 26 U/L / AST: 35 U/L / GGT: x           PT/INR - ( 14 Nov 2017 11:49 )   PT: 12.5 sec;   INR: 1.15 ratio         PTT - ( 15 Nov 2017 08:18 )  PTT:86.1 sec        Assessment and Plan:  65 y/o F PMHx significant for Breast CA s/p B/L lumpectomies w/ LN dissection, CKD3, DM type2, diastolic CHF (HFpEF), hx of hyperthyroidism, gastric carcinoma on chemo (last tx was 2 wks ago; was due today) who was referred to the ED by her Endocrinologist for further evaluation and management of an acute Left IJ thrombus found on an outpatient "ultrasound."    Internal jugular vein thrombosis: Asymptomatic:  -Repeat imaging in the ED confirmed an occlusive thrombus in the left internal jugular vein.   -change heparin gtt to lovenox per anticoagulation services  -monitor today, and discharge tomorrow if doing well.    Gastric carcinoma.    -cont. pain management prn.   -outpt oncology f/u.    Essential hypertension.    -cont. Amlodipine 10mg po daily  -was taken off Lisinopril 10mg po daily; in the ED the patient was notably hypertensive; SBP 180s - 200s; Hydralazine IV was given  -start lopressor BID and monitor. WIll stop hydralazine given tachycardia.    thrombocytopenia:  -monitor    hypoalbuminemia: Likely secondary to chronic disease state.  -encourage po intake, increase protein intake.    Hyperthyroidism.    -cont. Methimazole.      Hyperlipemia.    -cont. statin therapy w/ Simvastatin 10mg po qHS.     Diabetes.  -FS qAC  -cont. ISS per protocol.    Diastolic CHF, chronic - compensated:  -takes Furosemide 20mg po daily w/ K supplementation -> will hold for now.    CKD (chronic kidney disease) stage 3, GFR 30-59 ml/min.    -Stable/improved from last admisison.    Vte ppx  -lovenox    Attending Statement:  40 minutes spent on total encounter; more than 50% of the visit was spent counseling and/or coordinating care by the attending physician.

## 2017-11-15 NOTE — CONSULT NOTE ADULT - SUBJECTIVE AND OBJECTIVE BOX
HPI:  67 y/o F PMHx significant for Breast CA s/p B/L lumpectomies w/ LN dissection, CKD3, DM type2, diastolic CHF (HFpEF), hx of hyperthyroidism, gastric carcinoma on chemo (last tx was 2 wks ago; was due today) who was referred to the ED by her Endocrinologist for further evaluation and management of an acute Left IJ thrombus found on an outpatient "ultrasound." Repeat imaging in the ED confirmed an occlusive thrombus in the left internal jugular vein. In the ED the patient was started on a Heparin gtt per protocol. (14 Nov 2017 17:56)      Patient is a 66y old  Female who presents with a chief complaint of Abnormal test result. (14 Nov 2017 17:56)      Consulted by Dr. Win  for VTE prophylaxis, risk stratification, and anticoagulation management.    PAST MEDICAL & SURGICAL HISTORY:  Gastric carcinoma  CKD (chronic kidney disease) stage 3, GFR 30-59 ml/min  Diabetes  Hyperthyroidism  Diastolic CHF, chronic  Pleural effusion  Breast CA  History of thoracentesis: 3/2017  C2 cervical fracture: s/p Fusion  History of lymph node dissection of both axillae  S/P cholecystectomy  H/O: hysterectomy    doppler:    IMPRESSION: Occlusive thrombus in the LEFT internal jugular vein.    Chest Xray:   Portable AP radiograph of the chest is performed. comparison is made to   September 19 2017.    Left-sided infusion port is again seen with the tip overlying the   superior vena cava. Right axillary clips are again noted. The heart is   not enlarged. The trachea is midline. There is persistent small right   pleural effusion. There are bibasilar patchy densities as well.    Impression: Left infusion port is again seen.  Small right pleural effusion is again noted.  Bibasilar densities may represent infiltrates and/or pleural fluid        CrCl:59      IMPROVE VTE Risk Score: #6      IMPROVE Bleeding Risk Score #1.5    Falls Risk:   High (  )  Mod (x  )  Low (  )      FAMILY HISTORY:  No pertinent family history in first degree relatives    Denies any personal or familial history of clotting or bleeding disorders.    Allergies    latex (Unknown)  No Known Drug Allergies    Intolerances        REVIEW OF SYSTEMS    (  )Fever	     (  )Constipation	(  )SOB				(  )Headache	(  )Dysuria  (  )Chills	     (  )Melena	(  )Dyspnea present on exertion	                    (  )Dizziness                    (  )Polyuria  (  )Nausea	     (  )Hematochezia	(  )Cough			                    (  )Syncope   	(  )Hematuria  (  )Vomiting    (  )Chest Pain	(  )Wheezing			(  )Weakness  (  )Diarrhea     (  )Palpitations	(  )Anorexia			(  )Myalgia    All other review of systems negative: Yes    Unable to obtain review of systems due to:      PHYSICAL EXAM:    Constitutional: Appears Well    Neurological: A& O x 3    Skin: Warm    Respiratory and Thorax: normal effort; Breath sounds: normal; No rales/wheezing/rhonchi  	  Cardiovascular: S1, S2, regular, NMBR	    Gastrointestinal: BS + x 4Q, nontender	    Genitourinary:  Bladder nondistended, nontender    Musculoskeletal:   General Right:   no muscle/joint tenderness,   normal tone, no joint swelling,   ROM: limited/full	    General Left:   no muscle/joint tenderness,   normal tone, no joint swelling,   ROM: limited/full    Lower extrems:   Right: no calf tenderness              negative trav's sign               + pedal pulses    Left:   no calf tenderness              negative trav's sign               + pedal pulses                          9.4    10.4  )-----------( 126      ( 15 Nov 2017 05:37 )             30.4       11-15    139  |  107  |  12  ----------------------------<  84  3.6   |  27  |  1.05    Ca    8.2<L>      15 Nov 2017 05:37  Mg     1.8     11-15    TPro  5.0<L>  /  Alb  2.3<L>  /  TBili  0.6  /  DBili  x   /  AST  35  /  ALT  26  /  AlkPhos  213<H>  11-15      PT/INR - ( 14 Nov 2017 11:49 )   PT: 12.5 sec;   INR: 1.15 ratio         PTT - ( 15 Nov 2017 08:18 )  PTT:86.1 sec				    MEDICATIONS  (STANDING):  amLODIPine   Tablet 10 milliGRAM(s) Oral daily  calcium carbonate  625 mG + Vitamin D (OsCal 250 + D) 1 Tablet(s) Oral two times a day  famotidine    Tablet 20 milliGRAM(s) Oral daily  heparin  Infusion.  Unit(s)/Hr IV Continuous <Continuous>  methimazole 10 milliGRAM(s) Oral daily  metoprolol     tartrate 25 milliGRAM(s) Oral two times a day  simvastatin 10 milliGRAM(s) Oral at bedtime          DVT Prophylaxis:  LMWH                   (  )  Heparin SQ           (  )  Coumadin             (  )  Xarelto                  (  )  Eliquis                   (  )  Venodynes           (  )  Ambulation          (  )  UFH                       (  )  Contraindicated  (  ) HPI:  67 y/o F PMHx significant for Breast CA s/p B/L lumpectomies w/ LN dissection, CKD3, DM type2, diastolic CHF (HFpEF), hx of hyperthyroidism, gastric carcinoma on chemo (last tx was 2 wks ago; was due today) who was referred to the ED by her Endocrinologist for further evaluation and management of an acute Left IJ thrombus found on an outpatient "ultrasound." Repeat imaging in the ED confirmed an occlusive thrombus in the left internal jugular vein. In the ED the patient was started on a Heparin gtt per protocol. (14 Nov 2017 17:56)      Patient is a 66y old  Female who presents with a chief complaint of Abnormal test result. (14 Nov 2017 17:56)      Consulted by Dr. Win  for VTE prophylaxis, risk stratification, and anticoagulation management.    PAST MEDICAL & SURGICAL HISTORY:  Gastric carcinoma  CKD (chronic kidney disease) stage 3, GFR 30-59 ml/min  Diabetes  Hyperthyroidism  Diastolic CHF, chronic  Pleural effusion  Breast CA  History of thoracentesis: 3/2017  C2 cervical fracture: s/p Fusion  History of lymph node dissection of both axillae  S/P cholecystectomy  H/O: hysterectomy    doppler:    IMPRESSION: Occlusive thrombus in the LEFT internal jugular vein.    Chest Xray:   Portable AP radiograph of the chest is performed. comparison is made to   September 19 2017.    Left-sided infusion port is again seen with the tip overlying the   superior vena cava. Right axillary clips are again noted. The heart is   not enlarged. The trachea is midline. There is persistent small right   pleural effusion. There are bibasilar patchy densities as well.    Impression: Left infusion port is again seen.  Small right pleural effusion is again noted.  Bibasilar densities may represent infiltrates and/or pleural fluid        CrCl:59      IMPROVE VTE Risk Score: #6      IMPROVE Bleeding Risk Score #1.5    Falls Risk:   High (  )  Mod (x  )  Low (  )      FAMILY HISTORY:  No pertinent family history in first degree relatives    Denies any personal or familial history of clotting or bleeding disorders.    Allergies    latex (Unknown)  No Known Drug Allergies    Intolerances        REVIEW OF SYSTEMS    (  )Fever	     (  )Constipation	(  )SOB				(  )Headache	(  )Dysuria  (  )Chills	     (  )Melena	(  )Dyspnea present on exertion	                    (  )Dizziness                    (  )Polyuria  (  )Nausea	     (  )Hematochezia	(  )Cough			                    (  )Syncope   	(  )Hematuria  (  )Vomiting    (  )Chest Pain	(  )Wheezing			(  )Weakness  (  )Diarrhea     (  )Palpitations	(  )Anorexia			(  )Myalgia    All other review of systems negative: Yes          PHYSICAL EXAM:    Constitutional: Appears Well    Neurological: A& O x 3    Skin: Warm    Respiratory and Thorax: normal effort; Breath sounds: normal; No rales/wheezing/rhonchi  	  Cardiovascular: S1, S2, regular, NMBR	    Gastrointestinal: BS + x 4Q, nontender	    Genitourinary:  Bladder nondistended, nontender    Musculoskeletal:   General Right:   no muscle/joint tenderness,   normal tone, no joint swelling,   ROM: full	    General Left   no muscle/joint tenderness,   normal tone, no joint swelling,   ROM: full    Lower extrems:   Right: no calf tenderness              negative trav's sign               + pedal pulses    Left:   no calf tenderness              negative trav's sign               + pedal pulses      Left UE:  + swelling, + radial pulse, + cap refill                        9.4    10.4  )-----------( 126      ( 15 Nov 2017 05:37 )             30.4       11-15    139  |  107  |  12  ----------------------------<  84  3.6   |  27  |  1.05    Ca    8.2<L>      15 Nov 2017 05:37  Mg     1.8     11-15    TPro  5.0<L>  /  Alb  2.3<L>  /  TBili  0.6  /  DBili  x   /  AST  35  /  ALT  26  /  AlkPhos  213<H>  11-15      PT/INR - ( 14 Nov 2017 11:49 )   PT: 12.5 sec;   INR: 1.15 ratio         PTT - ( 15 Nov 2017 08:18 )  PTT:86.1 sec				    MEDICATIONS  (STANDING):  amLODIPine   Tablet 10 milliGRAM(s) Oral daily  calcium carbonate  625 mG + Vitamin D (OsCal 250 + D) 1 Tablet(s) Oral two times a day  enoxaparin Injectable 60 milliGRAM(s) SubCutaneous every 12 hours  famotidine    Tablet 20 milliGRAM(s) Oral daily  heparin  Infusion.  Unit(s)/Hr IV Continuous <Continuous>  methimazole 10 milliGRAM(s) Oral daily  metoprolol     tartrate 25 milliGRAM(s) Oral two times a day  simvastatin 10 milliGRAM(s) Oral at bedtime        DVT Prophylaxis:  LMWH                   ( X )  Heparin SQ           (  )  Coumadin             (  )  Xarelto                  (  )  Eliquis                   (  )  Venodynes           (  )  Ambulation          (  )  UFH                       (  )  Contraindicated  (  )

## 2017-11-15 NOTE — CONSULT NOTE ADULT - ASSESSMENT
A/P  67 yo female with h/o Breast Ca, now on Chemo for Gastric Ca, found to Have Left IJ Thrombosis A/P  65 yo female with h/o Breast Ca, now on Chemo for Gastric Ca, found to Have Left IJ Thrombosis    D/W pt and pt's daughter Lucy, options for treatment    both in agreement to use Lovenox 60 mg sq q 12h ( 1 mg/kg) as they prefer not to have freq bloodwork, dosage adjustment and diet restrictions  pt and dtr to be instructed on admin by RN  d/c hep gtt at 1600 and start Lovenox 60 mg sq q 12h at 1800, script sent to pt's pharmacy    above D/W Dr Navarrete agreeable to POC  Telephone call and message left with  to pt's Onc Dr Mercado [phone] A/P  65 yo female with h/o Breast Ca, now on Chemo for Gastric Ca, found to Have Left IJ Thrombosis    D/W pt and pt's daughter Lucy, options for treatment    both in agreement to use Lovenox 60 mg sq q 12h ( 1 mg/kg) as they prefer not to have freq bloodwork, dosage adjustment and diet restrictions  pt and dtr to be instructed on admin by RN  d/c hep gtt at 1600 and start Lovenox 60 mg sq q 12h at 1800, script sent to pt's pharmacy    above D/W Dr Navarrete agreeable to POC  Telephone call and message left with  to pt's Onc Dr Mercado [phone]    Addendum:  Telephone call from Dr Mercado and made aware of IJ thrombosis  Telephone call from Dtr Lucy, Lovenox will cost >600.00 and pt is unable to afford that, so discussed othe roptions with Dtr: Xarelto, but becomes just as expensive,  best option right now is coumadin with lovenox bridge, so pt to start coumadin 5 mg po tonight and cont lovenox until INR > 2.0, pt to f/u with us in our out pt clinic here at

## 2017-11-16 ENCOUNTER — TRANSCRIPTION ENCOUNTER (OUTPATIENT)
Age: 67
End: 2017-11-16

## 2017-11-16 VITALS — WEIGHT: 139.11 LBS

## 2017-11-16 LAB
ANION GAP SERPL CALC-SCNC: 6 MMOL/L — SIGNIFICANT CHANGE UP (ref 5–17)
BUN SERPL-MCNC: 16 MG/DL — SIGNIFICANT CHANGE UP (ref 7–23)
CALCIUM SERPL-MCNC: 8 MG/DL — LOW (ref 8.5–10.1)
CHLORIDE SERPL-SCNC: 109 MMOL/L — HIGH (ref 96–108)
CO2 SERPL-SCNC: 28 MMOL/L — SIGNIFICANT CHANGE UP (ref 22–31)
CREAT SERPL-MCNC: 1.2 MG/DL — SIGNIFICANT CHANGE UP (ref 0.5–1.3)
GLUCOSE SERPL-MCNC: 90 MG/DL — SIGNIFICANT CHANGE UP (ref 70–99)
HCT VFR BLD CALC: 27.1 % — LOW (ref 34.5–45)
HGB BLD-MCNC: 9.1 G/DL — LOW (ref 11.5–15.5)
INR BLD: 1.16 RATIO — SIGNIFICANT CHANGE UP (ref 0.88–1.16)
MCHC RBC-ENTMCNC: 28.2 PG — SIGNIFICANT CHANGE UP (ref 27–34)
MCHC RBC-ENTMCNC: 33.7 GM/DL — SIGNIFICANT CHANGE UP (ref 32–36)
MCV RBC AUTO: 83.9 FL — SIGNIFICANT CHANGE UP (ref 80–100)
PLATELET # BLD AUTO: 117 K/UL — LOW (ref 150–400)
POTASSIUM SERPL-MCNC: 3.7 MMOL/L — SIGNIFICANT CHANGE UP (ref 3.5–5.3)
POTASSIUM SERPL-SCNC: 3.7 MMOL/L — SIGNIFICANT CHANGE UP (ref 3.5–5.3)
PROTHROM AB SERPL-ACNC: 12.6 SEC — SIGNIFICANT CHANGE UP (ref 9.8–12.7)
RBC # BLD: 3.23 M/UL — LOW (ref 3.8–5.2)
RBC # FLD: 25.6 % — HIGH (ref 10.3–14.5)
SODIUM SERPL-SCNC: 143 MMOL/L — SIGNIFICANT CHANGE UP (ref 135–145)
WBC # BLD: 8.1 K/UL — SIGNIFICANT CHANGE UP (ref 3.8–10.5)
WBC # FLD AUTO: 8.1 K/UL — SIGNIFICANT CHANGE UP (ref 3.8–10.5)

## 2017-11-16 PROCEDURE — 99233 SBSQ HOSP IP/OBS HIGH 50: CPT

## 2017-11-16 RX ORDER — POTASSIUM CHLORIDE 20 MEQ
1 PACKET (EA) ORAL
Qty: 0 | Refills: 0 | COMMUNITY

## 2017-11-16 RX ORDER — FUROSEMIDE 40 MG
1 TABLET ORAL
Qty: 0 | Refills: 0 | COMMUNITY

## 2017-11-16 RX ADMIN — Medication 25 MILLIGRAM(S): at 06:05

## 2017-11-16 RX ADMIN — AMLODIPINE BESYLATE 10 MILLIGRAM(S): 2.5 TABLET ORAL at 06:05

## 2017-11-16 RX ADMIN — FAMOTIDINE 20 MILLIGRAM(S): 10 INJECTION INTRAVENOUS at 12:12

## 2017-11-16 RX ADMIN — ENOXAPARIN SODIUM 60 MILLIGRAM(S): 100 INJECTION SUBCUTANEOUS at 06:06

## 2017-11-16 RX ADMIN — Medication 1 TABLET(S): at 06:05

## 2017-11-16 NOTE — PROGRESS NOTE ADULT - SUBJECTIVE AND OBJECTIVE BOX
HPI:  65 y/o F PMHx significant for Breast CA s/p B/L lumpectomies w/ LN dissection, CKD3, DM type2, diastolic CHF (HFpEF), hx of hyperthyroidism, gastric carcinoma on chemo (last tx was 2 wks ago; was due today) who was referred to the ED by her Endocrinologist for further evaluation and management of an acute Left IJ thrombus found on an outpatient "ultrasound." Repeat imaging in the ED confirmed an occlusive thrombus in the left internal jugular vein. In the ED the patient was started on a Heparin gtt per protocol. (14 Nov 2017 17:56)      Patient is a 66y old  Female who presents with a chief complaint of Abnormal test result. (14 Nov 2017 17:56)      Consulted by Dr. Win  for VTE prophylaxis, risk stratification, and anticoagulation management.    PAST MEDICAL & SURGICAL HISTORY:  Gastric carcinoma  CKD (chronic kidney disease) stage 3, GFR 30-59 ml/min  Diabetes  Hyperthyroidism  Diastolic CHF, chronic  Pleural effusion  Breast CA  History of thoracentesis: 3/2017  C2 cervical fracture: s/p Fusion  History of lymph node dissection of both axillae  S/P cholecystectomy  H/O: hysterectomy    doppler:    IMPRESSION: Occlusive thrombus in the LEFT internal jugular vein.    Chest Xray:   Portable AP radiograph of the chest is performed. comparison is made to   September 19 2017.    Left-sided infusion port is again seen with the tip overlying the   superior vena cava. Right axillary clips are again noted. The heart is   not enlarged. The trachea is midline. There is persistent small right   pleural effusion. There are bibasilar patchy densities as well.    Impression: Left infusion port is again seen.  Small right pleural effusion is again noted.  Bibasilar densities may represent infiltrates and/or pleural fluid        CrCl:59      IMPROVE VTE Risk Score: #6      IMPROVE Bleeding Risk Score #1.5    Falls Risk:   High (  )  Mod (x  )  Low (  )      FAMILY HISTORY:  No pertinent family history in first degree relatives    Denies any personal or familial history of clotting or bleeding disorders.    Allergies    latex (Unknown)  No Known Drug Allergies    Intolerances        REVIEW OF SYSTEMS    (  )Fever	     (  )Constipation	(  )SOB				(  )Headache	(  )Dysuria  (  )Chills	     (  )Melena	(  )Dyspnea present on exertion	                    (  )Dizziness                    (  )Polyuria  (  )Nausea	     (  )Hematochezia	(  )Cough			                    (  )Syncope   	(  )Hematuria  (  )Vomiting    (  )Chest Pain	(  )Wheezing			(  )Weakness  (  )Diarrhea     (  )Palpitations	(  )Anorexia			(  )Myalgia    All other review of systems negative: Yes          PHYSICAL EXAM:    Constitutional: Appears Well    Neurological: A& O x 3    Skin: Warm    Respiratory and Thorax: normal effort; Breath sounds: normal; No rales/wheezing/rhonchi  	  Cardiovascular: S1, S2, regular, NMBR	    Gastrointestinal: BS + x 4Q, nontender	    Genitourinary:  Bladder nondistended, nontender    Musculoskeletal:   General Right:   no muscle/joint tenderness,   normal tone, no joint swelling,   ROM: full	    General Left   no muscle/joint tenderness,   normal tone, no joint swelling,   ROM: full    Lower extrems:   Right: no calf tenderness              negative trav's sign               + pedal pulses    Left:   no calf tenderness              negative trav's sign               + pedal pulses      Left UE:  + swelling, + radial pulse, + cap refill                          9.1    8.1   )-----------( 117      ( 16 Nov 2017 05:25 )             27.1       11-16    143  |  109<H>  |  16  ----------------------------<  90  3.7   |  28  |  1.20    Ca    8.0<L>      16 Nov 2017 05:25  Mg     1.8     11-15    TPro  5.0<L>  /  Alb  2.3<L>  /  TBili  0.6  /  DBili  x   /  AST  35  /  ALT  26  /  AlkPhos  213<H>  11-15      PT/INR - ( 16 Nov 2017 05:25 )   PT: 12.6 sec;   INR: 1.16 ratio         PTT - ( 15 Nov 2017 19:40 )  PTT:37.1 sec                        9.4    10.4  )-----------( 126      ( 15 Nov 2017 05:37 )             30.4       11-15    139  |  107  |  12  ----------------------------<  84  3.6   |  27  |  1.05    Ca    8.2<L>      15 Nov 2017 05:37  Mg     1.8     11-15    TPro  5.0<L>  /  Alb  2.3<L>  /  TBili  0.6  /  DBili  x   /  AST  35  /  ALT  26  /  AlkPhos  213<H>  11-15      PT/INR - ( 14 Nov 2017 11:49 )   PT: 12.5 sec;   INR: 1.15 ratio         PTT - ( 15 Nov 2017 08:18 )  PTT:86.1 sec				    MEDICATIONS  (STANDING):  amLODIPine   Tablet 10 milliGRAM(s) Oral daily  calcium carbonate  625 mG + Vitamin D (OsCal 250 + D) 1 Tablet(s) Oral two times a day  enoxaparin Injectable 60 milliGRAM(s) SubCutaneous every 12 hours  famotidine    Tablet 20 milliGRAM(s) Oral daily  heparin  Infusion.  Unit(s)/Hr IV Continuous <Continuous>  methimazole 10 milliGRAM(s) Oral daily  metoprolol     tartrate 25 milliGRAM(s) Oral two times a day  simvastatin 10 milliGRAM(s) Oral at bedtime        DVT Prophylaxis:  LMWH                   ( X )  Heparin SQ           (  )  Coumadin             (  )  Xarelto                  (  )  Eliquis                   (  )  Venodynes           (  )  Ambulation          (  )  UFH                       (  )  Contraindicated  (  )

## 2017-11-16 NOTE — DISCHARGE NOTE ADULT - HOSPITAL COURSE
· Subjective and Objective: 	  Patient is a 66y old  Female who presents with a chief complaint of Abnormal test result. (14 Nov 2017 17:56)      HPI:  67 y/o F PMHx significant for Breast CA s/p B/L lumpectomies w/ LN dissection, CKD3, DM type2, diastolic CHF (HFpEF), hx of hyperthyroidism, gastric carcinoma on chemo (last tx was 2 wks ago; was due today) who was referred to the ED by her Endocrinologist for further evaluation and management of an acute Left IJ thrombus found on an outpatient "ultrasound." Repeat imaging in the ED confirmed an occlusive thrombus in the left internal jugular vein. In the ED the patient was started on a Heparin gtt per protocol. (14 Nov 2017 17:56)    11/15: No complaints, denies upper extremity pain or discomfort. On heparin gtt and tolerating.  11/16: Pt switched from heparin to therapeutic Lovenox and tolerating well.  She presented with thrombocytopenia, but seems stable on lovenox.  Her blood pressure is better controlled with the addition of lopressor. I spoke with her daughter who agreed to re-start lisinopril as outpatient but will need close monitoring of her kidney function.  Also her lasix was held and doing well off of it with improvement in her creatinine.  She will need outpatient follow up with her PCP and nephrologist for management of her lasix, and lisinopril and also to monitor her thrombocytopenia.  She will follow with anticoagulation services Saturday for management of her anticoagulation (lovenox and coumadin).  She is HD stable, and eager to go home.  Review of system- Rest of the review of system are normal except mentioned in HPI    Vital Signs Last 24 Hrs  T(C): 36.8 (16 Nov 2017 14:20), Max: 37 (16 Nov 2017 05:50)  T(F): 98.3 (16 Nov 2017 14:20), Max: 98.6 (16 Nov 2017 05:50)  HR: 77 (16 Nov 2017 14:20) (77 - 93)  BP: 136/66 (16 Nov 2017 14:20) (136/66 - 160/83)  RR: 18 (16 Nov 2017 14:20) (18 - 18)  SpO2: 97% (16 Nov 2017 14:20) (96% - 99%)    PHYSICAL EXAM:    Constitutional: NAD, awake and alert, well-developed  HEENT: PERR, EOMI, Normal Hearing, MMM  Neck: Soft and supple  Respiratory: Breath sounds are clear bilaterally, No wheezing, rales or rhonchi  Cardiovascular: S1 and S2, regular rate and rhythm, no Murmurs, gallops or rubs  Gastrointestinal: Bowel Sounds present, soft, nontender, nondistended, no guarding, no rebound  Extremities: No peripheral edema  Neurological: A/O x 3, no focal deficits  Skin: No rashes    meds/labs: Reviewed.    Assessment and Plan:  67 y/o F PMHx significant for Breast CA s/p B/L lumpectomies w/ LN dissection, CKD3, DM type2, diastolic CHF (HFpEF), hx of hyperthyroidism, gastric carcinoma on chemo (last tx was 2 wks ago; was due today) who was referred to the ED by her Endocrinologist for further evaluation and management of an acute Left IJ thrombus found on an outpatient "ultrasound."    Internal jugular vein thrombosis: Asymptomatic:  -Repeat imaging in the ED confirmed an occlusive thrombus in the left internal jugular vein.   -change heparin gtt to lovenox and coumadin per anticoagulation services    Gastric carcinoma.    -cont. pain management prn.   -outpt oncology f/u.    Essential hypertension.    -cont. Amlodipine 10mg po daily  -was taken off Lisinopril 10mg po daily, added lopressor here with good response however since family has lisinopril told to restart this and closely follow up with pcp and/or nephrologist to make sure her kidney function remains stable.    thrombocytopenia:  -monitor, stable on lovenox.    hypoalbuminemia: Likely secondary to chronic disease state.  -encourage po intake, increase protein intake.    Hyperthyroidism.    -cont. Methimazole.      Hyperlipemia.    -cont. statin therapy w/ Simvastatin 10mg po qHS.     Diastolic CHF, chronic - compensated:  -takes Furosemide 20mg po daily w/ K supplementation -> Continue to hold until you see your outpatient provider.    CKD (chronic kidney disease) stage 3, GFR 30-59 ml/min.    -Stable/improved from last admisison.    Attending Statement:  40 minutes spent on total encounter; more than 50% of the visit was spent counseling and/or coordinating care by the attending physician.

## 2017-11-16 NOTE — PROGRESS NOTE ADULT - ASSESSMENT
A/P  65 yo female with h/o Breast Ca, now on Chemo for Gastric Ca, found to Have Left IJ Thrombosis    D/W pt and pt's daughter Lucy:    continue on coumadin 5 mg po daily, cont lovenox 60 mg sq q 12h, DTR independent in injections technique, scripts for both sent over to pt's pharmacy and will come to the clinic on saturday at 11:45 for INR recheck,  coumadin dosage sheet provided and explained to both pt and daughter

## 2017-11-16 NOTE — DISCHARGE NOTE ADULT - PLAN OF CARE
lovenox and coumadin therapy. Follow up with anticoagulation services Saturday for blood work and management of your blood clot. BP control. Restart lisinopril and follow up with your PCP and or nephrologist to make sure your kidney function remains stable on this medication.  I would hold off on lasix until you are followed up as well to avoid dehydration.

## 2017-11-16 NOTE — DISCHARGE NOTE ADULT - MEDICATION SUMMARY - MEDICATIONS TO TAKE
I will START or STAY ON the medications listed below when I get home from the hospital:    oxaliplatin  -- Indication: For resume home meds    oxyCODONE 5 mg oral tablet  -- 1 tab(s) by mouth every 4 hours, As Needed for pain mdd: 4 tab  -- Indication: For resume home meds    enoxaparin 60 mg/0.6 mL injectable solution  -- 60 milligram(s) injectable every 12 hours   -- It is very important that you take or use this exactly as directed.  Do not skip doses or discontinue unless directed by your doctor.    -- Indication: For Thrombus    Coumadin 2.5 mg oral tablet  -- 1 tab(s) by mouth once a day (in the evening)   -- Do not take this drug if you are pregnant.  It is very important that you take or use this exactly as directed.  Do not skip doses or discontinue unless directed by your doctor.  Obtain medical advice before taking any non-prescription drugs as some may affect the action of this medication.    -- Indication: For Thrombus    ondansetron 4 mg oral tablet  -- 1 tab(s) by mouth every 6 hours, As Needed for nausea  -- Indication: For resume home meds    prochlorperazine 10 mg oral tablet  -- 1 tab(s) by mouth 4 times a day, As Needed for nausea  -- Indication: For resume home meds    simvastatin 10 mg oral tablet  -- 1 tab(s) by mouth once a day (at bedtime)  -- Indication: For resume home meds    fluorouracil  -- Indication: For resume home meds    methIMAzole 10 mg oral tablet  -- 1 tab(s) by mouth once a day  -- Indication: For resume home meds    amLODIPine 10 mg oral tablet  -- 1 tab(s) by mouth once a day  -- Indication: For resume home meds    raNITIdine 150 mg oral tablet  -- 1 tab(s) by mouth once a day  -- Indication: For resume home meds    senna oral tablet  -- 1 tab(s) by mouth 2 times a day, As Needed  -- Indication: For resume home meds    docusate sodium 100 mg oral capsule  -- 1 cap(s) by mouth 2 times a day, As Needed for constipation  -- Indication: For resume home meds    Vol-[employer] Rx oral tablet  -- 1 tab(s) by mouth once a day  -- Indication: For resume home meds    Calcium 600+D oral tablet  -- 1 tab(s) by mouth 2 times a day  -- Indication: For resume home meds

## 2017-11-16 NOTE — DISCHARGE NOTE ADULT - CARE PROVIDER_API CALL
anticoagulation services,   Phone: (   )    -  Fax: (   )    -    Kristi Sue), Medicine  [address] [place] [address]  Phone: [phone]  Fax: [phone]

## 2017-11-16 NOTE — DISCHARGE NOTE ADULT - CARE PLAN
Principal Discharge DX:	Internal jugular vein thrombosis, left  Goal:	lovenox and coumadin therapy.  Instructions for follow-up, activity and diet:	Follow up with anticoagulation services Saturday for blood work and management of your blood clot.  Secondary Diagnosis:	Essential hypertension  Goal:	BP control.  Instructions for follow-up, activity and diet:	Restart lisinopril and follow up with your PCP and or nephrologist to make sure your kidney function remains stable on this medication.  I would hold off on lasix until you are followed up as well to avoid dehydration.

## 2017-11-16 NOTE — CONSULT NOTE ADULT - SUBJECTIVE AND OBJECTIVE BOX
Consult Note:   · Provider Specialty	Vascular	    Referral/Consultation:   Initial Consult:  · Requested by Name:	Dr. Win	  · Date/Time:	15-Nov-2017 10:08	  · Reason for Referral/Consultation:	Anticoagulation Management of Left IJ thrombus	      · Subjective and Objective: 	    HPI:  67 y/o F PMHx significant for Breast CA s/p B/L lumpectomies w/ LN dissection, CKD3, DM type2, diastolic CHF (HFpEF), hx of hyperthyroidism, gastric carcinoma on chemo (last tx was 2 wks ago; was due today) who was referred to the ED by her Endocrinologist for further evaluation and management of an acute Left IJ thrombus found on an outpatient "ultrasound." Repeat imaging in the ED confirmed an occlusive thrombus in the left internal jugular vein. In the ED the patient was started on a Heparin gtt per protocol. (14 Nov 2017 17:56)  She denies prior history of DVT or supf thrombophebitis; she denies L cervical discomfort; she denies SOB, dyspnea, or pleuritic chest pain      Patient is a 66y old  Female who presents with a chief complaint of Abnormal test result. (14 Nov 2017 17:56)      Consulted by Dr. Win  for VTE prophylaxis, risk stratification, and anticoagulation management.    PAST MEDICAL & SURGICAL HISTORY:  Gastric carcinoma  CKD (chronic kidney disease) stage 3, GFR 30-59 ml/min  Diabetes  Hyperthyroidism  Diastolic CHF, chronic  Pleural effusion  Breast CA  History of thoracentesis: 3/2017  C2 cervical fracture: s/p Fusion  History of lymph node dissection of both axillae  S/P cholecystectomy  H/O: hysterectomy    doppler:    IMPRESSION: Occlusive thrombus in the LEFT internal jugular vein.    Chest Xray:   Portable AP radiograph of the chest is performed. comparison is made to   September 19 2017.    Left-sided infusion port is again seen with the tip overlying the   superior vena cava. Right axillary clips are again noted. The heart is   not enlarged. The trachea is midline. There is persistent small right   pleural effusion. There are bibasilar patchy densities as well.    Impression: Left infusion port is again seen.  Small right pleural effusion is again noted.  Bibasilar densities may represent infiltrates and/or pleural fluid        CrCl:59      IMPROVE VTE Risk Score: #6      IMPROVE Bleeding Risk Score #1.5    Falls Risk:   High (  )  Mod (x  )  Low (  )      FAMILY HISTORY:  No pertinent family history in first degree relatives    Denies any personal or familial history of clotting or bleeding disorders.    Allergies    latex (Unknown)  No Known Drug Allergies    Intolerances        REVIEW OF SYSTEMS    (  )Fever	     (  )Constipation	(  )SOB				(  )Headache	(  )Dysuria  (  )Chills	     (  )Melena	(  )Dyspnea present on exertion	                    (  )Dizziness                    (  )Polyuria  (  )Nausea	     (  )Hematochezia	(  )Cough			                    (  )Syncope   	(  )Hematuria  (  )Vomiting    (  )Chest Pain	(  )Wheezing			(  )Weakness  (  )Diarrhea     (  )Palpitations	(  )Anorexia			(  )Myalgia    All other review of systems negative: Yes          PHYSICAL EXAM:    Constitutional: Appears Well    Neurological: A& O x 3    Skin: Warm  L cervical area with port catheter in place for IJ; no cervical tenderness  Respiratory and Thorax: normal effort; Breath sounds: normal; No rales/wheezing/rhonchi  	  Cardiovascular: S1, S2, regular, NMBR	    Gastrointestinal: BS + x 4Q, nontender	    Genitourinary:  Bladder nondistended, nontender    Musculoskeletal:   General Right:   no muscle/joint tenderness,   normal tone, no joint swelling,   ROM: full	    General Left   no muscle/joint tenderness,   normal tone, no joint swelling,   ROM: full    Lower extrems:   Right and Left: no calf tenderness              negative trav's sign               2+ DP pulsese bilaterally and trace -1+ edema bilaterally          Left UE:  + swelling, + radial pulse, + cap refill                        9.4    10.4  )-----------( 126      ( 15 Nov 2017 05:37 )             30.4       11-15    139  |  107  |  12  ----------------------------<  84  3.6   |  27  |  1.05    Ca    8.2<L>      15 Nov 2017 05:37  Mg     1.8     11-15    TPro  5.0<L>  /  Alb  2.3<L>  /  TBili  0.6  /  DBili  x   /  AST  35  /  ALT  26  /  AlkPhos  213<H>  11-15      PT/INR - ( 14 Nov 2017 11:49 )   PT: 12.5 sec;   INR: 1.15 ratio         PTT - ( 15 Nov 2017 08:18 )  PTT:86.1 sec				    MEDICATIONS  (STANDING):  amLODIPine   Tablet 10 milliGRAM(s) Oral daily  calcium carbonate  625 mG + Vitamin D (OsCal 250 + D) 1 Tablet(s) Oral two times a day  enoxaparin Injectable 60 milliGRAM(s) SubCutaneous every 12 hours  famotidine    Tablet 20 milliGRAM(s) Oral daily  heparin  Infusion.  Unit(s)/Hr IV Continuous <Continuous>  methimazole 10 milliGRAM(s) Oral daily  metoprolol     tartrate 25 milliGRAM(s) Oral two times a day  simvastatin 10 milliGRAM(s) Oral at bedtime        DVT Prophylaxis:  LMWH                   ( X )  Heparin SQ           (  )  Coumadin             (  )  Xarelto                  (  )  Eliquis                   (  )  Venodynes           (  )  Ambulation          (  )  UFH                       (  )  Contraindicated  (  )              Assessment and Recommendation:   · Assessment		  A/P  67 yo female with h/o Breast Ca, now on Chemo for Gastric Ca, found to Have Left IJ Thrombosis, probably precipitated by the port catheter and her hypercoagulable state from her malignancy; coumadin may not be best anti coagulant in this situation    D/W pt and pt's daughter Lucy, options for treatment    both in agreement to use Lovenox 60 mg sq q 12h ( 1 mg/kg) as they prefer not to have freq bloodwork, dosage adjustment and diet restrictions  pt and dtr to be instructed on admin by RN  d/c hep gtt at 1600 and start Lovenox 60 mg sq q 12h at 1800, script sent to pt's pharmacy

## 2017-11-16 NOTE — DISCHARGE NOTE ADULT - PATIENT PORTAL LINK FT
“You can access the Follow[employer] Patient Portal, offered by [facility], by registering with the following website: [email or web address]”

## 2017-11-17 DIAGNOSIS — E03.9 HYPOTHYROIDISM, UNSPECIFIED: ICD-10-CM

## 2017-11-17 DIAGNOSIS — E11.22 TYPE 2 DIABETES MELLITUS WITH DIABETIC CHRONIC KIDNEY DISEASE: ICD-10-CM

## 2017-11-17 DIAGNOSIS — Z85.3 PERSONAL HISTORY OF MALIGNANT NEOPLASM OF BREAST: ICD-10-CM

## 2017-11-17 DIAGNOSIS — N18.3 CHRONIC KIDNEY DISEASE, STAGE 3 (MODERATE): ICD-10-CM

## 2017-11-17 DIAGNOSIS — D69.6 THROMBOCYTOPENIA, UNSPECIFIED: ICD-10-CM

## 2017-11-17 DIAGNOSIS — I82.C12 ACUTE EMBOLISM AND THROMBOSIS OF LEFT INTERNAL JUGULAR VEIN: ICD-10-CM

## 2017-11-17 DIAGNOSIS — C16.9 MALIGNANT NEOPLASM OF STOMACH, UNSPECIFIED: ICD-10-CM

## 2017-11-17 DIAGNOSIS — I50.32 CHRONIC DIASTOLIC (CONGESTIVE) HEART FAILURE: ICD-10-CM

## 2017-11-17 DIAGNOSIS — E88.09 OTHER DISORDERS OF PLASMA-PROTEIN METABOLISM, NOT ELSEWHERE CLASSIFIED: ICD-10-CM

## 2017-11-17 DIAGNOSIS — I13.0 HYPERTENSIVE HEART AND CHRONIC KIDNEY DISEASE WITH HEART FAILURE AND STAGE 1 THROUGH STAGE 4 CHRONIC KIDNEY DISEASE, OR UNSPECIFIED CHRONIC KIDNEY DISEASE: ICD-10-CM

## 2017-11-18 ENCOUNTER — APPOINTMENT (OUTPATIENT)
Dept: CARDIOLOGY | Facility: CLINIC | Age: 67
End: 2017-11-18
Payer: SELF-PAY

## 2017-11-18 PROCEDURE — 85610 PROTHROMBIN TIME: CPT | Mod: QW

## 2017-11-18 PROCEDURE — 36416 COLLJ CAPILLARY BLOOD SPEC: CPT

## 2017-11-20 ENCOUNTER — APPOINTMENT (OUTPATIENT)
Dept: CARDIOLOGY | Facility: CLINIC | Age: 67
End: 2017-11-20
Payer: SELF-PAY

## 2017-11-20 PROCEDURE — 36416 COLLJ CAPILLARY BLOOD SPEC: CPT

## 2017-11-20 PROCEDURE — 85610 PROTHROMBIN TIME: CPT | Mod: QW

## 2017-11-20 PROCEDURE — 99213 OFFICE O/P EST LOW 20 MIN: CPT

## 2017-11-22 ENCOUNTER — APPOINTMENT (OUTPATIENT)
Dept: CARDIOLOGY | Facility: CLINIC | Age: 67
End: 2017-11-22
Payer: SELF-PAY

## 2017-11-22 PROCEDURE — 99213 OFFICE O/P EST LOW 20 MIN: CPT

## 2017-11-22 PROCEDURE — 36416 COLLJ CAPILLARY BLOOD SPEC: CPT

## 2017-11-22 PROCEDURE — 85610 PROTHROMBIN TIME: CPT | Mod: QW

## 2017-11-25 ENCOUNTER — APPOINTMENT (OUTPATIENT)
Dept: CARDIOLOGY | Facility: CLINIC | Age: 67
End: 2017-11-25
Payer: SELF-PAY

## 2017-11-25 PROCEDURE — 36416 COLLJ CAPILLARY BLOOD SPEC: CPT

## 2017-11-25 PROCEDURE — 99213 OFFICE O/P EST LOW 20 MIN: CPT

## 2017-11-25 PROCEDURE — 85610 PROTHROMBIN TIME: CPT | Mod: QW

## 2017-11-27 ENCOUNTER — APPOINTMENT (OUTPATIENT)
Dept: CARDIOLOGY | Facility: CLINIC | Age: 67
End: 2017-11-27
Payer: SELF-PAY

## 2017-11-27 PROCEDURE — 99213 OFFICE O/P EST LOW 20 MIN: CPT

## 2017-11-27 PROCEDURE — 36416 COLLJ CAPILLARY BLOOD SPEC: CPT

## 2017-11-27 PROCEDURE — 85610 PROTHROMBIN TIME: CPT | Mod: QW

## 2017-11-30 ENCOUNTER — RX RENEWAL (OUTPATIENT)
Age: 67
End: 2017-11-30

## 2017-12-01 ENCOUNTER — APPOINTMENT (OUTPATIENT)
Dept: CARDIOLOGY | Facility: CLINIC | Age: 67
End: 2017-12-01
Payer: SELF-PAY

## 2017-12-01 PROCEDURE — 36416 COLLJ CAPILLARY BLOOD SPEC: CPT

## 2017-12-01 PROCEDURE — 99213 OFFICE O/P EST LOW 20 MIN: CPT

## 2017-12-01 PROCEDURE — 85610 PROTHROMBIN TIME: CPT | Mod: QW

## 2017-12-06 ENCOUNTER — APPOINTMENT (OUTPATIENT)
Dept: CARDIOLOGY | Facility: CLINIC | Age: 67
End: 2017-12-06
Payer: SELF-PAY

## 2017-12-06 PROCEDURE — 36416 COLLJ CAPILLARY BLOOD SPEC: CPT

## 2017-12-06 PROCEDURE — 85610 PROTHROMBIN TIME: CPT | Mod: QW

## 2017-12-06 PROCEDURE — 99213 OFFICE O/P EST LOW 20 MIN: CPT

## 2017-12-11 ENCOUNTER — APPOINTMENT (OUTPATIENT)
Dept: CARDIOLOGY | Facility: CLINIC | Age: 67
End: 2017-12-11
Payer: SELF-PAY

## 2017-12-11 PROCEDURE — 36416 COLLJ CAPILLARY BLOOD SPEC: CPT

## 2017-12-11 PROCEDURE — 85610 PROTHROMBIN TIME: CPT | Mod: QW

## 2017-12-11 PROCEDURE — 99213 OFFICE O/P EST LOW 20 MIN: CPT

## 2017-12-13 ENCOUNTER — APPOINTMENT (OUTPATIENT)
Dept: CARDIOLOGY | Facility: CLINIC | Age: 67
End: 2017-12-13
Payer: SELF-PAY

## 2017-12-13 PROCEDURE — 99213 OFFICE O/P EST LOW 20 MIN: CPT

## 2017-12-13 PROCEDURE — 36416 COLLJ CAPILLARY BLOOD SPEC: CPT

## 2017-12-13 PROCEDURE — 85610 PROTHROMBIN TIME: CPT | Mod: QW

## 2017-12-18 ENCOUNTER — APPOINTMENT (OUTPATIENT)
Dept: CARDIOLOGY | Facility: CLINIC | Age: 67
End: 2017-12-18
Payer: SELF-PAY

## 2017-12-18 PROCEDURE — 99213 OFFICE O/P EST LOW 20 MIN: CPT

## 2017-12-18 PROCEDURE — 36416 COLLJ CAPILLARY BLOOD SPEC: CPT

## 2017-12-18 PROCEDURE — 85610 PROTHROMBIN TIME: CPT | Mod: QW

## 2017-12-20 VITALS — SYSTOLIC BLOOD PRESSURE: 142 MMHG | WEIGHT: 140 LBS | DIASTOLIC BLOOD PRESSURE: 84 MMHG

## 2017-12-22 ENCOUNTER — APPOINTMENT (OUTPATIENT)
Dept: CARDIOLOGY | Facility: CLINIC | Age: 67
End: 2017-12-22
Payer: SELF-PAY

## 2017-12-22 PROCEDURE — 99213 OFFICE O/P EST LOW 20 MIN: CPT

## 2017-12-22 PROCEDURE — 85610 PROTHROMBIN TIME: CPT | Mod: QW

## 2017-12-22 PROCEDURE — 36416 COLLJ CAPILLARY BLOOD SPEC: CPT

## 2017-12-27 ENCOUNTER — APPOINTMENT (OUTPATIENT)
Dept: CARDIOLOGY | Facility: CLINIC | Age: 67
End: 2017-12-27
Payer: SELF-PAY

## 2017-12-27 PROCEDURE — 36416 COLLJ CAPILLARY BLOOD SPEC: CPT

## 2017-12-27 PROCEDURE — 99213 OFFICE O/P EST LOW 20 MIN: CPT

## 2017-12-27 PROCEDURE — 85610 PROTHROMBIN TIME: CPT | Mod: QW

## 2017-12-30 ENCOUNTER — APPOINTMENT (OUTPATIENT)
Dept: CARDIOLOGY | Facility: CLINIC | Age: 67
End: 2017-12-30
Payer: SELF-PAY

## 2017-12-30 PROCEDURE — 99213 OFFICE O/P EST LOW 20 MIN: CPT

## 2017-12-30 PROCEDURE — 36416 COLLJ CAPILLARY BLOOD SPEC: CPT

## 2017-12-30 PROCEDURE — 85610 PROTHROMBIN TIME: CPT | Mod: QW

## 2018-01-03 ENCOUNTER — APPOINTMENT (OUTPATIENT)
Dept: CARDIOLOGY | Facility: CLINIC | Age: 68
End: 2018-01-03
Payer: SELF-PAY

## 2018-01-03 PROCEDURE — 85610 PROTHROMBIN TIME: CPT | Mod: QW

## 2018-01-03 PROCEDURE — 99213 OFFICE O/P EST LOW 20 MIN: CPT

## 2018-01-03 PROCEDURE — 36416 COLLJ CAPILLARY BLOOD SPEC: CPT

## 2018-01-10 ENCOUNTER — APPOINTMENT (OUTPATIENT)
Dept: CARDIOLOGY | Facility: CLINIC | Age: 68
End: 2018-01-10
Payer: SELF-PAY

## 2018-01-10 PROCEDURE — 85610 PROTHROMBIN TIME: CPT | Mod: QW

## 2018-01-10 PROCEDURE — 36416 COLLJ CAPILLARY BLOOD SPEC: CPT

## 2018-01-10 PROCEDURE — 99213 OFFICE O/P EST LOW 20 MIN: CPT

## 2018-01-17 ENCOUNTER — APPOINTMENT (OUTPATIENT)
Dept: CARDIOLOGY | Facility: CLINIC | Age: 68
End: 2018-01-17
Payer: SELF-PAY

## 2018-01-17 PROCEDURE — 36416 COLLJ CAPILLARY BLOOD SPEC: CPT

## 2018-01-17 PROCEDURE — 85610 PROTHROMBIN TIME: CPT | Mod: QW

## 2018-01-17 PROCEDURE — 99213 OFFICE O/P EST LOW 20 MIN: CPT

## 2018-01-24 ENCOUNTER — APPOINTMENT (OUTPATIENT)
Dept: CARDIOLOGY | Facility: CLINIC | Age: 68
End: 2018-01-24
Payer: MEDICARE

## 2018-01-24 PROCEDURE — 36416 COLLJ CAPILLARY BLOOD SPEC: CPT

## 2018-01-24 PROCEDURE — 85610 PROTHROMBIN TIME: CPT | Mod: QW

## 2018-01-24 PROCEDURE — 99213 OFFICE O/P EST LOW 20 MIN: CPT

## 2018-01-31 ENCOUNTER — APPOINTMENT (OUTPATIENT)
Dept: CARDIOLOGY | Facility: CLINIC | Age: 68
End: 2018-01-31
Payer: MEDICARE

## 2018-01-31 PROCEDURE — 36416 COLLJ CAPILLARY BLOOD SPEC: CPT

## 2018-01-31 PROCEDURE — 99213 OFFICE O/P EST LOW 20 MIN: CPT

## 2018-01-31 PROCEDURE — 85610 PROTHROMBIN TIME: CPT | Mod: QW

## 2018-01-31 RX ORDER — WARFARIN 7.5 MG/1
7.5 TABLET ORAL
Qty: 30 | Refills: 3 | Status: ACTIVE | COMMUNITY
Start: 2017-11-30 | End: 1900-01-01

## 2018-02-09 ENCOUNTER — APPOINTMENT (OUTPATIENT)
Dept: CARDIOLOGY | Facility: CLINIC | Age: 68
End: 2018-02-09
Payer: MEDICARE

## 2018-02-09 PROCEDURE — 99213 OFFICE O/P EST LOW 20 MIN: CPT

## 2018-02-09 PROCEDURE — 36416 COLLJ CAPILLARY BLOOD SPEC: CPT

## 2018-02-09 PROCEDURE — 85610 PROTHROMBIN TIME: CPT | Mod: QW

## 2018-02-16 ENCOUNTER — APPOINTMENT (OUTPATIENT)
Dept: CARDIOLOGY | Facility: CLINIC | Age: 68
End: 2018-02-16
Payer: MEDICARE

## 2018-02-16 PROCEDURE — 36416 COLLJ CAPILLARY BLOOD SPEC: CPT

## 2018-02-16 PROCEDURE — 85610 PROTHROMBIN TIME: CPT | Mod: QW

## 2018-02-16 PROCEDURE — 99213 OFFICE O/P EST LOW 20 MIN: CPT

## 2018-02-23 ENCOUNTER — APPOINTMENT (OUTPATIENT)
Dept: CARDIOLOGY | Facility: CLINIC | Age: 68
End: 2018-02-23
Payer: MEDICARE

## 2018-02-23 DIAGNOSIS — C16.9 MALIGNANT NEOPLASM OF STOMACH, UNSPECIFIED: ICD-10-CM

## 2018-02-23 DIAGNOSIS — C50.919 MALIGNANT NEOPLASM OF UNSPECIFIED SITE OF UNSPECIFIED FEMALE BREAST: ICD-10-CM

## 2018-02-23 PROCEDURE — 36416 COLLJ CAPILLARY BLOOD SPEC: CPT

## 2018-02-23 PROCEDURE — 85610 PROTHROMBIN TIME: CPT | Mod: QW

## 2018-02-23 PROCEDURE — 99213 OFFICE O/P EST LOW 20 MIN: CPT

## 2018-03-09 ENCOUNTER — APPOINTMENT (OUTPATIENT)
Dept: CARDIOLOGY | Facility: CLINIC | Age: 68
End: 2018-03-09
Payer: SELF-PAY

## 2018-03-09 PROCEDURE — 99213 OFFICE O/P EST LOW 20 MIN: CPT

## 2018-03-09 PROCEDURE — 85610 PROTHROMBIN TIME: CPT | Mod: QW

## 2018-03-09 PROCEDURE — 36416 COLLJ CAPILLARY BLOOD SPEC: CPT

## 2018-03-14 ENCOUNTER — APPOINTMENT (OUTPATIENT)
Dept: CARDIOLOGY | Facility: CLINIC | Age: 68
End: 2018-03-14
Payer: SELF-PAY

## 2018-03-14 PROCEDURE — 85610 PROTHROMBIN TIME: CPT | Mod: QW

## 2018-03-14 PROCEDURE — 99213 OFFICE O/P EST LOW 20 MIN: CPT

## 2018-03-14 PROCEDURE — 36416 COLLJ CAPILLARY BLOOD SPEC: CPT

## 2018-03-22 ENCOUNTER — APPOINTMENT (OUTPATIENT)
Dept: CARDIOLOGY | Facility: CLINIC | Age: 68
End: 2018-03-22
Payer: SELF-PAY

## 2018-03-22 PROCEDURE — 99213 OFFICE O/P EST LOW 20 MIN: CPT

## 2018-03-29 ENCOUNTER — APPOINTMENT (OUTPATIENT)
Dept: CARDIOLOGY | Facility: CLINIC | Age: 68
End: 2018-03-29
Payer: SELF-PAY

## 2018-03-29 PROCEDURE — 36416 COLLJ CAPILLARY BLOOD SPEC: CPT

## 2018-03-29 PROCEDURE — 99213 OFFICE O/P EST LOW 20 MIN: CPT

## 2018-03-29 PROCEDURE — 85610 PROTHROMBIN TIME: CPT | Mod: QW

## 2018-04-03 ENCOUNTER — APPOINTMENT (OUTPATIENT)
Dept: CARDIOLOGY | Facility: CLINIC | Age: 68
End: 2018-04-03
Payer: SELF-PAY

## 2018-04-03 PROCEDURE — 85610 PROTHROMBIN TIME: CPT | Mod: QW

## 2018-04-03 PROCEDURE — 99213 OFFICE O/P EST LOW 20 MIN: CPT

## 2018-04-03 PROCEDURE — 36416 COLLJ CAPILLARY BLOOD SPEC: CPT

## 2018-04-10 ENCOUNTER — APPOINTMENT (OUTPATIENT)
Dept: CARDIOLOGY | Facility: CLINIC | Age: 68
End: 2018-04-10
Payer: SELF-PAY

## 2018-04-10 PROCEDURE — 36416 COLLJ CAPILLARY BLOOD SPEC: CPT

## 2018-04-10 PROCEDURE — 85610 PROTHROMBIN TIME: CPT | Mod: QW

## 2018-04-10 PROCEDURE — 99213 OFFICE O/P EST LOW 20 MIN: CPT

## 2018-04-17 ENCOUNTER — APPOINTMENT (OUTPATIENT)
Dept: CARDIOLOGY | Facility: CLINIC | Age: 68
End: 2018-04-17
Payer: SELF-PAY

## 2018-04-17 DIAGNOSIS — R79.1 ABNORMAL COAGULATION PROFILE: ICD-10-CM

## 2018-04-17 PROCEDURE — 99213 OFFICE O/P EST LOW 20 MIN: CPT

## 2018-04-17 PROCEDURE — 36416 COLLJ CAPILLARY BLOOD SPEC: CPT

## 2018-04-17 PROCEDURE — 85610 PROTHROMBIN TIME: CPT | Mod: QW

## 2018-04-17 RX ORDER — WARFARIN 7.5 MG/1
7.5 TABLET ORAL
Qty: 135 | Refills: 3 | Status: ACTIVE | COMMUNITY
Start: 2018-04-17 | End: 1900-01-01

## 2018-04-24 ENCOUNTER — APPOINTMENT (OUTPATIENT)
Dept: CARDIOLOGY | Facility: CLINIC | Age: 68
End: 2018-04-24
Payer: SELF-PAY

## 2018-04-24 DIAGNOSIS — Z79.01 ENCOUNTER FOR THERAPEUTIC DRUG LVL MONITORING: ICD-10-CM

## 2018-04-24 DIAGNOSIS — R79.1 ABNORMAL COAGULATION PROFILE: ICD-10-CM

## 2018-04-24 DIAGNOSIS — Z51.81 ENCOUNTER FOR THERAPEUTIC DRUG LVL MONITORING: ICD-10-CM

## 2018-04-24 PROCEDURE — 36416 COLLJ CAPILLARY BLOOD SPEC: CPT

## 2018-04-24 PROCEDURE — 99213 OFFICE O/P EST LOW 20 MIN: CPT

## 2018-04-24 PROCEDURE — 85610 PROTHROMBIN TIME: CPT | Mod: QW

## 2018-05-01 ENCOUNTER — APPOINTMENT (OUTPATIENT)
Dept: CARDIOLOGY | Facility: CLINIC | Age: 68
End: 2018-05-01
Payer: SELF-PAY

## 2018-05-01 DIAGNOSIS — Z71.89 OTHER SPECIFIED COUNSELING: ICD-10-CM

## 2018-05-01 DIAGNOSIS — I82.C12 ACUTE EMBOLISM AND THROMBOSIS OF LEFT INTERNAL JUGULAR VEIN: ICD-10-CM

## 2018-05-01 PROCEDURE — 99213 OFFICE O/P EST LOW 20 MIN: CPT

## 2018-05-01 PROCEDURE — 36416 COLLJ CAPILLARY BLOOD SPEC: CPT

## 2018-05-01 PROCEDURE — 85610 PROTHROMBIN TIME: CPT | Mod: QW

## 2018-05-15 ENCOUNTER — APPOINTMENT (OUTPATIENT)
Dept: CARDIOLOGY | Facility: CLINIC | Age: 68
End: 2018-05-15

## 2018-05-27 ENCOUNTER — INPATIENT (INPATIENT)
Facility: HOSPITAL | Age: 68
LOS: 0 days | Discharge: HOME | End: 2018-05-28
Attending: GENERAL PRACTICE | Admitting: GENERAL PRACTICE
Payer: MEDICARE

## 2018-05-27 ENCOUNTER — TRANSCRIPTION ENCOUNTER (OUTPATIENT)
Age: 68
End: 2018-05-27

## 2018-05-27 VITALS
HEART RATE: 101 BPM | DIASTOLIC BLOOD PRESSURE: 89 MMHG | OXYGEN SATURATION: 100 % | SYSTOLIC BLOOD PRESSURE: 158 MMHG | RESPIRATION RATE: 15 BRPM

## 2018-05-27 DIAGNOSIS — E87.6 HYPOKALEMIA: ICD-10-CM

## 2018-05-27 DIAGNOSIS — C16.9 MALIGNANT NEOPLASM OF STOMACH, UNSPECIFIED: ICD-10-CM

## 2018-05-27 DIAGNOSIS — D64.9 ANEMIA, UNSPECIFIED: ICD-10-CM

## 2018-05-27 DIAGNOSIS — Z98.890 OTHER SPECIFIED POSTPROCEDURAL STATES: Chronic | ICD-10-CM

## 2018-05-27 DIAGNOSIS — S06.5X0A TRAUMATIC SUBDURAL HEMORRHAGE WITHOUT LOSS OF CONSCIOUSNESS, INITIAL ENCOUNTER: ICD-10-CM

## 2018-05-27 DIAGNOSIS — Z79.899 OTHER LONG TERM (CURRENT) DRUG THERAPY: ICD-10-CM

## 2018-05-27 DIAGNOSIS — Z91.040 LATEX ALLERGY STATUS: ICD-10-CM

## 2018-05-27 DIAGNOSIS — C78.6 SECONDARY MALIGNANT NEOPLASM OF RETROPERITONEUM AND PERITONEUM: ICD-10-CM

## 2018-05-27 DIAGNOSIS — Z90.710 ACQUIRED ABSENCE OF BOTH CERVIX AND UTERUS: Chronic | ICD-10-CM

## 2018-05-27 DIAGNOSIS — Z90.49 ACQUIRED ABSENCE OF OTHER SPECIFIED PARTS OF DIGESTIVE TRACT: Chronic | ICD-10-CM

## 2018-05-27 DIAGNOSIS — R18.8 OTHER ASCITES: ICD-10-CM
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
## 2018-05-27 DIAGNOSIS — E11.22 TYPE 2 DIABETES MELLITUS WITH DIABETIC CHRONIC KIDNEY DISEASE: ICD-10-CM

## 2018-05-27 DIAGNOSIS — Z96.652 PRESENCE OF LEFT ARTIFICIAL KNEE JOINT: Chronic | ICD-10-CM

## 2018-05-27 DIAGNOSIS — K31.1 ADULT HYPERTROPHIC PYLORIC STENOSIS: ICD-10-CM
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
## 2018-05-27 DIAGNOSIS — N18.3 CHRONIC KIDNEY DISEASE, STAGE 3 (MODERATE): ICD-10-CM

## 2018-05-27 DIAGNOSIS — I50.32 CHRONIC DIASTOLIC (CONGESTIVE) HEART FAILURE: ICD-10-CM

## 2018-05-27 DIAGNOSIS — E43 UNSPECIFIED SEVERE PROTEIN-CALORIE MALNUTRITION: ICD-10-CM

## 2018-05-27 DIAGNOSIS — S12.100A UNSPECIFIED DISPLACED FRACTURE OF SECOND CERVICAL VERTEBRA, INITIAL ENCOUNTER FOR CLOSED FRACTURE: Chronic | ICD-10-CM

## 2018-05-27 DIAGNOSIS — C79.51 SECONDARY MALIGNANT NEOPLASM OF BONE: ICD-10-CM

## 2018-05-27 LAB
ADD ON TEST-SPECIMEN IN LAB: SIGNIFICANT CHANGE UP
ALBUMIN SERPL ELPH-MCNC: 2.6 G/DL — LOW (ref 3.3–5)
ALLERGY+IMMUNOLOGY DIAG STUDY NOTE: SIGNIFICANT CHANGE UP
ALP SERPL-CCNC: 117 U/L — SIGNIFICANT CHANGE UP (ref 40–120)
ALT FLD-CCNC: 20 U/L — SIGNIFICANT CHANGE UP (ref 12–78)
ANION GAP SERPL CALC-SCNC: 6 MMOL/L — SIGNIFICANT CHANGE UP (ref 5–17)
ANION GAP SERPL CALC-SCNC: 8 MMOL/L — SIGNIFICANT CHANGE UP (ref 5–17)
APTT BLD: 31.9 SEC — SIGNIFICANT CHANGE UP (ref 27.5–37.4)
AST SERPL-CCNC: 20 U/L — SIGNIFICANT CHANGE UP (ref 15–37)
BASOPHILS # BLD AUTO: 0.01 K/UL — SIGNIFICANT CHANGE UP (ref 0–0.2)
BASOPHILS # BLD AUTO: 0.03 K/UL — SIGNIFICANT CHANGE UP (ref 0–0.2)
BASOPHILS NFR BLD AUTO: 0.2 % — SIGNIFICANT CHANGE UP (ref 0–2)
BASOPHILS NFR BLD AUTO: 0.5 % — SIGNIFICANT CHANGE UP (ref 0–2)
BILIRUB SERPL-MCNC: 0.5 MG/DL — SIGNIFICANT CHANGE UP (ref 0.2–1.2)
BUN SERPL-MCNC: 18 MG/DL — SIGNIFICANT CHANGE UP (ref 7–23)
BUN SERPL-MCNC: 18 MG/DL — SIGNIFICANT CHANGE UP (ref 7–23)
CALCIUM SERPL-MCNC: 8.3 MG/DL — LOW (ref 8.5–10.1)
CALCIUM SERPL-MCNC: 8.8 MG/DL — SIGNIFICANT CHANGE UP (ref 8.5–10.1)
CHLORIDE SERPL-SCNC: 104 MMOL/L — SIGNIFICANT CHANGE UP (ref 96–108)
CHLORIDE SERPL-SCNC: 105 MMOL/L — SIGNIFICANT CHANGE UP (ref 96–108)
CO2 SERPL-SCNC: 25 MMOL/L — SIGNIFICANT CHANGE UP (ref 22–31)
CO2 SERPL-SCNC: 27 MMOL/L — SIGNIFICANT CHANGE UP (ref 22–31)
CREAT SERPL-MCNC: 0.87 MG/DL — SIGNIFICANT CHANGE UP (ref 0.5–1.3)
CREAT SERPL-MCNC: 0.95 MG/DL — SIGNIFICANT CHANGE UP (ref 0.5–1.3)
EOSINOPHIL # BLD AUTO: 0.14 K/UL — SIGNIFICANT CHANGE UP (ref 0–0.5)
EOSINOPHIL # BLD AUTO: 0.15 K/UL — SIGNIFICANT CHANGE UP (ref 0–0.5)
EOSINOPHIL NFR BLD AUTO: 2.3 % — SIGNIFICANT CHANGE UP (ref 0–6)
EOSINOPHIL NFR BLD AUTO: 2.5 % — SIGNIFICANT CHANGE UP (ref 0–6)
GLUCOSE SERPL-MCNC: 111 MG/DL — HIGH (ref 70–99)
GLUCOSE SERPL-MCNC: 158 MG/DL — HIGH (ref 70–99)
HCT VFR BLD CALC: 24.9 % — LOW (ref 34.5–45)
HCT VFR BLD CALC: 27.4 % — LOW (ref 34.5–45)
HGB BLD-MCNC: 7.8 G/DL — LOW (ref 11.5–15.5)
HGB BLD-MCNC: 8.6 G/DL — LOW (ref 11.5–15.5)
IMM GRANULOCYTES NFR BLD AUTO: 0.3 % — SIGNIFICANT CHANGE UP (ref 0–1.5)
IMM GRANULOCYTES NFR BLD AUTO: 0.4 % — SIGNIFICANT CHANGE UP (ref 0–1.5)
INR BLD: 1.27 RATIO — HIGH (ref 0.88–1.16)
LYMPHOCYTES # BLD AUTO: 0.82 K/UL — LOW (ref 1–3.3)
LYMPHOCYTES # BLD AUTO: 1.2 K/UL — SIGNIFICANT CHANGE UP (ref 1–3.3)
LYMPHOCYTES # BLD AUTO: 12.5 % — LOW (ref 13–44)
LYMPHOCYTES # BLD AUTO: 21.6 % — SIGNIFICANT CHANGE UP (ref 13–44)
MAGNESIUM SERPL-MCNC: 1.7 MG/DL — SIGNIFICANT CHANGE UP (ref 1.6–2.6)
MCHC RBC-ENTMCNC: 24.6 PG — LOW (ref 27–34)
MCHC RBC-ENTMCNC: 25.3 PG — LOW (ref 27–34)
MCHC RBC-ENTMCNC: 31.3 GM/DL — LOW (ref 32–36)
MCHC RBC-ENTMCNC: 31.4 GM/DL — LOW (ref 32–36)
MCV RBC AUTO: 78.5 FL — LOW (ref 80–100)
MCV RBC AUTO: 80.6 FL — SIGNIFICANT CHANGE UP (ref 80–100)
MONOCYTES # BLD AUTO: 0.71 K/UL — SIGNIFICANT CHANGE UP (ref 0–0.9)
MONOCYTES # BLD AUTO: 0.83 K/UL — SIGNIFICANT CHANGE UP (ref 0–0.9)
MONOCYTES NFR BLD AUTO: 10.9 % — SIGNIFICANT CHANGE UP (ref 2–14)
MONOCYTES NFR BLD AUTO: 15 % — HIGH (ref 2–14)
NEUTROPHILS # BLD AUTO: 3.35 K/UL — SIGNIFICANT CHANGE UP (ref 1.8–7.4)
NEUTROPHILS # BLD AUTO: 4.81 K/UL — SIGNIFICANT CHANGE UP (ref 1.8–7.4)
NEUTROPHILS NFR BLD AUTO: 60.3 % — SIGNIFICANT CHANGE UP (ref 43–77)
NEUTROPHILS NFR BLD AUTO: 73.5 % — SIGNIFICANT CHANGE UP (ref 43–77)
NRBC # BLD: 0 /100 WBCS — SIGNIFICANT CHANGE UP (ref 0–0)
NRBC # BLD: 0 /100 WBCS — SIGNIFICANT CHANGE UP (ref 0–0)
PLATELET # BLD AUTO: 170 K/UL — SIGNIFICANT CHANGE UP (ref 150–400)
PLATELET # BLD AUTO: 175 K/UL — SIGNIFICANT CHANGE UP (ref 150–400)
POTASSIUM SERPL-MCNC: 3.3 MMOL/L — LOW (ref 3.5–5.3)
POTASSIUM SERPL-MCNC: 3.8 MMOL/L — SIGNIFICANT CHANGE UP (ref 3.5–5.3)
POTASSIUM SERPL-SCNC: 3.3 MMOL/L — LOW (ref 3.5–5.3)
POTASSIUM SERPL-SCNC: 3.8 MMOL/L — SIGNIFICANT CHANGE UP (ref 3.5–5.3)
PROT SERPL-MCNC: 5.9 GM/DL — LOW (ref 6–8.3)
PROTHROM AB SERPL-ACNC: 13.8 SEC — HIGH (ref 9.8–12.7)
RBC # BLD: 3.17 M/UL — LOW (ref 3.8–5.2)
RBC # BLD: 3.4 M/UL — LOW (ref 3.8–5.2)
RBC # FLD: 15.9 % — HIGH (ref 10.3–14.5)
RBC # FLD: 16 % — HIGH (ref 10.3–14.5)
SODIUM SERPL-SCNC: 137 MMOL/L — SIGNIFICANT CHANGE UP (ref 135–145)
SODIUM SERPL-SCNC: 138 MMOL/L — SIGNIFICANT CHANGE UP (ref 135–145)
WBC # BLD: 5.55 K/UL — SIGNIFICANT CHANGE UP (ref 3.8–10.5)
WBC # BLD: 6.54 K/UL — SIGNIFICANT CHANGE UP (ref 3.8–10.5)
WBC # FLD AUTO: 5.55 K/UL — SIGNIFICANT CHANGE UP (ref 3.8–10.5)
WBC # FLD AUTO: 6.54 K/UL — SIGNIFICANT CHANGE UP (ref 3.8–10.5)

## 2018-05-27 PROCEDURE — 74176 CT ABD & PELVIS W/O CONTRAST: CPT | Mod: 26

## 2018-05-27 PROCEDURE — 93010 ELECTROCARDIOGRAM REPORT: CPT

## 2018-05-27 PROCEDURE — 72125 CT NECK SPINE W/O DYE: CPT | Mod: 26

## 2018-05-27 PROCEDURE — 70450 CT HEAD/BRAIN W/O DYE: CPT | Mod: 26,77

## 2018-05-27 PROCEDURE — 99285 EMERGENCY DEPT VISIT HI MDM: CPT

## 2018-05-27 PROCEDURE — 70450 CT HEAD/BRAIN W/O DYE: CPT | Mod: 26

## 2018-05-27 PROCEDURE — 73562 X-RAY EXAM OF KNEE 3: CPT | Mod: 26,RT

## 2018-05-27 RX ORDER — SUCRALFATE 1 G
1 TABLET ORAL ONCE
Qty: 0 | Refills: 0 | Status: COMPLETED | OUTPATIENT
Start: 2018-05-27 | End: 2018-05-27

## 2018-05-27 RX ORDER — FLUOROURACIL 50 MG/ML
0 INJECTION, SOLUTION INTRAVENOUS
Qty: 0 | Refills: 0 | COMMUNITY

## 2018-05-27 RX ORDER — SUCRALFATE 1 G
1 TABLET ORAL
Qty: 0 | Refills: 0 | COMMUNITY

## 2018-05-27 RX ORDER — SODIUM CHLORIDE 9 MG/ML
1000 INJECTION INTRAMUSCULAR; INTRAVENOUS; SUBCUTANEOUS
Qty: 0 | Refills: 0 | Status: DISCONTINUED | OUTPATIENT
Start: 2018-05-27 | End: 2018-05-27

## 2018-05-27 RX ORDER — PROCHLORPERAZINE MALEATE 5 MG
1 TABLET ORAL
Qty: 0 | Refills: 0 | COMMUNITY

## 2018-05-27 RX ORDER — SENNA PLUS 8.6 MG/1
1 TABLET ORAL
Qty: 0 | Refills: 0 | Status: DISCONTINUED | OUTPATIENT
Start: 2018-05-27 | End: 2018-05-27

## 2018-05-27 RX ORDER — LISINOPRIL 2.5 MG/1
10 TABLET ORAL ONCE
Qty: 0 | Refills: 0 | Status: COMPLETED | OUTPATIENT
Start: 2018-05-27 | End: 2018-05-27

## 2018-05-27 RX ORDER — LISINOPRIL 2.5 MG/1
1 TABLET ORAL
Qty: 0 | Refills: 0 | COMMUNITY
Start: 2018-05-27

## 2018-05-27 RX ORDER — DOCUSATE SODIUM 100 MG
100 CAPSULE ORAL
Qty: 0 | Refills: 0 | Status: DISCONTINUED | OUTPATIENT
Start: 2018-05-27 | End: 2018-05-27

## 2018-05-27 RX ORDER — ACETAMINOPHEN 500 MG
650 TABLET ORAL EVERY 4 HOURS
Qty: 0 | Refills: 0 | Status: DISCONTINUED | OUTPATIENT
Start: 2018-05-27 | End: 2018-05-27

## 2018-05-27 RX ORDER — LISINOPRIL 2.5 MG/1
10 TABLET ORAL DAILY
Qty: 0 | Refills: 0 | Status: DISCONTINUED | OUTPATIENT
Start: 2018-05-27 | End: 2018-05-27

## 2018-05-27 RX ORDER — OXYCODONE HYDROCHLORIDE 5 MG/1
10 TABLET ORAL EVERY 4 HOURS
Qty: 0 | Refills: 0 | Status: DISCONTINUED | OUTPATIENT
Start: 2018-05-27 | End: 2018-05-27

## 2018-05-27 RX ORDER — PANTOPRAZOLE SODIUM 20 MG/1
40 TABLET, DELAYED RELEASE ORAL
Qty: 0 | Refills: 0 | Status: DISCONTINUED | OUTPATIENT
Start: 2018-05-27 | End: 2018-05-27

## 2018-05-27 RX ORDER — AMLODIPINE BESYLATE 2.5 MG/1
1 TABLET ORAL
Qty: 0 | Refills: 0 | COMMUNITY

## 2018-05-27 RX ORDER — POTASSIUM CHLORIDE 20 MEQ
10 PACKET (EA) ORAL ONCE
Qty: 0 | Refills: 0 | Status: COMPLETED | OUTPATIENT
Start: 2018-05-27 | End: 2018-05-27

## 2018-05-27 RX ORDER — OXYCODONE HYDROCHLORIDE 5 MG/1
1 TABLET ORAL
Qty: 0 | Refills: 0 | COMMUNITY

## 2018-05-27 RX ORDER — RANITIDINE HYDROCHLORIDE 150 MG/1
1 TABLET, FILM COATED ORAL
Qty: 0 | Refills: 0 | COMMUNITY

## 2018-05-27 RX ORDER — SIMVASTATIN 20 MG/1
1 TABLET, FILM COATED ORAL
Qty: 0 | Refills: 0 | COMMUNITY

## 2018-05-27 RX ORDER — SIMVASTATIN 20 MG/1
10 TABLET, FILM COATED ORAL AT BEDTIME
Qty: 0 | Refills: 0 | Status: DISCONTINUED | OUTPATIENT
Start: 2018-05-27 | End: 2018-05-27

## 2018-05-27 RX ORDER — PROCHLORPERAZINE MALEATE 5 MG
10 TABLET ORAL
Qty: 0 | Refills: 0 | Status: DISCONTINUED | OUTPATIENT
Start: 2018-05-27 | End: 2018-05-27

## 2018-05-27 RX ORDER — PANTOPRAZOLE SODIUM 20 MG/1
1 TABLET, DELAYED RELEASE ORAL
Qty: 0 | Refills: 0 | COMMUNITY

## 2018-05-27 RX ORDER — OXALIPLATIN 5 MG/ML
0 INJECTION, SOLUTION INTRAVENOUS
Qty: 0 | Refills: 0 | COMMUNITY

## 2018-05-27 RX ORDER — AMLODIPINE BESYLATE 2.5 MG/1
5 TABLET ORAL AT BEDTIME
Qty: 0 | Refills: 0 | Status: DISCONTINUED | OUTPATIENT
Start: 2018-05-27 | End: 2018-05-27

## 2018-05-27 RX ORDER — ONDANSETRON 8 MG/1
1 TABLET, FILM COATED ORAL
Qty: 0 | Refills: 0 | COMMUNITY

## 2018-05-27 RX ORDER — ONDANSETRON 8 MG/1
4 TABLET, FILM COATED ORAL EVERY 6 HOURS
Qty: 0 | Refills: 0 | Status: DISCONTINUED | OUTPATIENT
Start: 2018-05-27 | End: 2018-05-27

## 2018-05-27 RX ADMIN — LISINOPRIL 10 MILLIGRAM(S): 2.5 TABLET ORAL at 06:31

## 2018-05-27 RX ADMIN — Medication 1 TABLET(S): at 13:28

## 2018-05-27 RX ADMIN — Medication 0.2 MILLIGRAM(S): at 16:06

## 2018-05-27 RX ADMIN — LISINOPRIL 10 MILLIGRAM(S): 2.5 TABLET ORAL at 13:28

## 2018-05-27 RX ADMIN — Medication 1 GRAM(S): at 07:35

## 2018-05-27 RX ADMIN — PANTOPRAZOLE SODIUM 40 MILLIGRAM(S): 20 TABLET, DELAYED RELEASE ORAL at 06:31

## 2018-05-27 RX ADMIN — Medication 100 MILLIEQUIVALENT(S): at 13:29

## 2018-05-27 RX ADMIN — PANTOPRAZOLE SODIUM 40 MILLIGRAM(S): 20 TABLET, DELAYED RELEASE ORAL at 18:03

## 2018-05-27 RX ADMIN — SODIUM CHLORIDE 80 MILLILITER(S): 9 INJECTION INTRAMUSCULAR; INTRAVENOUS; SUBCUTANEOUS at 06:31

## 2018-05-27 NOTE — H&P ADULT - PMH
Benign neoplasm of brain, unspecified brain region    Breast CA    CKD (chronic kidney disease) stage 3, GFR 30-59 ml/min    Diabetes    Diastolic CHF, chronic    Gastric carcinoma    Hyperthyroidism    Metastatic breast cancer  to C2 requiring spinal fusion  Pleural effusion

## 2018-05-27 NOTE — ED ADULT NURSE NOTE - OBJECTIVE STATEMENT
trip and fall from standing height. reports tripping over dog. trip and fall from standing height. reports tripping over dog. no LOC. reports hitting face on ground. epistaxis after fall, bleeding controlled prior to arrival. no LOC. alert and oriented x 4. mild right knee pain. patient was on coumadin, taken off 2 weeks ago due to high INR. Yes

## 2018-05-27 NOTE — PROGRESS NOTE ADULT - ASSESSMENT
68 yo fem s/p fall, small SDH  -Advance diet  -Heplock IV  -OOB  -F/u head CT  -F/u Neurosx recommendations 66 yo fem s/p fall, small SDH  -Advance diet  -Heplock IV  -OOB  -F/u head CT  -F/u Neurosx recommendations  -Will wtach H/H (due to epistaxis, dilutional)

## 2018-05-27 NOTE — CONSULT NOTE ADULT - ASSESSMENT
Gastric cancer, with likely partial gastric outlet obstruction in association with peritoneal carcinomatosis manifesting itself by ascites. However, for oncological evaluation has not been undertaken.    Concern regarding gastric outlet obstruction discussed with them, suggest puréed diet.  Neurological evaluation/neurosurgery. Once stable, follow up for endoscopy at [place]. Consideration will be given to stent placement.    Microcytic anemia, hemoglobin has improved from a hemoglobin of 4 a few weeks ago for family.    Had a long discussion with patient and family regarding malnutrition as well as nutritional supplements including ensure      SDH de to fall

## 2018-05-27 NOTE — ED PROVIDER NOTE - OBJECTIVE STATEMENT
68 y/o female in ED c/o epistaxis s/p trip and fall x 1 hr.  pt states that she tripped over the family dog hitting face on floor.  pt denies any LOC, HA, neck pain, cp, sob, n/v/d/abd pain.   states had epistaxis after incident.  also c/o right knee pain.   pt states was taken off coumadin 3 wks ago.

## 2018-05-27 NOTE — H&P ADULT - NSHPPHYSICALEXAM_GEN_ALL_CORE
T(C): 36.7 (05-27-18 @ 03:45), Max: 36.7 (05-27-18 @ 03:00)  HR: 99 (05-27-18 @ 03:45) (99 - 103)  BP: 154/103 (05-27-18 @ 03:45) (154/103 - 159/88)  RR: 13 (05-27-18 @ 03:45) (13 - 15)  SpO2: 100% (05-27-18 @ 03:45) (100% - 100%)    Skin:  warm and dry    HEENT: NC/AT,  PERRLA, no otorrhea, nares swollen and dried blood, buccal mucosa pink and moist    Neck:  non tender to palpation, trachea midline, no JVD    Respiratory: Lungs clear and equal bilat, no r/r/w    Chest:  Right chest with pleural tap port., left ACW with mediport    Cardiovascular:  S1S2 reg, no M    Gastrointestinal:  non-distended, +NABS, soft, non-tender, no masses palpated    Extremities:  no cyanosis or clubbing, mild bilateral pedal edema noted left greater that right    Vascular:  2+/4+ bilateral    Neurological:  A & O X3, CNII-XII grossly intact, motor and sensory intact and equal bilat

## 2018-05-27 NOTE — H&P ADULT - ASSESSMENT
Ass:  S/P Fall, SDH, c/o recent inability to tolerate solids and abdominal distention after meals with nausea and vomiting  Plan:  Admit to Dr. Guajardo.  As per Neurosurgery, Admitted to SD, neurochecks Q2, repeat CT head in 6-8hrs(last scan done 2am).  NPO except meds. CT Abd/Pelvis in am with CThead to follow up pts complaints.  Consult Hospitialist for medical management and Consult Dr. Manning at pts request. Ass:  S/P Fall, SDH, c/o recent inability to tolerate solids and abdominal distention after meals with nausea and vomiting, history of right sided recurrent pleural effusions with drain in place, mediport left anterior chest, last chemotherapy 2 months ago.  Plan:  Admit to Dr. Guajardo.  As per Neurosurgery, Admitted to SD, neurochecks Q2, repeat CT head in 6-8hrs(last scan done 2am).  NPO except meds. CT Abd/Pelvis in am with CThead to follow up pts complaints.  Consult Hospitialist for medical management and Consult Dr. Manning at pts request.

## 2018-05-27 NOTE — PROGRESS NOTE ADULT - SUBJECTIVE AND OBJECTIVE BOX
68 yo female admitted for monitoring of Right para falx subdural hematoma.  EXAM: alert and oriented. Pupils equal and reactive. CN 2-12 intact , good power in extremities.  Follow up CT Brain pending.

## 2018-05-27 NOTE — DISCHARGE NOTE ADULT - CARE PROVIDER_API CALL
Husam Bolanos), Neurological Surgery  [address] [place] [address]  Phone: [phone]  Fax: [phone]

## 2018-05-27 NOTE — DISCHARGE NOTE ADULT - MEDICATION SUMMARY - MEDICATIONS TO STOP TAKING
I will STOP taking the medications listed below when I get home from the hospital:    oxaliplatin    fluorouracil    oxyCODONE 5 mg oral tablet  -- 1 tab(s) by mouth every 4 hours, As Needed for pain mdd: 4 tab    raNITIdine 150 mg oral tablet  -- 1 tab(s) by mouth once a day    enoxaparin 60 mg/0.6 mL injectable solution  -- 60 milligram(s) injectable every 12 hours   -- It is very important that you take or use this exactly as directed.  Do not skip doses or discontinue unless directed by your doctor.    Coumadin 2.5 mg oral tablet  -- 1 tab(s) by mouth once a day (in the evening)   -- Do not take this drug if you are pregnant.  It is very important that you take or use this exactly as directed.  Do not skip doses or discontinue unless directed by your doctor.  Obtain medical advice before taking any non-prescription drugs as some may affect the action of this medication.

## 2018-05-27 NOTE — H&P ADULT - HISTORY OF PRESENT ILLNESS
Patient is a 67y old  Female who presents with a chief complaint of 68 y/o female in ED c/o epistaxis s/p trip and fall x 1 hr.  pt states that she tripped over the family dog hitting face on floor.  pt denies any LOC, HA, neck pain, cp, sob, n/v/d/abd pain.   states had epistaxis after incident.  also c/o right knee pain.   pt states was taken off coumadin 3 wks ago.      PAST MEDICAL & SURGICAL HISTORY:  Gastric carcinoma  CKD (chronic kidney disease) stage 3, GFR 30-59 ml/min  Diabetes  Hyperthyroidism  Diastolic CHF, chronic  Pleural effusion  Breast CA  History of thoracentesis: 3/2017  C2 cervical fracture: s/p Fusion  History of lymph node dissection of both axillae  S/P cholecystectomy  H/O: hysterectomy      MEDICATIONS  (STANDING):  amLODIPine   Tablet 5 milliGRAM(s) Oral at bedtime  calcium carbonate 1250 mG + Vitamin D (OsCal 500 + D) 1 Tablet(s) Oral daily  multivitamin 1 Tablet(s) Oral daily  pantoprazole    Tablet 40 milliGRAM(s) Oral two times a day  simvastatin 10 milliGRAM(s) Oral at bedtime  sodium chloride 0.9%. 1000 milliLiter(s) (80 mL/Hr) IV Continuous <Continuous>  sucralfate 1 Gram(s) Oral once

## 2018-05-27 NOTE — ED ADULT NURSE REASSESSMENT NOTE - NS ED NURSE REASSESS COMMENT FT1
patient arrived to ED after tripping over dog at home and falling forward hitting face on floor. epistaxis after fall, bleeding stopped prior to arrival. denies LOC. alert and oriented x4. PERRL. respirations even and unlabored. ms strength 5/5 upper and lower extremities bilaterally. ambulatory on arrival. denies headache, dizziness, nausea. c/o pain to nose and right knee. patient reports she was taking coumadin until about 2 weeks ago when it was stopped due to high INR result. placed in bed 4 on monitor. trauma alert called on arrival, patient seen and assessed by dr. merritt, trauma alert cancelled at 0040. family at bedside. will continue to monitor.

## 2018-05-27 NOTE — ED ADULT TRIAGE NOTE - CHIEF COMPLAINT QUOTE
unwitnessed mechanical fall from standing height, tripped over dog and fell forward onto face. epistaxis on arrival. denies LOC.

## 2018-05-27 NOTE — H&P ADULT - NSHPLABSRESULTS_GEN_ALL_CORE
8.6    6.54  )-----------( 175      ( 27 May 2018 01:30 )             27.4       05-27    137  |  104  |  18  ----------------------------<  158<H>  3.8   |  27  |  0.95    Ca    8.8      27 May 2018 02:30    TPro  5.9<L>  /  Alb  2.6<L>  /  TBili  0.5  /  DBili  x   /  AST  20  /  ALT  20  /  AlkPhos  117  05-27    EXAM:  CT BRAIN                            PROCEDURE DATE:  05/27/2018    IMPRESSION:   Small acute right parafalcine subdural hematoma measuring up to 6 mm in   caliber that slightly extends along the right cerebellar tentorium.    Patient is status post left parietal craniotomy and cranioplasty.  There   is no calvarial or skull base fracture.  Occipital cervical fusion   hardware is partially visualized.    EXAM:  CT CERVICAL SPINE                        PROCEDURE DATE:  05/27/2018    IMPRESSION:   1.  No CT evidence of cervical spine fracture or traumatic malalignment.  2.  Patchy sclerosis in the body of C2 extending into the left C2   pedicle, sclerosis in the C5 vertebral body and mixed sclerosis and   lucency in the T1 vertebral body suspicious for osseous metastatic   disease.  3.  Indeterminate heterogeneous thyroid lesions bilaterally measuring up   to 2 cm in the right and 1.7 cm in the left.  Thyroid ultrasound can be   performed as clinically warranted.

## 2018-05-27 NOTE — ED PROVIDER NOTE - PROGRESS NOTE DETAILS
CT noted.   case d/w Casandra (trauma attending) and will have PA evaluate pt. case d/w Luis Miguel (neurosurgery attending) and recommend trauma admit and will have neurosurgery PA evaluate pt

## 2018-05-27 NOTE — DISCHARGE NOTE ADULT - MEDICATION SUMMARY - MEDICATIONS TO CHANGE
I will SWITCH the dose or number of times a day I take the medications listed below when I get home from the hospital:    Calcium 600+D oral tablet  -- 1 tab(s) by mouth 2 times a day    simvastatin 10 mg oral tablet  -- 1 tab(s) by mouth once a day (at bedtime)    methIMAzole 10 mg oral tablet  -- 1 tab(s) by mouth once a day    amLODIPine 10 mg oral tablet  -- 1 tab(s) by mouth once a day    prochlorperazine 10 mg oral tablet  -- 1 tab(s) by mouth 4 times a day, As Needed for nausea    senna oral tablet  -- 1 tab(s) by mouth 2 times a day, As Needed    docusate sodium 100 mg oral capsule  -- 1 cap(s) by mouth 2 times a day, As Needed for constipation    ondansetron 4 mg oral tablet  -- 1 tab(s) by mouth every 6 hours, As Needed for nausea    [facility] oral tablet  -- 1 tab(s) by mouth once a day    Protonix 40 mg oral delayed release tablet  -- 1 cap(s) by mouth 2 times a day    sucralfate 1 g oral tablet  -- 1 tab(s) by mouth once a day (in the evening)

## 2018-05-27 NOTE — DISCHARGE NOTE ADULT - PLAN OF CARE
follow with DR amin Follow with DR Bolanos in 1 week, call to make appt Follow with medical oncologist, worsening bone metastasis on CT scan

## 2018-05-27 NOTE — DISCHARGE NOTE ADULT - HOSPITAL COURSE
68 yo fem who tripped while walking dog and hit her face against floor, had epistaxis and small RT SDH, Repeat Head CT showed resolution of SDH. Patient underwent Abd CT scan showing worsening bone mets due to gastric carcinoma

## 2018-05-27 NOTE — H&P ADULT - PSH
C2 cervical fracture  s/p Fusion  H/O: hysterectomy    History of lymph node dissection of both axillae    History of thoracentesis  3/2017  History of total left knee replacement    S/P cholecystectomy

## 2018-05-27 NOTE — PROGRESS NOTE ADULT - SUBJECTIVE AND OBJECTIVE BOX
66 yo fem s/p fall, resolved epistaxis, small RT Parafalcine SDH, no complaints last night. Patient h/o gastric carcinoma treated with chemotx ([facility]), last chemotx session 4 weeks ago.    Abd soft, NT  Awake, alert, OX2  Neuro intact              Vital Signs Last 24 Hrs  T(C): 36.7 (27 May 2018 09:40), Max: 37.4 (27 May 2018 05:37)  T(F): 98 (27 May 2018 09:40), Max: 99.3 (27 May 2018 05:37)  HR: 93 (27 May 2018 09:00) (93 - 105)  BP: 171/91 (27 May 2018 09:00) (140/74 - 171/91)  BP(mean): 112 (27 May 2018 09:00) (91 - 112)  RR: 24 (27 May 2018 09:00) (13 - 30)  SpO2: 100% (27 May 2018 09:00) (97% - 100%)                          7.8    5.55  )-----------( 170      ( 27 May 2018 07:48 )             24.9       05-27    138  |  105  |  18  ----------------------------<  111<H>  3.3<L>   |  25  |  0.87    Ca    8.3<L>      27 May 2018 07:48  Mg     1.7     05-27    TPro  5.9<L>  /  Alb  2.6<L>  /  TBili  0.5  /  DBili  x   /  AST  20  /  ALT  20  /  AlkPhos  117  05-27      LIVER FUNCTIONS - ( 27 May 2018 02:30 )  Alb: 2.6 g/dL / Pro: 5.9 gm/dL / ALK PHOS: 117 U/L / ALT: 20 U/L / AST: 20 U/L / GGT: x             MEDICATIONS  (STANDING):  amLODIPine   Tablet 5 milliGRAM(s) Oral at bedtime  calcium carbonate 1250 mG + Vitamin D (OsCal 500 + D) 1 Tablet(s) Oral daily  lisinopril 10 milliGRAM(s) Oral daily  multivitamin 1 Tablet(s) Oral daily  pantoprazole    Tablet 40 milliGRAM(s) Oral two times a day  potassium chloride  10 mEq/100 mL IVPB 10 milliEquivalent(s) IV Intermittent once  simvastatin 10 milliGRAM(s) Oral at bedtime    MEDICATIONS  (PRN):  docusate sodium 100 milliGRAM(s) Oral two times a day PRN Constipation  ondansetron    Tablet 4 milliGRAM(s) Oral every 6 hours PRN Nausea and/or Vomiting  oxyCODONE    IR 10 milliGRAM(s) Oral every 4 hours PRN Moderate Pain (4 - 6)  prochlorperazine   Tablet 10 milliGRAM(s) Oral four times a day PRN nausea  senna 1 Tablet(s) Oral two times a day PRN Constipation      MEDICATIONS  (STANDING):  amLODIPine   Tablet 5 milliGRAM(s) Oral at bedtime  calcium carbonate 1250 mG + Vitamin D (OsCal 500 + D) 1 Tablet(s) Oral daily  lisinopril 10 milliGRAM(s) Oral daily  multivitamin 1 Tablet(s) Oral daily  pantoprazole    Tablet 40 milliGRAM(s) Oral two times a day  potassium chloride  10 mEq/100 mL IVPB 10 milliEquivalent(s) IV Intermittent once  simvastatin 10 milliGRAM(s) Oral at bedtime

## 2018-05-27 NOTE — CONSULT NOTE ADULT - SUBJECTIVE AND OBJECTIVE BOX
HPI:  67y old  Female with PMH breast ca, DM, CHF, gastric carcinoma, VTE now off coumadin who presents with a chief complaint of epistaxis s/p trip and fall x 1 hr.  pt states that she tripped over the family dog hitting face on floor.  pt denies any LOC, HA, neck pain, cp, sob, n/v/d/abd pain.   states had epistaxis after incident.  also c/o right knee pain.   pt states was taken off coumadin 3 wks ago.          PAST MEDICAL & SURGICAL HISTORY:  Gastric carcinoma  CKD (chronic kidney disease) stage 3, GFR 30-59 ml/min  Diabetes  Hyperthyroidism  Diastolic CHF, chronic  Pleural effusion  Breast CA  History of thoracentesis: 3/2017  C2 cervical fracture: s/p Fusion  History of lymph node dissection of both axillae  S/P cholecystectomy  H/O: hysterectomy      MEDICATIONS  (STANDING):  amLODIPine   Tablet 5 milliGRAM(s) Oral at bedtime  calcium carbonate 1250 mG + Vitamin D (OsCal 500 + D) 1 Tablet(s) Oral daily  multivitamin 1 Tablet(s) Oral daily  pantoprazole    Tablet 40 milliGRAM(s) Oral two times a day  simvastatin 10 milliGRAM(s) Oral at bedtime  sodium chloride 0.9%. 1000 milliLiter(s) (80 mL/Hr) IV Continuous <Continuous>  sucralfate 1 Gram(s) Oral once (27 May 2018 04:22)      PAST MEDICAL & SURGICAL HISTORY:  Metastatic breast cancer: to C2 requiring spinal fusion  Benign neoplasm of brain, unspecified brain region  Gastric carcinoma  CKD (chronic kidney disease) stage 3, GFR 30-59 ml/min  Diabetes  Hyperthyroidism  Diastolic CHF, chronic  Pleural effusion  Breast CA  History of total left knee replacement  History of thoracentesis: 3/2017  C2 cervical fracture: s/p Fusion  History of lymph node dissection of both axillae  S/P cholecystectomy  H/O: hysterectomy      FAMILY HISTORY:  No pertinent family history in first degree relatives      Social history:    REVIEW OF SYSTEMS:  CONSTITUTIONAL: No weakness, fevers or chills  EYES/ENT: No visual changes;  No vertigo or throat pain   NECK: No pain or stiffness  RESPIRATORY: No cough, wheezing, hemoptysis; No shortness of breath  CARDIOVASCULAR: No chest pain or palpitations  GASTROINTESTINAL: No abdominal or epigastric pain. No nausea, vomiting, or hematemesis; No diarrhea or constipation. No melena or hematochezia.  GENITOURINARY: No dysuria, frequency or hematuria  NEUROLOGICAL: No numbness or weakness  SKIN: No itching, burning, rashes, or lesions   All other review of systems is negative unless indicated above    Vital Signs Last 24 Hrs  T(C): 37.4 (27 May 2018 05:37), Max: 37.4 (27 May 2018 05:37)  T(F): 99.3 (27 May 2018 05:37), Max: 99.3 (27 May 2018 05:37)  HR: 93 (27 May 2018 08:00) (93 - 105)  BP: 140/74 (27 May 2018 08:00) (140/74 - 163/79)  BP(mean): 91 (27 May 2018 08:00) (91 - 100)  RR: 30 (27 May 2018 08:00) (13 - 30)  SpO2: 98% (27 May 2018 08:00) (97% - 100%)    I&O's Summary    26 May 2018 07:01  -  27 May 2018 07:00  --------------------------------------------------------  IN: 80 mL / OUT: 0 mL / NET: 80 mL        CAPILLARY BLOOD GLUCOSE          PHYSICAL EXAM:    Constitutional: NAD, awake and alert, well-developed  HEENT: PERR, EOMI, Normal Hearing, MMM  Neck: Soft and supple, No LAD, No JVD  Respiratory: Breath sounds are clear bilaterally, No wheezing, rales or rhonchi  Cardiovascular: S1 and S2, regular rate and rhythm, no Murmurs, gallops or rubs  Gastrointestinal: Bowel Sounds present, soft, nontender, nondistended, no guarding, no rebound  Extremities: No peripheral edema  Vascular: 2+ peripheral pulses  Neurological: A/O x 3, no focal deficits  Musculoskeletal: 5/5 strength b/l upper and lower extremities  Skin: No rashes    latex (Unknown)  No Known Drug Allergies      MEDICATIONS:  MEDICATIONS  (STANDING):  amLODIPine   Tablet 5 milliGRAM(s) Oral at bedtime  calcium carbonate 1250 mG + Vitamin D (OsCal 500 + D) 1 Tablet(s) Oral daily  lisinopril 10 milliGRAM(s) Oral daily  multivitamin 1 Tablet(s) Oral daily  pantoprazole    Tablet 40 milliGRAM(s) Oral two times a day  simvastatin 10 milliGRAM(s) Oral at bedtime  sodium chloride 0.9%. 1000 milliLiter(s) (80 mL/Hr) IV Continuous <Continuous>      LABS: All Labs Reviewed:                        7.8    5.55  )-----------( 170      ( 27 May 2018 07:48 )             24.9     05-27    138  |  105  |  18  ----------------------------<  111<H>  3.3<L>   |  25  |  0.87    Ca    8.3<L>      27 May 2018 07:48    TPro  5.9<L>  /  Alb  2.6<L>  /  TBili  0.5  /  DBili  x   /  AST  20  /  ALT  20  /  AlkPhos  117  05-27    PT/INR - ( 27 May 2018 01:30 )   PT: 13.8 sec;   INR: 1.27 ratio         PTT - ( 27 May 2018 01:30 )  PTT:31.9 sec      ASSESSMENT AND PLAN:    67y old  Female with PMH breast ca, DM, CHF, gastric carcinoma, VTE now off coumadin who presents with a chief complaint of epistaxis s/p trip and fall x 1 hr.  pt states that she tripped over the family dog hitting face on floor.  pt denies any LOC, HA, neck pain, cp, sob, n/v/d/abd pain.   states had epistaxis after incident.  also c/o right knee pain.   pt states was taken off coumadin 3 wks ago.      Chronic Diastolic Heart Failure  -echo with preserved EF  -no signs of exacerbation    Microcytic Anemia  -baseline Hb 9-10, 7.8 today    *Mild hypokalemia  -check mag  -replete    *Severe protein calorie malnutrition  -albumin 2.6    *SDH  -per neurosurgery
Patient is a 67y old  Female who presents with a chief complaint of S/P fall, SDH (27 May 2018 04:22)      HPI:  Patient is a 67y old  Female who presents with a chief complaint of 68 y/o female in ED c/o epistaxis s/p trip and fall x 1 hr.  pt states that she tripped over the family dog hitting face on floor.  pt denies any LOC, HA, neck pain, cp, sob, n/v/d/abd pain.   states had epistaxis after incident.  also c/o right knee pain.   pt states was taken off coumadin 3 wks ago.    Patient was diagnosed with gastric cancer with signet cell, last year. She did not receive stent after prior endoscopy however, she did receive multiple courses, possible 12 courses, chemotherapy.    She states that her course was also complicated with anemia, hemoglobin of 4, while she was on Coumadin and anticoagulation was stopped.  Furthermore, she is noted increasing abdominal discomfort about an hour after eating associated with nausea and vomiting. She does not tolerate solids well but has done well with softer food  Denies any chest pain or shortness of breath.  Did have a fall and has a subdural hematoma.    They attempted doing endoscopy at [facility] recently however, there was some limitation but patient cannot explain to me, something about the fact that the size scope could not be accommodated and they had some airway issues, possibly.  Additionally, he strays per patient and daughter and she supports above      PAST MEDICAL & SURGICAL HISTORY:  Gastric carcinoma  CKD (chronic kidney disease) stage 3, GFR 30-59 ml/min  Diabetes  Hyperthyroidism  Diastolic CHF, chronic  Pleural effusion  Breast CA  History of thoracentesis: 3/2017  C2 cervical fracture: s/p Fusion  History of lymph node dissection of both axillae  S/P cholecystectomy  H/O: hysterectomy      MEDICATIONS  (STANDING):  amLODIPine   Tablet 5 milliGRAM(s) Oral at bedtime  calcium carbonate 1250 mG + Vitamin D (OsCal 500 + D) 1 Tablet(s) Oral daily  multivitamin 1 Tablet(s) Oral daily  pantoprazole    Tablet 40 milliGRAM(s) Oral two times a day  simvastatin 10 milliGRAM(s) Oral at bedtime  sodium chloride 0.9%. 1000 milliLiter(s) (80 mL/Hr) IV Continuous <Continuous>  sucralfate 1 Gram(s) Oral once (27 May 2018 04:22)      PAST MEDICAL & SURGICAL HISTORY:  Metastatic breast cancer: to C2 requiring spinal fusion  Benign neoplasm of brain, unspecified brain region  Gastric carcinoma  CKD (chronic kidney disease) stage 3, GFR 30-59 ml/min  Diabetes  Hyperthyroidism  Diastolic CHF, chronic  Pleural effusion  Breast CA  History of total left knee replacement  History of thoracentesis: 3/2017  C2 cervical fracture: s/p Fusion  History of lymph node dissection of both axillae  S/P cholecystectomy  H/O: hysterectomy      MEDICATIONS  (STANDING):  amLODIPine   Tablet 5 milliGRAM(s) Oral at bedtime  calcium carbonate 1250 mG + Vitamin D (OsCal 500 + D) 1 Tablet(s) Oral daily  lisinopril 10 milliGRAM(s) Oral daily  multivitamin 1 Tablet(s) Oral daily  pantoprazole    Tablet 40 milliGRAM(s) Oral two times a day  simvastatin 10 milliGRAM(s) Oral at bedtime    MEDICATIONS  (PRN):  acetaminophen   Tablet 650 milliGRAM(s) Oral every 4 hours PRN moderate pain  cloNIDine 0.2 milliGRAM(s) Oral four times a day PRN SBP over 160, DBP over 100  docusate sodium 100 milliGRAM(s) Oral two times a day PRN Constipation  ondansetron    Tablet 4 milliGRAM(s) Oral every 6 hours PRN Nausea and/or Vomiting  prochlorperazine   Tablet 10 milliGRAM(s) Oral four times a day PRN nausea  senna 1 Tablet(s) Oral two times a day PRN Constipation      Allergies    latex (Unknown)  No Known Drug Allergies    Intolerances        SOCIAL HISTORY:neg drugs, lives at home    FAMILY HISTORY:  No pertinent family history in first degree relatives      REVIEW OF SYSTEMS:    CONSTITUTIONAL: No weakness, fevers or chills  EYES/ENT: No visual changes;  No vertigo or throat pain   NECK: No pain or stiffness  RESPIRATORY: No cough, wheezing, hemoptysis; No shortness of breath  CARDIOVASCULAR: No chest pain or palpitations  GENITOURINARY: No dysuria, frequency or hematuria  NEUROLOGICAL: No numbness or weakness  SKIN: No itching, burning, rashes, or lesions   All other review of systems is negative unless indicated above.    Vital Signs Last 24 Hrs  T(C): 36.7 (27 May 2018 14:47), Max: 37.4 (27 May 2018 05:37)  T(F): 98.1 (27 May 2018 14:47), Max: 99.3 (27 May 2018 05:37)  HR: 93 (27 May 2018 12:00) (88 - 105)  BP: 157/84 (27 May 2018 11:00) (140/74 - 174/95)  BP(mean): 103 (27 May 2018 11:00) (91 - 116)  RR: 27 (27 May 2018 12:00) (13 - 30)  SpO2: 97% (27 May 2018 12:00) (97% - 100%)    PHYSICAL EXAM:    Constitutional: NAD, well-developed  HEENT: EOMI, throat clear  Neck: No LAD, supple  Respiratory: CTA and P  Cardiovascular: S1 and S2, RRR, no M  Gastrointestinal: BS+, soft, upper tenderness/ND, neg HSM,  Extremities: No peripheral edema, neg clubing, cyanosis  Vascular: 2+ peripheral pulses  Neurological: A/O x 3, no focal deficits  Psychiatric: Normal mood, normal affect  Skin: No rashes    LABS:  CBC Full  -  ( 27 May 2018 07:48 )  WBC Count : 5.55 K/uL  Hemoglobin : 7.8 g/dL  Hematocrit : 24.9 %  Platelet Count - Automated : 170 K/uL  Mean Cell Volume : 78.5 fl  Mean Cell Hemoglobin : 24.6 pg  Mean Cell Hemoglobin Concentration : 31.3 gm/dL  Auto Neutrophil # : 3.35 K/uL  Auto Lymphocyte # : 1.20 K/uL  Auto Monocyte # : 0.83 K/uL  Auto Eosinophil # : 0.14 K/uL  Auto Basophil # : 0.01 K/uL  Auto Neutrophil % : 60.3 %  Auto Lymphocyte % : 21.6 %  Auto Monocyte % : 15.0 %  Auto Eosinophil % : 2.5 %  Auto Basophil % : 0.2 %    05-27    138  |  105  |  18  ----------------------------<  111<H>  3.3<L>   |  25  |  0.87    Ca    8.3<L>      27 May 2018 07:48  Mg     1.7     05-27    TPro  5.9<L>  /  Alb  2.6<L>  /  TBili  0.5  /  DBili  x   /  AST  20  /  ALT  20  /  AlkPhos  117  05-27    PT/INR - ( 27 May 2018 01:30 )   PT: 13.8 sec;   INR: 1.27 ratio         PTT - ( 27 May 2018 01:30 )  PTT:31.9 sec        RADIOLOGY & ADDITIONAL STUDIES:  < from: CT Abdomen and Pelvis w/ Oral Cont (05.27.18 @ 12:49) >    EXAM:  CT ABDOMEN AND PELVIS OC                            PROCEDURE DATE:  05/27/2018          INTERPRETATION:  Clinical information: Fall. Nausea/vomiting. History   gastric cancer    COMPARISON: September 13, 2017    PROCEDURE:   CT of the Abdomen and Pelvis was performed without intravenous contrast.   Intravenous contrast: None.  Oral contrast: positive contrast was administered.  Sagittal and coronal reformats were performed.    FINDINGS:    LOWER CHEST: Cardiomegaly and coronary artery calcifications. Small   bilateral pleural effusions with pleural catheter in place.    LIVER: Within normal limits.  SPLEEN: Within normal limits.  PANCREAS: Within normal limits.  GALLBLADDER: Cholecystectomy.  BILE DUCTS: Normal caliber.  ADRENALS: Within normal limits.  KIDNEYS/URETERS: No mass or hydronephrosis. Punctate calcification in the   right renal cortex.    RETROPERITONEUM: No lymphadenopathy.    VESSELS:  Atherosclerotic arterial calcifications.  Normal caliber aorta.    BOWEL: Markedirregular wall thickening in the gastric antrum compatible   with the patient's stated history of gastric, causing marked luminal   narrowing. Moderate gastric distention.. Appendix normal.  PERITONEUM: Moderate ascites. No discrete peritoneal noduleidentified.    REPRODUCTIVE ORGANS: Hysterectomy. No adnexal mass.  BLADDER: Within normal limits.    ABDOMINAL WALL: Within normal limits.  BONES: Unchanged large sclerotic lesion in the left ilium. Multiple new   sclerotic lesions in the spine.    IMPRESSION:     Gastric antral mass compatible with stated history of gastric cancer. The   mass causes marked luminal narrowing and there is moderate gastric   dilatation. Findings raise suspicion for gastric outlet obstruction.    Moderate ascites.    Worsening bone metastases with multiple new spinal lesions noted.   Unchanged large left ileal lesion.          < end of copied text >
Patient is a 67y old  Female who presents with a chief complaint of fall at approx 11pm.      HPI:  Tripped over dog and landed on face.  Denies LOC.  Had copious bleeding from nose which is now resolved.  She was on coumadin up untill about 3 weeks ago for an incidental blood clot noted on a thyroid ultrasound.  She had a hematologic complication from the coumadin that caused her INR to rise to 8 and her H & H to fall significantly requiring transfusions.     pt c/o Mild headache 3/10 on the pain scale  - Nausea /- Vomiting   denies weakness   denies numbness/ tingling   denies visual changes   denies C/T/LS  Spine pain    PAST MEDICAL & SURGICAL HISTORY:  Gastric carcinoma  CKD (chronic kidney disease) stage 3, GFR 30-59 ml/min  Diabetes  Hyperthyroidism  Diastolic CHF, chronic  Pleural effusion  Breast CA  History of thoracentesis: 3/2017  C2 cervical fracture: s/p Fusion  History of lymph node dissection of both axillae  S/P cholecystectomy  H/O: hysterectomy      SOCIAL HISTORY:  - EtOH,  - tobacco, - drugs    FAMILY HISTORY:  No pertinent family history in first degree relatives          ROS:  CONSTITUTIONAL: No fever, weight loss, or fatigue  EYES: No eye pain, visual disturbances, or discharge  ENMT:  No difficulty hearing, tinnitus, vertigo; No sinus or throat pain  NECK: No pain or stiffness  BREASTS: No pain, masses, or nipple discharge  RESPIRATORY: No cough, wheezing, chills or hemoptysis; No shortness of breath  CARDIOVASCULAR: No chest pain, palpitations, dizziness, or leg swelling  GASTROINTESTINAL: No abdominal or epigastric pain. No nausea, vomiting, or hematemesis; No diarrhea or constipation. No melena or hematochezia.  GENITOURINARY: No dysuria, frequency, hematuria, or incontinence  NEUROLOGICAL: No headaches, memory loss, loss of strength, numbness, or tremors  SKIN: No itching, burning, rashes, or lesions   LYMPH NODES: No enlarged glands  ENDOCRINE: No heat or cold intolerance; No hair loss  MUSCULOSKELETAL: No joint pain or swelling; No muscle, back, or extremity pain  PSYCHIATRIC: No depression, anxiety, mood swings, or difficulty sleeping  HEME/LYMPH: No easy bruising, or bleeding gums  ALLERY AND IMMUNOLOGIC: No hives or eczema      MEDICATIONS  (STANDING):    MEDICATIONS  (PRN):      Allergies    latex (Unknown)  No Known Drug Allergies    Intolerances        Vital Signs Last 24 Hrs  T(C): --  T(F): --  HR: --  BP: --  BP(mean): --  RR: --  SpO2: --    PHYSICAL EXAM:    AVSS  GENERAL: NAD, well-groomed, well-developed  HEAD:  Atraumatic, Normocephalic  EYES: EOMI, PERRLA, conjunctiva and sclera clear  ENMT: No tonsillar erythema, exudates, or enlargement; Moist mucous membranes, Good dentition, No lesions  NECK: Supple, No JVD, Normal thyroid  NERVOUS SYSTEM:  Alert & Oriented X3, Good concentration; Motor Strength 5/5 B/L upper and lower extremities; DTRs 2+ intact and symmetric  CHEST/LUNG: Clear to percussion bilaterally; No rales, rhonchi, wheezing, or rubs  HEART: Regular rate and rhythm; No murmurs, rubs, or gallops  ABDOMEN: Soft, Nontender, Nondistended; Bowel sounds present  EXTREMITIES:  2+ Peripheral Pulses, No clubbing, cyanosis, or edema  LYMPH: No lymphadenopathy noted  SKIN: No rashes or lesions                            8.6    6.54  )-----------( 175      ( 27 May 2018 01:30 )             27.4     05-27    137  |  104  |  18  ----------------------------<  158<H>  3.8   |  27  |  0.95    Ca    8.8      27 May 2018 02:30    TPro  5.9<L>  /  Alb  2.6<L>  /  TBili  0.5  /  DBili  x   /  AST  20  /  ALT  20  /  AlkPhos  117  05-27    PT/INR - ( 27 May 2018 01:30 )   PT: 13.8 sec;   INR: 1.27 ratio         PTT - ( 27 May 2018 01:30 )  PTT:31.9 sec    LIVER FUNCTIONS - ( 27 May 2018 02:30 )  Alb: 2.6 g/dL / Pro: 5.9 gm/dL / ALK PHOS: 117 U/L / ALT: 20 U/L / AST: 20 U/L / GGT: x                   RADIOLOGY & ADDITIONAL STUDIES:    EXAM:  CT BRAIN                            PROCEDURE DATE:  05/27/2018          INTERPRETATION:  CLINICAL INFORMATION: Trauma.  Status post fall.    TECHNIQUE:  Axial CT images were acquired through the head.  Intravenous contrast: None  Two-dimensional reformats were generated.    COMPARISON STUDY: MRI from 11/20/2010.    FINDINGS:   There are small acute right parafalcine subdural hematoma measuring up to   6 mm in caliber that slightly extends along the right cerebellar   tentorium..  There is no evidence of acute intraparenchymal or   subarachnoid hemorrhage.  There is no mass effect, midline shift or acute   territorial infarct.     To there is moderate generalized cerebral and cerebellar volume loss with   mild ventriculomegaly.    The basal cisterns are patent.      The visualized paranasal sinuses are clear.    The mastoid air cells and middle ear cavities are clear.    Patient is status post left parietal craniotomy and cranioplasty.  There   is no calvarial or skull base fracture.  Occipital cervical fusion   hardware is partially visualized.    CRITICAL VALUE: I discussed the major findings in this report with Dr. Hancock   on 05/27/2018 at 2:15 AM.  Critical value policy of the hospital was   followed. Read back and confirmation of receipt of this communication was   performed. This verbal communication supplements the text report of this   document.    IMPRESSION:   Small acute right parafalcine subdural hematoma measuring up to 6 mm in   caliber that slightly extends along the right cerebellar tentorium.      < from: CT Cervical Spine No Cont (05.27.18 @ 01:57) >  EXAM:  CT CERVICAL SPINE                            PROCEDURE DATE:  05/27/2018          INTERPRETATION:  CLINICAL INFORMATION: Trauma.  Status post fall.    History of gastric cancer.     TECHNIQUE:  Axial CT images were acquired through the cervical spine.  Intravenous contrast: None.  Two-dimensional sagittal and coronal reformats were generated.     COMPARISON STUDY: MRI from 11/19/2010.    .  FINDINGS:   The patient is status post C2 and C3 laminectomies and posterior   occipital cervical spinal fusion extending from the occipital calvarium   to the C5 level.  Spinal hardware appears intact.  There is partial   fusion of the C1 and C2 facet joint bilaterally.  There is bony ankylosis   of the left C2-C3 facet joint and bilateral C3-C4 and C4-C5 facet joints.    There is no CT evidence of cervical spine fracture or traumatic   malalignment.    There is patchy sclerosis in the body of C2 extending the left pedicle,   subtle sclerosis in the C5 vertebral body and mixed sclerosis and lucency   in the T1 vertebral body suspicious for osseous metastatic disease.    The paraspinous soft tissues are unremarkable within limits of CT scan.    Degenerative changes:  There are small disc osteophyte complexes at C5-C6, C6-C7 and C7-T1.    There is mild spinal canal stenosis at C5-C6.  There is mild multilevel   neural foraminal narrowing.    Incidental findings:  A left chest wall medication port is partially visualized.  There are indeterminant heterogeneous thyroid lesions bilaterally  measuring up to 2 cm in the right thyroid lobe and 1.7 cm in the left   thyroid lobe.    IMPRESSION:   1.  No CT evidence of cervical spine fracture or traumatic malalignment.  2.  Patchy sclerosis in the body of C2 extending into the left C2   pedicle, sclerosis in the C5 vertebral body and mixed sclerosis and   lucency in the T1 vertebral body suspicious for osseous metastatic   disease.  3.  Indeterminate heterogeneous thyroid lesions bilaterally measuring up   to 2 cm in the right and 1.7 cm in the left.  Thyroid ultrasound can be   performed as clinically warranted.    < end of copied text >            UVALDO BLACK   This document has been electronically signed. May 27 2018  2:13AM      IMP:  Neurologically intact Nonsurgical Small acute right parafalcine subdural hematoma post fall to face    PLAN: No emergent NS intervention indicated  Will monitor in stepdown bed with q2 neuro checks  Repeat CT later today (6-8 hours)       DISCUSSED WITH MD Luis Miguel

## 2018-05-27 NOTE — DISCHARGE NOTE ADULT - CARE PLAN
Principal Discharge DX:	Subdural hematoma  Goal:	follow with DR bolanos  Assessment and plan of treatment:	Follow with DR Bolanos in 1 week, call to make appt  Secondary Diagnosis:	Gastric carcinoma  Assessment and plan of treatment:	Follow with medical oncologist, worsening bone metastasis on CT scan

## 2018-05-27 NOTE — DISCHARGE NOTE ADULT - PATIENT PORTAL LINK FT
You can access the [facility] Patient Portal, offered by [facility], by registering with the following website: [email or web address]

## 2018-05-28 VITALS — TEMPERATURE: 98 F

## 2018-05-28 NOTE — PROGRESS NOTE ADULT - ASSESSMENT
67y old  Female with PMH breast ca, DM, CHF, gastric carcinoma, VTE now off coumadin who presents with a chief complaint of epistaxis s/p trip and fall x 1 hr.  pt states that she tripped over the family dog hitting face on floor.  pt denies any LOC, HA, neck pain, cp, sob, n/v/d/abd pain.   states had epistaxis after incident.  also c/o right knee pain.   pt states was taken off coumadin 3 wks ago.      Chronic Diastolic Heart Failure  -echo with preserved EF  -no signs of exacerbation    Microcytic Anemia  -baseline Hb 9-10, 7.8 today    *Mild hypokalemia  -check mag  -replete    *Severe protein calorie malnutrition  -albumin 2.6    *SDH  -per neurosurgery

## 2018-05-28 NOTE — PROGRESS NOTE ADULT - SUBJECTIVE AND OBJECTIVE BOX
CC: S/P fall, SDH (27 May 2018 16:50)    HPI:67y old  Female with PMH breast ca, DM, CHF, gastric carcinoma, VTE now off coumadin who presents with a chief complaint of epistaxis s/p trip and fall x 1 hr.  pt states that she tripped over the family dog hitting face on floor.  pt denies any LOC, HA, neck pain, cp, sob, n/v/d/abd pain.   states had epistaxis after incident.  also c/o right knee pain.   pt states was taken off coumadin 3 wks ago.        PAST MEDICAL & SURGICAL HISTORY:  Gastric carcinoma  CKD (chronic kidney disease) stage 3, GFR 30-59 ml/min  Diabetes  Hyperthyroidism  Diastolic CHF, chronic  Pleural effusion  Breast CA  History of thoracentesis: 3/2017  C2 cervical fracture: s/p Fusion  History of lymph node dissection of both axillae  S/P cholecystectomy  H/O: hysterectomy      MEDICATIONS  (STANDING):  amLODIPine   Tablet 5 milliGRAM(s) Oral at bedtime  calcium carbonate 1250 mG + Vitamin D (OsCal 500 + D) 1 Tablet(s) Oral daily  multivitamin 1 Tablet(s) Oral daily  pantoprazole    Tablet 40 milliGRAM(s) Oral two times a day  simvastatin 10 milliGRAM(s) Oral at bedtime  sodium chloride 0.9%. 1000 milliLiter(s) (80 mL/Hr) IV Continuous <Continuous>  sucralfate 1 Gram(s) Oral once (27 May 2018 04:22)    INTERVAL HPI/OVERNIGHT EVENTS:    Vital Signs Last 24 Hrs  T(C): 36.6 (28 May 2018 09:37), Max: 36.7 (27 May 2018 14:47)  T(F): 97.8 (28 May 2018 09:37), Max: 98.1 (27 May 2018 14:47)  HR: 81 (27 May 2018 18:00) (81 - 85)  BP: 148/78 (27 May 2018 18:00) (148/78 - 173/89)  BP(mean): 95 (27 May 2018 18:00) (95 - 112)  RR: 13 (27 May 2018 18:00) (13 - 29)  SpO2: 98% (27 May 2018 18:00) (97% - 100%)  I&O's Detail    REVIEW OF SYSTEMS:    CONSTITUTIONAL: No weakness, fevers or chills  EYES/ENT: No visual changes;  No vertigo or throat pain   NECK: No pain or stiffness  RESPIRATORY: No cough, wheezing, hemoptysis; No shortness of breath  CARDIOVASCULAR: No chest pain or palpitations  GASTROINTESTINAL: No abdominal or epigastric pain. No nausea, vomiting, or hematemesis; No diarrhea or constipation. No melena or hematochezia.  GENITOURINARY: No dysuria, frequency or hematuria  NEUROLOGICAL: No numbness or weakness  SKIN: No itching, burning, rashes, or lesions   All other review of systems is negative unless indicated above.  PHYSICAL EXAM:    General: Well developed; well nourished; in no acute distress  Eyes: PERRLA, EOMI; conjunctiva and sclera clear  Head: Normocephalic; atraumatic  ENMT: No nasal discharge; airway clear  Neck: Supple; non tender; no masses  Respiratory: No wheezes, rales or rhonchi  Cardiovascular: Regular rate and rhythm. S1 and S2 Normal; No murmurs, gallops or rubs  Gastrointestinal: Soft non-tender non-distended; Normal bowel sounds  Genitourinary: No costovertebral angle tenderness  Extremities: Normal range of motion, No clubbing, cyanosis or edema  Vascular: Peripheral pulses palpable 2+ bilaterally  Neurological: Alert and oriented x4  Skin: Warm and dry. No acute rash  Lymph Nodes: No acute cervical adenopathy  Musculoskeletal: Normal gait, tone, without deformities  Psychiatric: Cooperative and appropriate                            7.8    5.55  )-----------( 170      ( 27 May 2018 07:48 )             24.9     27 May 2018 07:48    138    |  105    |  18     ----------------------------<  111    3.3     |  25     |  0.87     Ca    8.3        27 May 2018 07:48  Mg     1.7       27 May 2018 07:48    TPro  5.9    /  Alb  2.6    /  TBili  0.5    /  DBili  x      /  AST  20     /  ALT  20     /  AlkPhos  117    27 May 2018 02:30    PT/INR - ( 27 May 2018 01:30 )   PT: 13.8 sec;   INR: 1.27 ratio         PTT - ( 27 May 2018 01:30 )  PTT:31.9 sec  CAPILLARY BLOOD GLUCOSE        LIVER FUNCTIONS - ( 27 May 2018 02:30 )  Alb: 2.6 g/dL / Pro: 5.9 gm/dL / ALK PHOS: 117 U/L / ALT: 20 U/L / AST: 20 U/L / GGT: x               MEDICATIONS  (STANDING):  amLODIPine   Tablet 5 milliGRAM(s) Oral at bedtime  calcium carbonate 1250 mG + Vitamin D (OsCal 500 + D) 1 Tablet(s) Oral daily  lisinopril 10 milliGRAM(s) Oral daily  multivitamin 1 Tablet(s) Oral daily  pantoprazole    Tablet 40 milliGRAM(s) Oral two times a day  simvastatin 10 milliGRAM(s) Oral at bedtime    MEDICATIONS  (PRN):  acetaminophen   Tablet 650 milliGRAM(s) Oral every 4 hours PRN moderate pain  cloNIDine 0.2 milliGRAM(s) Oral four times a day PRN SBP over 160, DBP over 100  docusate sodium 100 milliGRAM(s) Oral two times a day PRN Constipation  ondansetron    Tablet 4 milliGRAM(s) Oral every 6 hours PRN Nausea and/or Vomiting  prochlorperazine   Tablet 10 milliGRAM(s) Oral four times a day PRN nausea  senna 1 Tablet(s) Oral two times a day PRN Constipation      RADIOLOGY & ADDITIONAL TESTS:

## 2018-05-29 NOTE — CDI QUERY NOTE - NSCDIOTHERTXTBX_GEN_ALL_CORE_HH
The patient has a history of gastric cancer documented with “likely partial gastric outlet obstruction.”  RN flow sheet states 167.9 lbs, 63 inches height. Your note states severe protein calorie malnutrition. Nutrition diagnoses cannot be coded without inclusion of BMI. Please calculate and include BMI in the medical record. Also, if possible please document additional clinical indicators for malnutrition

## 2018-10-11 NOTE — H&P ADULT - ASSESSMENT
66 y.o. female with PMH breast ca s/p lumpectomies, LN dissections on chemo with L lymphedema, C2 fusion for pathologic fx, diastolic CHF, HTN, HLD, DM2, hx R pleural effusion, hx benign thyroid nodules, hyperthyroidism presents with abdominal pain. Pt states the abdominal pain started 2 weeks ago, and every time pt eats, she vomits. Pt states decreased po intake. Denies fever, chills, CP, palpitations, SOB. The abd pain is epigastric and L and R upper abdominal pain, improves with pain med and PPI. States burning sensation. Pt has LH and dizziness with change in positions. Pt denies blood in stool, but does report "dark" stool. Pt last chemo few months ago.      #nausea/vomiting and epigastric abdominal pain due to acute gastritis  -admit to med surg  -CT noted  -cont iv PPI  -NPO except meds  -GI consult, Dr Gaona    #dizziness due to anemia, slow GI bleed  -s/p 2 units PRBC in ED  -serial H/H  -guiac + in ED  -GI consult    #breast ca s/p lumpectomies, LN dissections, chemo  -oncology consult, Dr Ramires (pt reports being on chemo and would like to establish locally)    #chronic diastolic CHF  #pleural effusion  -pt currently denies SOB. Hx thoracentesis.  -I/O, daily weight  -cont lasix  -echo (8/7/17): EF 65-70% trace AR, EA reversal, mild MR, trace to mild TR, trace IN    #IZA on CKD stage 3-4 vs progression of CKD  -Cr 1.96 today  -renal consult  -pt reports taking lisinopril for HTN. Hold ACEI for now.    #HTN, HLD  -on simvastatin  -hold ACEI  -start norvasc    #DM2  -fingerstick monitoring and ISS  -hold metformin    #hyperthyroidism  -on methamazole    #DVT ppx  -SCDs due to anemia with guaic +
3 = unable to speak (not related to language barrier)

## 2019-03-14 NOTE — ED ADULT NURSE NOTE - PAIN RATING/NUMBER SCALE (0-10): ACTIVITY
Pt rounding complete.  Pt sleeping.  Restroom and comfort needs addressed.  Pt updated on plan of care.  edson Abrams, at bedside, states pt ate her dinner already.  Will continue to monitor.   4

## 2019-04-26 NOTE — PATIENT PROFILE ADULT. - NS PRO OT REFERRAL QUES 2 YN
Pt with past laser to lower extremities here with increasing leg swelling. Has not had a recent ultrasound but the one in 2017 shows reflux in several areas. She is not wearing compression stockings at this point. Have recommended her to start wearing compression stockings and will get an ultrasound of the lower extremities and if little improvement will offer repeat laser therapy.    no

## 2019-09-19 NOTE — PATIENT PROFILE ADULT. - NS PRO CONTRA REFUSE FLU INFO
Wound Check/Suture Removal Risks/benefits discussed with patient or patient surrogate/Vaccine Information Sheet (VIS) provided-VIS date: 8/07/15

## 2019-12-16 NOTE — CONSULT NOTE ADULT - CONSULT REQUESTED BY NAME
Dr. Win
[>50% of Time Spent on Counseling for ____] : Greater than 50% of the encounter time was spent on counseling for [unfilled]
Dr. Win
[Time Spent: ___ minutes] : I have spent [unfilled] minutes of face to face time with the patient

## 2020-01-01 NOTE — ED ADULT TRIAGE NOTE - NS ED NURSE DIRECT TO ROOM YN
1930 - Bedside and Verbal shift change report given to ISHAN Ferguson RN and Tiffany Eduardo student (oncoming nurse) by DOUGLAS Borrego RN (offgoing nurse). Report included the following information SBAR, Kardex, Intake/Output, MAR and Recent Results. 2100 - Assessment completed[facility], temps stable. Changed diaper. PO fed 43 mL w/ ultra preemie nipple w/ minimal spilling. 2245 - Infant crying and rooting around. PO fed 32 mL.    2340 - Changed diaper. 0000 - PO fed 17 mL, easily fatigued. Changed diaper. 0300 - Reassessment completed, no changes noted. Changed diaper. PO fed 43 mL.    0600 - Changed diaper. Temps are still stable. PO fed 29 mL. Problem: NICU 27-29 weeks: Week of life 7 until discharge  Goal: Nutrition/Diet  Outcome: Progressing Towards Goal  Note: Infant PO fed 148 mL in 12 hours today.  Goal is 125 mL every 12 hours  Goal: Respiratory  Outcome: Progressing Towards Goal  Note: No periodic breathing noted Yes

## 2020-05-14 NOTE — PATIENT PROFILE ADULT. - FUNCTIONAL SCREEN CURRENT LEVEL: EATING, MLM
Chief Complaint   Patient presents with   • Establish Care         Subjective:     Jammie Berry is a 75 y.o. female presenting to establish care.  Would like to review her back pain.    Back pain: chronic recurring low back pain; alternates sides; worsens with activity such as housework.  Uses tylenol back and spine when needed but tries to use sparingly.  Owns a heating pad but hasn't tried this yet.  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  Reports LBP occurring more frequently.  Does exercise routinely with stationary bike at home. Denies neuropathy, denies sciatic pain.    Hypothyroidism: chronic well controlled; on levothyroxine 50mcg; has been on this dose for several years with good control.  Denies s/sx hypo/hyperthyroidism    HTN with tachycardia: Controlled; using telmisartan and atenolol.      CKD: established with Dr Mae; has lab orders and plans to follow up with office within the next couple of  Months.  Diligently hydrates and avoids nephrotoxins.     Up to date on mmg; due July; utd on colonoscopy, done in 2015 and recall 10 yrs recc'd. UTD on Dexa, osteopenic.     --Past documentation noted of abnormal pap; pt referred to gyne; no records found.  Will try to request from Delfin's office     Review of systems:      Denies chest pain, shortness of breath, sore throat, difficulty swallowing, new cough, dizziness, severe headache, altered cognition, changes in bowel or bladder habits, decreased sensation, decreased strength, numbness or tingling, intolerable depression or anxiety, rash or skin concerns, changes in vision, fatigue, myalgias, painful or swollen lymph nodes.       Current Outpatient Medications:   •  atenolol (TENORMIN) 25 MG Tab, Take 1 Tab by mouth every day., Disp: 100 Tab, Rfl: 1  •  levothyroxine (SYNTHROID) 50 MCG Tab, Take 1 Tab by mouth every day., Disp: 90 Tab, Rfl: 1  •  telmisartan (MICARDIS) 40 MG Tab, Take 1 Tab  by mouth every day., Disp: 100 Tab, Rfl: 1  •  cyanocobalamin (VITAMIN B-12) 500 MCG Tab, Take 500 mcg by mouth every day., Disp: , Rfl:   •  Probiotic Product (PROBIOTIC PO), Take  by mouth., Disp: , Rfl:   •  Cholecalciferol (VITAMIN D-3 PO), Take 1,000 Units by mouth every day., Disp: , Rfl:   •  therapeutic multivitamin-minerals (THERAGRAN-M) TABS, Take 1 Tab by mouth every day., Disp: , Rfl:     Allergies, past medical history, past surgical history, family history, social history reviewed and updated    Objective:     Vitals: /72 (BP Location: Left arm, Patient Position: Sitting, BP Cuff Size: Large adult)   Pulse 78   Temp 37 °C (98.6 °F) (Temporal)   Ht 1.524 m (5')   Wt 104.3 kg (230 lb)   SpO2 94%   BMI 44.92 kg/m²   General: Alert, cooperative, dressed appropriately for weather / situation  Eyes:Normocephalic.  EOMI, no icterus or pallor.  Conjunctivae clear without erythema / irritation.  ENT:  External ears developed;    Lymph: Neck supple, absent of cervical or supraclavicular lymphadenopathy.  Thyroid palpated, free of masses or goiter.   Heart: Regular rate and rhythm.  S1 and S2 normal.  No murmurs auscultated; no murmurs / bruits heard over bilateral carotids.  Bilateral radial pulses strong and equal.  Bilateral posterior tibial pulses strong and equal.  Respiratory: Normal respiratory effort.  Clear to auscultation bilaterally.  AP ratio 1:2   Abdomen: Rounded  Skin: Visible skin intact, dry, without rash.  Musculoskeletal: Gait is normal.  Bilateral  strength strong equal.  Moves extremities freely and equally bilaterally  Neuro:  AAOx3 Visual tracking intact, no nystagmus;   Psych:  Affect/mood is normal, judgement is good, memory is intact, grooming is appropriate.    Assessment/Plan:     Jammie was seen today for establish care.    Diagnoses and all orders for this visit:    Chronic bilateral low back pain without sciatica: Agree that etiology likely associated with heavy  lifting and manual work as CNA.  Discussed core strengthening, use of heat, and routine stretching. Handout with further information and stretches provided.     CKD (chronic kidney disease), stage III (HCC): obtain labs as ordered by nephro; f/u routinely    Acquired hypothyroidism:  Stable; will recheck TSH with routine labs in early fall    Essential hypertension:  Continue current regimen and home checking;    Need for vaccination  -     Shingrix Vaccine      Will f/u in early Fall for flu shot, second Shingrix, and routine labs.     Return in about 5 months (around 10/14/2020).    Patient verbalized understanding and agreed to plan of care.  Encouraged to contact me with needs via DocLogixt or by phone if needed.      I have placed the above orders and discussed them with an approved delegating provider.  The MA is performing the below orders under the direction of Dr Hurt.    Please note that this dictation was created using voice recognition software. I have made every reasonable attempt to correct obvious errors, but I expect that there are errors of grammar and possibly content that I did not discover before finalizing the note.   (0) independent

## 2021-09-29 NOTE — H&P ADULT - [facility] MLT D E [facility]
Referred by: Saige Rodas MD; Medical Diagnosis (from order):    Diagnosis Information      Diagnosis    V43.64 (ICD-9-CM) - Z96.642 (ICD-10-CM) - Status post left hip replacement                Daily Treatment Note -  Physical Therapy    Visit:  6   Date of onset: 9/10/2021  Date of surgery: 9/10/2021  Diagnosis Precautions: L THR (anterior approach)  Not allowed to cross his legs at the moment.  Patient alert and oriented X3.  Chart reviewed at time of initial evaluation (relevant co-morbidities, allergies, tests and medications listed): Past Medical History:  No date: Anxiety  No date: Arthritis  No date: BPH  No date: Depressive disorder  No date: HLD  No date: HTN  No date: Peptic ulcer  No date: Primary malignant neuroendocrine tumor of small intestine   (CMS/HCC)  Past Surgical History:  No date: Colonoscopy  No date: Cystoscopy  No date: Cystoscopy w/ laser lithotripsy  No date: Hernia repair      Comment:  inguinal   No date: Mini-laparotomy  ALLERGIES:   -- Gold -- RASH  Diagnostic Tests: X-ray: reviewed.      SUBJECTIVE                                                                                                               9/29/21   Patient states he saw Dr Blandon 2 days ago.  Happy with progress. Patient states he drove to PT this morning and did OK.    At [facility] on 9/13/21 patient c/o pain and stiffness in both hips for several years which was getting worse so underwent L THR 9/10/21. Stayed in hospital 1 day then discharged home. Was able to do stairs before going home.    Occ   Retired .  Sports/hobbies   Fishing and hunting, working out 3-4x week.  Functional Change: Using walker to ambulate.   Step-to pattern on stairs.    OBJECTIVE                                                                                                                     Observed Gait:     At [facility] on 9/13/21 patient ambulates with RW WBAT.  Step-to pattern on stairs.    Range of Motion (ROM)   (degrees  unless noted; active unless noted; norms in ( ); negative=lacking to 0, positive=beyond 0)   Details / Comments: At eval on 9/13/21 AA ROM L hip flex 65, abd 25 degrees.  Active L knee extension (supine) lacks 15 degrees.  Strength not formally tested due post-op pain (L THR 9/10/21)  Thigh circumference L 48, R 43 cm.  Patient unable to perform active heel slides in supine due to pain/stiffness L hip.         TREATMENT                                                                                                                  Therapeutic Exercise:  Nu step seat 12, L4, 10 minutes.  Legs only.  Gentle passive stretches L hip flexion and abduction.  Gentle passive stretches L knee extension (supine)  Supine Ankle Pumps - 6 x daily - 7 x weekly - 1 sets - 20 reps  Supine Quad Set - 3 x daily - 7 x weekly - 1 sets - 20 reps  Supine Gluteal Sets - 3 x daily - 7 x weekly - 1 sets - 20 reps  Side stepping at counter  Standing L hip abd and ext with B UE support.  Standing hip flex and hams curls x 10 each  Anterior step ups x 10    Neuromuscular Re-Education:  Large balance board taps and balance.  Anterior lunge on Bosu. X 15 each. (not performed)    Skilled input: verbal instruction/cues    Writer verbally educated and received verbal consent for hand placement, positioning of patient, and techniques to be performed today from patient for hand placement and palpation for techniques as described above and how they are pertinent to the patient's plan of care.    Home Exercise Program/Education Materials: Access Code: [email or web address]  URL: [email or web address]  Date: 09/13/2021  Prepared by: Mynor Young    Exercises  Supine Ankle Pumps - 6 x daily - 7 x weekly - 1 sets - 20 reps  Supine Quad Set - 3 x daily - 7 x weekly - 1 sets - 20 reps  Supine Gluteal Sets - 3 x daily - 7 x weekly - 1 sets - 20 reps  Standing Weight Shift Side to Side - 3 x daily - 7 x weekly - 1 sets - 20 reps       ASSESSMENT                                                                                                              9/29/21   Patient still having trouble moving his L leg when  supine but slowly getting better.    At eval on 9/13/21 signs and symptoms consistent with s/p L THR (anterior approach) 9/10/21.        PLAN                                                                                                                           Suggestions for next session as indicated: Progress per plan of care, gentle passive ROM, strengthening, gait, balance.      GOALS                                                                                                                           Long Term Goals: to be met by end of plan of care  1. Decrease L hip pain to minimal to allow patient to ambulate FWB no device.  2. Improve L LE strength to at least 4/5 to allow patient to ascend stairs reciprocally safely and independently.  3. Improve active ROM L hip flexion to at least 100 degrees to allow patient to don and doff shoes and socks independently and without compensation.  4. Independent with HEP.      Therapy procedure time and total treatment time can be found documented on the Time Entry flowsheet   regular rate and rhythm

## 2023-03-21 NOTE — ED ADULT NURSE NOTE - NS ED NURSE REPORT GIVEN DT
05-Aug-2017 20:54 Odomzo Counseling- I discussed with the patient the risks of Odomzo including but not limited to nausea, vomiting, diarrhea, constipation, weight loss, changes in the sense of taste, decreased appetite, muscle spasms, and hair loss.  The patient verbalized understanding of the proper use and possible adverse effects of Odomzo.  All of the patient's questions and concerns were addressed.

## 2023-05-16 NOTE — DISCHARGE NOTE ADULT - MEDICATION SUMMARY - MEDICATIONS TO STOP TAKING
I will STOP taking the medications listed below when I get home from the hospital:    Lasix 40 mg oral tablet  -- 1 tab(s) by mouth once a day  -- Avoid prolonged or excessive exposure to direct and/or artificial sunlight while taking this medication.  It is very important that you take or use this exactly as directed.  Do not skip doses or discontinue unless directed by your doctor.  It may be advisable to drink a full glass orange juice or eat a banana daily while taking this medication.
[de-identified] : R sided back pain for several weeks.  Limiting Mvt.  No trauma.  Sitting makes it worse.  Lost a few lbs.  Appetite decreased.  No nausea, vomiting, diarrhea, and constipation. No chest pain or shortness of breath.\par \par [place] upcoming in July.\par

## 2023-06-13 ENCOUNTER — NON-APPOINTMENT (OUTPATIENT)
Age: 73
End: 2023-06-13

## 2023-06-13 DIAGNOSIS — I50.9 HEART FAILURE, UNSPECIFIED: ICD-10-CM

## 2023-06-13 DIAGNOSIS — E78.5 HYPERLIPIDEMIA, UNSPECIFIED: ICD-10-CM

## 2023-06-13 DIAGNOSIS — E05.90 THYROTOXICOSIS, UNSPECIFIED W/OUT THYROTOXIC CRISIS OR STORM: ICD-10-CM

## 2023-06-13 DIAGNOSIS — E11.9 TYPE 2 DIABETES MELLITUS W/OUT COMPLICATIONS: ICD-10-CM

## 2023-06-13 DIAGNOSIS — Z78.9 OTHER SPECIFIED HEALTH STATUS: ICD-10-CM

## 2023-06-13 DIAGNOSIS — N17.9 ACUTE KIDNEY FAILURE, UNSPECIFIED: ICD-10-CM

## 2023-06-13 DIAGNOSIS — Z86.39 PERSONAL HISTORY OF OTHER ENDOCRINE, NUTRITIONAL AND METABOLIC DISEASE: ICD-10-CM

## 2023-06-13 DIAGNOSIS — I10 ESSENTIAL (PRIMARY) HYPERTENSION: ICD-10-CM

## 2023-06-13 DIAGNOSIS — N18.30 CHRONIC KIDNEY DISEASE, STAGE 3 UNSPECIFIED: ICD-10-CM

## 2023-06-13 DIAGNOSIS — I89.0 LYMPHEDEMA, NOT ELSEWHERE CLASSIFIED: ICD-10-CM

## 2023-06-13 DIAGNOSIS — R60.0 LOCALIZED EDEMA: ICD-10-CM

## 2023-06-13 DIAGNOSIS — J90 PLEURAL EFFUSION, NOT ELSEWHERE CLASSIFIED: ICD-10-CM

## 2023-06-13 RX ORDER — ONDANSETRON 4 MG/1
4 TABLET ORAL EVERY 6 HOURS
Refills: 0 | Status: ACTIVE | COMMUNITY

## 2023-06-13 RX ORDER — ENOXAPARIN SODIUM 60 MG/.6ML
60 INJECTION SUBCUTANEOUS
Refills: 0 | Status: ACTIVE | COMMUNITY

## 2023-06-13 RX ORDER — METHIMAZOLE 10 MG/1
10 TABLET ORAL DAILY
Refills: 0 | Status: ACTIVE | COMMUNITY

## 2023-06-13 RX ORDER — OXYCODONE 5 MG/1
5 TABLET ORAL
Refills: 0 | Status: ACTIVE | COMMUNITY

## 2023-06-13 RX ORDER — LISINOPRIL 5 MG/1
5 TABLET ORAL DAILY
Refills: 0 | Status: ACTIVE | COMMUNITY

## 2023-06-13 RX ORDER — VIT B COMP NO.3/FOLIC/C/BIOTIN 1 MG-60 MG
1 TABLET ORAL DAILY
Refills: 0 | Status: ACTIVE | COMMUNITY

## 2023-06-13 RX ORDER — CALCIUM CARBONATE/VITAMIN D3 600 MG-10
600-400 TABLET ORAL TWICE DAILY
Refills: 0 | Status: ACTIVE | COMMUNITY

## 2023-06-13 RX ORDER — AMLODIPINE BESYLATE 10 MG/1
10 TABLET ORAL DAILY
Refills: 0 | Status: ACTIVE | COMMUNITY

## 2023-06-13 RX ORDER — SIMVASTATIN 10 MG/1
10 TABLET, FILM COATED ORAL DAILY
Refills: 0 | Status: ACTIVE | COMMUNITY

## 2023-06-13 RX ORDER — PANTOPRAZOLE 40 MG/1
40 TABLET, DELAYED RELEASE ORAL DAILY
Refills: 0 | Status: ACTIVE | COMMUNITY

## 2023-06-13 RX ORDER — DOCUSATE SODIUM 100 MG/1
100 CAPSULE, LIQUID FILLED ORAL TWICE DAILY
Refills: 0 | Status: ACTIVE | COMMUNITY

## 2023-06-16 NOTE — PHYSICAL THERAPY INITIAL EVALUATION ADULT - DISCHARGE DISPOSITION, PT EVAL
Regimen: Alba Lewis is supervising provider today.    Nursing Assessment: A focused nursing assessment  was performed and the patient reports the following:   Fever: NO  Chills: NO  Other signs of infection: NO  Cough: NO  Shortness of Breath: NO  Pain: NO    Pre-Treatment: - Treatment consent signed  - Patient has valid pre-authorization  - VS completed  - Treatment parameters verified in patient protocol  - Patient is identified by first & last name, Date of birth that has been verified with the patient chairside.    Treatment: Refer to [facility] and MAR for line assessment and medication administration, Blood return confirmed before, during and after treatment administered and Infusion pump used for non-vesicant drugs    Post Treatment: Treatment tolerated well; no adverse reaction    Education: No new instructions needed    Next appointment scheduled:   Future Appointments   Date Time Provider Department Center   6/19/2023  3:15 PM [facility] TREATMENT CHAIR [address]   8/10/2023  3:30 PM [facility] LAB [address]   8/15/2023  3:00 PM Regulo Lewis MD [address]       Patient instructed to call the office with any questions or concerns.    Patient Discharged: patient discharged to home per self, ambulatory      
home w/ home PT

## 2024-01-29 NOTE — DIETITIAN INITIAL EVALUATION ADULT. - 30 CAL TO
[FreeTextEntry1] : vertigo - associated with tinnitus and mild hearing loss - noting the tinnitus has improved slightly  - patient trialing meclizine - mild improvement with dexamethasone dose pack and fluticasone  - recommend patient try to consider referral if no continued improvement in symptoms   1830

## 2024-04-03 NOTE — DISCHARGE NOTE ADULT - FUNCTIONAL SCREEN CURRENT LEVEL: AMBULATION, MLM
(0) independent Hpi Title: Evaluation of Skin Lesions How Severe Are Your Spot(S)?: mild Have Your Spot(S) Been Treated In The Past?: has not been treated

## 2024-05-10 NOTE — PATIENT PROFILE ADULT. - AS SC BRADEN NUTRITION
Letter drafted.   Pt picking up in just a little bit.     Pt is feeling better.     
Patient saw Vera on her last visit and has been off work since then with congestion , and coughing up mucous. She also wants to know if she can take Allegra and the rx version of Mucinex she was prescribed that day. Please let her know when the letter is ready for pickup and if she can take both medications. Thanks.    [phone] (home)     
Yes she can take Allegra with the Mucinex.  Avoid any actual decongestions Allegra where not Allegra-D would be safe.  What does she need the letter to say?  I can draft and she can pick it up at the .  
(3) adequate

## 2025-03-05 NOTE — H&P ADULT - PROBLEM SELECTOR PLAN 1
Pt repositioned for comfort   ~admit to Medicine  ~cont. Heparin gtt per protocol  ~f/u w/ Vascular surgery in the am  ~f/u w/ anti-coagulation services

## 2025-05-02 NOTE — ED PROVIDER NOTE - CPE EDP NEURO NORM
Update History & Physical    The patient's History and Physical of April 30, 2025 was reviewed with the patient and I examined the patient. There was no change. The surgical site was confirmed by the patient and me.     Plan: The risks, benefits, expected outcome, and alternative to the recommended procedure have been discussed with the patient. Patient understands and wants to proceed with the procedure.     Electronically signed by Melanie Richardson DO on 5/2/2025 at 7:39 AM       normal...

## 2025-05-27 NOTE — ED ADULT NURSE NOTE - NS ED NOTE ABUSE SUSPICION NEGLECT YN
Prior Authorization Retail Medication Request    Medication/Dose: Nitroglycerin 0.4mg spray  Diagnosis and ICD code (if different than what is on RX):    New/renewal/insurance change PA/secondary ins. PA:  Previously Tried and Failed:    Rationale:      Insurance   Primary: medicare  Insurance ID:  [id number]    Secondary (if applicable):Medicaid  Insurance ID:  [id number]    Pharmacy Information (if different than what is on RX)  Name:  stacie mendez   Phone:    Fax:see chart    Clinic Information  Preferred routing pool for dept communication: [facility]     No